# Patient Record
Sex: FEMALE | Race: WHITE | NOT HISPANIC OR LATINO | Employment: FULL TIME | URBAN - METROPOLITAN AREA
[De-identification: names, ages, dates, MRNs, and addresses within clinical notes are randomized per-mention and may not be internally consistent; named-entity substitution may affect disease eponyms.]

---

## 2017-07-26 ENCOUNTER — TRANSFERRED RECORDS (OUTPATIENT)
Dept: HEALTH INFORMATION MANAGEMENT | Facility: CLINIC | Age: 32
End: 2017-07-26

## 2017-08-16 ENCOUNTER — OFFICE VISIT (OUTPATIENT)
Dept: FAMILY MEDICINE | Facility: CLINIC | Age: 32
End: 2017-08-16
Payer: COMMERCIAL

## 2017-08-16 VITALS
HEIGHT: 66 IN | DIASTOLIC BLOOD PRESSURE: 80 MMHG | OXYGEN SATURATION: 99 % | WEIGHT: 158 LBS | BODY MASS INDEX: 25.39 KG/M2 | TEMPERATURE: 97.2 F | HEART RATE: 84 BPM | SYSTOLIC BLOOD PRESSURE: 133 MMHG

## 2017-08-16 DIAGNOSIS — Z00.00 ROUTINE HISTORY AND PHYSICAL EXAMINATION OF ADULT: Primary | ICD-10-CM

## 2017-08-16 DIAGNOSIS — R53.83 FATIGUE, UNSPECIFIED TYPE: ICD-10-CM

## 2017-08-16 DIAGNOSIS — Z12.4 SCREENING FOR MALIGNANT NEOPLASM OF CERVIX: ICD-10-CM

## 2017-08-16 DIAGNOSIS — Z11.3 SCREEN FOR STD (SEXUALLY TRANSMITTED DISEASE): ICD-10-CM

## 2017-08-16 LAB
ALBUMIN SERPL-MCNC: 4 G/DL (ref 3.4–5)
ALP SERPL-CCNC: 58 U/L (ref 40–150)
ALT SERPL W P-5'-P-CCNC: 22 U/L (ref 0–50)
ANION GAP SERPL CALCULATED.3IONS-SCNC: 8 MMOL/L (ref 3–14)
AST SERPL W P-5'-P-CCNC: 9 U/L (ref 0–45)
BILIRUB SERPL-MCNC: 0.6 MG/DL (ref 0.2–1.3)
BUN SERPL-MCNC: 13 MG/DL (ref 7–30)
CALCIUM SERPL-MCNC: 8.9 MG/DL (ref 8.5–10.1)
CHLORIDE SERPL-SCNC: 103 MMOL/L (ref 94–109)
CO2 SERPL-SCNC: 27 MMOL/L (ref 20–32)
CREAT SERPL-MCNC: 0.83 MG/DL (ref 0.52–1.04)
ERYTHROCYTE [DISTWIDTH] IN BLOOD BY AUTOMATED COUNT: 12.6 % (ref 10–15)
GFR SERPL CREATININE-BSD FRML MDRD: 80 ML/MIN/1.7M2
GLUCOSE SERPL-MCNC: 91 MG/DL (ref 70–99)
HCT VFR BLD AUTO: 43.5 % (ref 35–47)
HGB BLD-MCNC: 14.9 G/DL (ref 11.7–15.7)
LDLC SERPL DIRECT ASSAY-MCNC: 67 MG/DL
MCH RBC QN AUTO: 32.5 PG (ref 26.5–33)
MCHC RBC AUTO-ENTMCNC: 34.3 G/DL (ref 31.5–36.5)
MCV RBC AUTO: 95 FL (ref 78–100)
PLATELET # BLD AUTO: 268 10E9/L (ref 150–450)
POTASSIUM SERPL-SCNC: 3.9 MMOL/L (ref 3.4–5.3)
PROT SERPL-MCNC: 7.4 G/DL (ref 6.8–8.8)
RBC # BLD AUTO: 4.59 10E12/L (ref 3.8–5.2)
SODIUM SERPL-SCNC: 138 MMOL/L (ref 133–144)
T PALLIDUM IGG+IGM SER QL: NEGATIVE
TSH SERPL DL<=0.005 MIU/L-ACNC: 1.11 MU/L (ref 0.4–4)
VIT B12 SERPL-MCNC: 373 PG/ML (ref 193–986)
WBC # BLD AUTO: 5.5 10E9/L (ref 4–11)

## 2017-08-16 PROCEDURE — 80053 COMPREHEN METABOLIC PANEL: CPT | Performed by: PREVENTIVE MEDICINE

## 2017-08-16 PROCEDURE — 36415 COLL VENOUS BLD VENIPUNCTURE: CPT | Performed by: PREVENTIVE MEDICINE

## 2017-08-16 PROCEDURE — G0145 SCR C/V CYTO,THINLAYER,RESCR: HCPCS | Performed by: PREVENTIVE MEDICINE

## 2017-08-16 PROCEDURE — 99385 PREV VISIT NEW AGE 18-39: CPT | Performed by: PREVENTIVE MEDICINE

## 2017-08-16 PROCEDURE — 87491 CHLMYD TRACH DNA AMP PROBE: CPT | Performed by: PREVENTIVE MEDICINE

## 2017-08-16 PROCEDURE — 83721 ASSAY OF BLOOD LIPOPROTEIN: CPT | Performed by: PREVENTIVE MEDICINE

## 2017-08-16 PROCEDURE — 87340 HEPATITIS B SURFACE AG IA: CPT | Performed by: PREVENTIVE MEDICINE

## 2017-08-16 PROCEDURE — 82607 VITAMIN B-12: CPT | Performed by: PREVENTIVE MEDICINE

## 2017-08-16 PROCEDURE — 87591 N.GONORRHOEAE DNA AMP PROB: CPT | Performed by: PREVENTIVE MEDICINE

## 2017-08-16 PROCEDURE — 87389 HIV-1 AG W/HIV-1&-2 AB AG IA: CPT | Performed by: PREVENTIVE MEDICINE

## 2017-08-16 PROCEDURE — 82306 VITAMIN D 25 HYDROXY: CPT | Performed by: PREVENTIVE MEDICINE

## 2017-08-16 PROCEDURE — 85027 COMPLETE CBC AUTOMATED: CPT | Performed by: PREVENTIVE MEDICINE

## 2017-08-16 PROCEDURE — 86803 HEPATITIS C AB TEST: CPT | Performed by: PREVENTIVE MEDICINE

## 2017-08-16 PROCEDURE — 84443 ASSAY THYROID STIM HORMONE: CPT | Performed by: PREVENTIVE MEDICINE

## 2017-08-16 PROCEDURE — 99212 OFFICE O/P EST SF 10 MIN: CPT | Mod: 25 | Performed by: PREVENTIVE MEDICINE

## 2017-08-16 PROCEDURE — 87624 HPV HI-RISK TYP POOLED RSLT: CPT | Performed by: PREVENTIVE MEDICINE

## 2017-08-16 PROCEDURE — 86780 TREPONEMA PALLIDUM: CPT | Performed by: PREVENTIVE MEDICINE

## 2017-08-16 NOTE — MR AVS SNAPSHOT
After Visit Summary   8/16/2017    Arpita Zafar    MRN: [id number]           Patient Information     Date Of Birth          1985        Visit Information        Provider Department      8/16/2017 9:00 AM Carmen Beach MD [facility]        Today's Diagnoses     Routine history and physical examination of adult    -  1    Screening for malignant neoplasm of cervix        Fatigue, unspecified type          Care Instructions      Preventive Health Recommendations  Female Ages 26 - 39  Yearly exam:   See your health care provider every year in order to    Review health changes.     Discuss preventive care.      Review your medicines if you your doctor has prescribed any.    Until age 30: Get a Pap test every three years (more often if you have had an abnormal result).    After age 30: Talk to your doctor about whether you should have a Pap test every 3 years or have a Pap test with HPV screening every 5 years.   You do not need a Pap test if your uterus was removed (hysterectomy) and you have not had cancer.  You should be tested each year for STDs (sexually transmitted diseases), if you're at risk.   Talk to your provider about how often to have your cholesterol checked.  If you are at risk for diabetes, you should have a diabetes test (fasting glucose).  Shots: Get a flu shot each year. Get a tetanus shot every 10 years.   Nutrition:     Eat at least 5 servings of fruits and vegetables each day.    Eat whole-grain bread, whole-wheat pasta and brown rice instead of white grains and rice.    Talk to your provider about Calcium and Vitamin D.     Lifestyle    Exercise at least 150 minutes a week (30 minutes a day, 5 days of the week). This will help you control your weight and prevent disease.    Limit alcohol to one drink per day.    No smoking.     Wear sunscreen to prevent skin cancer.    See your dentist every six months for an exam and cleaning.            Follow-ups  "after your visit        Who to contact     If you have questions or need follow up information about today's clinic visit or your schedule please contact [facility] directly at [phone].  Normal or non-critical lab and imaging results will be communicated to you by MyChart, letter or phone within 4 business days after the clinic has received the results. If you do not hear from us within 7 days, please contact the clinic through MyChart or phone. If you have a critical or abnormal lab result, we will notify you by phone as soon as possible.  Submit refill requests through [employer] or call your pharmacy and they will forward the refill request to us. Please allow 3 business days for your refill to be completed.          Additional Information About Your Visit        [email or web address] Information     [employer] lets you send messages to your doctor, view your test results, renew your prescriptions, schedule appointments and more. To sign up, go to [email or web address] . Click on \"Log in\" on the left side of the screen, which will take you to the Welcome page. Then click on \"Sign up Now\" on the right side of the page.     You will be asked to enter the access code listed below, as well as some personal information. Please follow the directions to create your username and password.     Your access code is: [email or web address]  Expires: 2017  9:31 AM     Your access code will  in 90 days. If you need help or a new code, please call your [place] clinic or [phone].        Care EveryWhere ID     This is your Care EveryWhere ID. This could be used by other organizations to access your [place] medical records  [id number]        Your Vitals Were     Pulse Temperature Height Last Period Pulse Oximetry Breastfeeding?    84 97.2  F (36.2  C) (Oral) 5' 6.06\" (1.678 m) 2017 (Within Days) 99% No    BMI (Body Mass Index)                   25.45 kg/m2            Blood Pressure from Last 3 Encounters: "   08/16/17 133/80    Weight from Last 3 Encounters:   08/16/17 158 lb (71.7 kg)              We Performed the Following     CBC with platelets     Comprehensive metabolic panel     HIV Antigen Antibody Combo     HPV High Risk Types DNA Cervical     LDL cholesterol direct     Pap imaged thin layer screen with HPV - recommended age 30 - 65 years (select HPV order below)     TSH with free T4 reflex     Vitamin B12     Vitamin D Deficiency        Primary Care Provider    None Specified       No primary provider on file.        Equal Access to Services     DREA PINO : Hadii kin Sen, wabrunoda cem, qajakubta kaalmada km, mark bah . So [facility] [phone].    ATENCIÓN: Si yadira nicole, tiene a sullivan disposición servicios gratuitos de asistencia lingüística. Llame al [phone].    We comply with applicable federal civil rights laws and [place] laws. We do not discriminate on the basis of race, color, national origin, age, disability sex, sexual orientation or gender identity.            Thank you!     Thank you for choosing [facility]  for your care. Our goal is always to provide you with excellent care. Hearing back from our patients is one way we can continue to improve our services. Please take a few minutes to complete the written survey that you may receive in the mail after your visit with us. Thank you!             Your Updated Medication List - Protect others around you: Learn how to safely use, store and throw away your medicines at www.disposemymeds.org.      Notice  As of 8/16/2017  9:31 AM    You have not been prescribed any medications.

## 2017-08-16 NOTE — Clinical Note
Please abstract the following data from this visit with this patient into the appropriate field in Epic:  Eye exam with ophthalmology on this date: July 26, 2017

## 2017-08-16 NOTE — NURSING NOTE
"Chief Complaint   Patient presents with     Establish Care     Physical       Initial /80 (BP Location: Left arm, Patient Position: Chair, Cuff Size: Adult Small)  Pulse 84  Temp 97.2  F (36.2  C) (Oral)  Ht 5' 6.06\" (1.678 m)  Wt 158 lb (71.7 kg)  LMP 08/02/2017 (Within Days)  SpO2 99%  Breastfeeding? No  BMI 25.45 kg/m2 Estimated body mass index is 25.45 kg/(m^2) as calculated from the following:    Height as of this encounter: 5' 6.06\" (1.678 m).    Weight as of this encounter: 158 lb (71.7 kg).  Medication Reconciliation: complete   Justina Perez CMA      "

## 2017-08-16 NOTE — PROGRESS NOTES
SUBJECTIVE:   CC: Arpita Zafar is an 32 year old woman who presents for preventive health visit.   Patient is here to establish care and for a physical.    Healthy Habits:    Do you get at least three servings of calcium containing foods daily (dairy, green leafy vegetables, etc.)? yes    Amount of exercise or daily activities, outside of work: 3 day(s) per week    Problems taking medications regularly not applicable    Medication side effects: No    Have you had an eye exam in the past two years? Yes    Do you see a dentist twice per year? No- will schedule appointment    Do you have sleep apnea, excessive snoring or daytime drowsiness?yes- daytime drowsiness     Eye exam with ophthalmology on this date: July 26, 2017    Concern: daytime drowsiness- might be due to working at night, would like to discuss what can be done.  Gets tired during the day. Symptoms for 3-4 years. No heavy periods. No melena, no rectal bleeding. No snoring. Sometimes works day and then night shifts. Symptoms even before shift work. No history of rheumatoid arthritis.     Today's PHQ-2 Score: PHQ-2 ( 1999 Pfizer) 8/16/2017   Q1: Little interest or pleasure in doing things 0   Q2: Feeling down, depressed or hopeless 0   PHQ-2 Score 0     Abuse: Current or Past(Physical, Sexual or Emotional)- No  Do you feel safe in your environment - Yes  Social History   Substance Use Topics     Smoking status: Former Smoker     Smokeless tobacco: Never Used     Alcohol use Yes     The patient does not drink >3 drinks per day nor >7 drinks per week.    Reviewed orders with patient.  Reviewed health maintenance and updated orders accordingly - Yes  BP Readings from Last 3 Encounters:   08/16/17 133/80    Wt Readings from Last 3 Encounters:   08/16/17 158 lb (71.7 kg)                  There is no problem list on file for this patient.    History reviewed. No pertinent surgical history.    Social History   Substance Use Topics     Smoking status:  "Former Smoker     Smokeless tobacco: Never Used     Alcohol use Yes     Family History   Problem Relation Age of Onset     DIABETES Father          No current outpatient prescriptions on file.     No Known Allergies        Mammogram not appropriate for this patient based on age.    Pertinent mammograms are reviewed under the imaging tab.  History of abnormal Pap smear: NO - age 30-65 PAP every 5 years with negative HPV co-testing recommended    Reviewed and updated as needed this visit by clinical staffTobacco  Allergies  Meds  Med Hx  Surg Hx  Fam Hx  Soc Hx        Reviewed and updated as needed this visit by Provider        History reviewed. No pertinent past medical history.   History reviewed. No pertinent surgical history.    ROS:  C: NEGATIVE for fever, chills, change in weight  I: NEGATIVE for worrisome rashes, moles or lesions  E: NEGATIVE for vision changes or irritation  ENT: NEGATIVE for ear, mouth and throat problems  R: NEGATIVE for significant cough or SOB  B: NEGATIVE for masses, tenderness or discharge  CV: NEGATIVE for chest pain, palpitations or peripheral edema  GI: NEGATIVE for nausea, abdominal pain, heartburn, or change in bowel habits  : NEGATIVE for unusual urinary or vaginal symptoms. Periods are regular.  M: NEGATIVE for significant arthralgias or myalgia  E: NEGATIVE for temperature intolerance, skin/hair changes  H: NEGATIVE for bleeding problems  P: NEGATIVE for changes in mood or affect    OBJECTIVE:   /80 (BP Location: Left arm, Patient Position: Chair, Cuff Size: Adult Small)  Pulse 84  Temp 97.2  F (36.2  C) (Oral)  Ht 5' 6.06\" (1.678 m)  Wt 158 lb (71.7 kg)  LMP 08/02/2017 (Within Days)  SpO2 99%  Breastfeeding? No  BMI 25.45 kg/m2  EXAM:  GENERAL: healthy, alert and no distress  EYES: Eyes grossly normal to inspection, PERRL and conjunctivae and sclerae normal  HENT: ear canals and TM's normal, nose and mouth without ulcers or lesions  NECK: no adenopathy, no " asymmetry, masses, or scars and thyroid normal to palpation  RESP: lungs clear to auscultation - no rales, rhonchi or wheezes  BREAST: normal without masses, tenderness or nipple discharge and no palpable axillary masses or adenopathy  CV: regular rate and rhythm, normal S1 S2, no S3 or S4, no murmur, click or rub, no peripheral edema and peripheral pulses strong  ABDOMEN: soft, nontender, no hepatosplenomegaly, no masses   (female): normal female external genitalia, normal urethral meatus, vaginal mucosa pink, moist, well rugated, and normal cervix/adnexa/uterus without masses or discharge  MS: no gross musculoskeletal defects noted, no edema  SKIN: no suspicious lesions or rashes  NEURO: Normal strength and tone, mentation intact and speech normal  PSYCH: mentation appears normal, affect normal/bright  LYMPH: no cervical, supraclavicular, axillary,  adenopathy    ASSESSMENT/PLAN:   Arpita was seen today for establish care and physical.    Diagnoses and all orders for this visit:    Routine history and physical examination of adult  -     LDL cholesterol direct  -     HIV Antigen Antibody Combo  -Will check non fasting labs today     Screening for malignant neoplasm of cervix  -     Pap imaged thin layer screen with HPV - recommended age 30 - 65 years (select HPV order below)  -     HPV High Risk Types DNA Cervical  -Screening guidelines reviewed     Fatigue, unspecified type  -     Vitamin D Deficiency  -     Vitamin B12  -     TSH with free T4 reflex  -     CBC with platelets  -     Comprehensive metabolic panel  -If labs are normal and daytime drowsiness persists then will need to be referred to SLEEP    Screen for STD (sexually transmitted disease)  -     Anti Treponema  -     Chlamydia trachomatis PCR  -     Neisseria gonorrhoeae PCR  -     Hepatitis C antibody  -     Hepatitis B surface antigen        COUNSELING:   Reviewed preventive health counseling, as reflected in patient instructions       Regular  "exercise       Healthy diet/nutrition       Safe sex practices/STD prevention       HIV screeninx in teen years, 1x in adult years, and at intervals if high risk    BP Screening:   Last 3 BP Readings:    BP Readings from Last 3 Encounters:   17 133/80       The following was recommended to the patient:  Re-screen BP within a year and recommended lifestyle modifications     reports that she has quit smoking. She has never used smokeless tobacco.    Estimated body mass index is 25.45 kg/(m^2) as calculated from the following:    Height as of this encounter: 5' 6.06\" (1.678 m).    Weight as of this encounter: 158 lb (71.7 kg).   Weight management plan: Discussed healthy diet and exercise guidelines and patient will follow up in 12 months in clinic to re-evaluate.    Counseling Resources:  ATP IV Guidelines  Pooled Cohorts Equation Calculator  Breast Cancer Risk Calculator  FRAX Risk Assessment  ICSI Preventive Guidelines  Dietary Guidelines for Americans, 2010  USDA's MyPlate  ASA Prophylaxis  Lung CA Screening    Carmen Beach MD MPH    [facility]  "

## 2017-08-17 LAB
C TRACH DNA SPEC QL NAA+PROBE: NEGATIVE
N GONORRHOEA DNA SPEC QL NAA+PROBE: NEGATIVE
SPECIMEN SOURCE: NORMAL
SPECIMEN SOURCE: NORMAL

## 2017-08-17 NOTE — PROGRESS NOTES
Arpita,     Tests for Gonorrhea and Chlamydia are negative. Other labs are pending.     Please do not hesitate to call us at [phone] if you have any questions or concerns.    Thank you,    Carmen Beach MD MPH

## 2017-08-17 NOTE — PROGRESS NOTES
Arpita,     Basic blood count did not show anemia or infection in the blood. Electrolytes, glucose, kidney function, liver function, thyroid function, and Vitamin B12 levels are normal.  LDL cholesterol is at goal for you.  Test for Syphilis is negative.  Other labs are pending.    Please do not hesitate to call us at [phone] if you have any questions or concerns.    Thank you,    Carmen Beach MD MPH

## 2017-08-18 LAB
COPATH REPORT: NORMAL
DEPRECATED CALCIDIOL+CALCIFEROL SERPL-MC: 27 UG/L (ref 20–75)
HBV SURFACE AG SERPL QL IA: NONREACTIVE
HCV AB SERPL QL IA: NONREACTIVE
HIV 1+2 AB+HIV1 P24 AG SERPL QL IA: NONREACTIVE
PAP: NORMAL

## 2017-08-21 PROBLEM — R87.810 CERVICAL HIGH RISK HPV (HUMAN PAPILLOMAVIRUS) TEST POSITIVE: Status: ACTIVE | Noted: 2017-08-16

## 2017-08-21 LAB
FINAL DIAGNOSIS: ABNORMAL
HPV HR 12 DNA CVX QL NAA+PROBE: POSITIVE
HPV16 DNA SPEC QL NAA+PROBE: NEGATIVE
HPV18 DNA SPEC QL NAA+PROBE: NEGATIVE
SPECIMEN DESCRIPTION: ABNORMAL

## 2017-08-22 NOTE — PROGRESS NOTES
Arpita,     Tests for HIV, Hepatitis B and C are negative.  Vitamin D levels are in an acceptable range.     Please do not hesitate to call us at [phone] if you have any questions or concerns.    Thank you,    Carmen Beach MD MPH

## 2018-01-16 ENCOUNTER — OFFICE VISIT (OUTPATIENT)
Dept: FAMILY MEDICINE | Facility: CLINIC | Age: 33
End: 2018-01-16
Payer: COMMERCIAL

## 2018-01-16 VITALS
HEART RATE: 84 BPM | OXYGEN SATURATION: 97 % | SYSTOLIC BLOOD PRESSURE: 138 MMHG | DIASTOLIC BLOOD PRESSURE: 82 MMHG | TEMPERATURE: 98.1 F | WEIGHT: 157.3 LBS | BODY MASS INDEX: 25.34 KG/M2

## 2018-01-16 DIAGNOSIS — L30.9 ECZEMA, UNSPECIFIED TYPE: Primary | ICD-10-CM

## 2018-01-16 PROCEDURE — 99213 OFFICE O/P EST LOW 20 MIN: CPT | Performed by: PHYSICIAN ASSISTANT

## 2018-01-16 RX ORDER — TRIAMCINOLONE ACETONIDE 1 MG/G
CREAM TOPICAL
Qty: 45 G | Refills: 1 | Status: SHIPPED | OUTPATIENT
Start: 2018-01-16 | End: 2018-03-23

## 2018-01-16 NOTE — PROGRESS NOTES
SUBJECTIVE:   Arpita Zafar is a 32 year old female who presents to clinic today for the following health issues:  Rash  Onset: 1-2 months ago    Description:   Location: Left leg, spread to lower abdomen, knees and elbows   Character: red  Itching (Pruritis): YES    Progression of Symptoms:  worsening    Accompanying Signs & Symptoms:  Fever: no   Body aches or joint pain: no   Sore throat symptoms: no   Recent cold symptoms: no     History:   Previous similar rash: no     Precipitating factors:   Exposure to similar rash: no   New exposures: None   Recent travel: no   Therapies Tried and outcome: hydrocortisone cream 2.5%- helped only slightly, but only uses once a week and only has tried twice          Problem list and histories reviewed & adjusted, as indicated.  Additional history: as documented    Patient Active Problem List   Diagnosis     Cervical high risk HPV (human papillomavirus) test positive     History reviewed. No pertinent surgical history.    Social History   Substance Use Topics     Smoking status: Former Smoker     Smokeless tobacco: Never Used     Alcohol use Yes     Family History   Problem Relation Age of Onset     DIABETES Father          Current Outpatient Prescriptions   Medication Sig Dispense Refill     triamcinolone (KENALOG) 0.1 % cream Apply sparingly to affected area three times daily as needed 45 g 1     No Known Allergies           ROS:  Constitutional, HEENT, cardiovascular, pulmonary, gi and gu systems are negative, except as otherwise noted.      OBJECTIVE:   /82 (BP Location: Right arm, Patient Position: Chair, Cuff Size: Adult Regular)  Pulse 84  Temp 98.1  F (36.7  C) (Oral)  Wt 157 lb 4.8 oz (71.4 kg)  LMP 01/15/2018 (Exact Date)  SpO2 97%  BMI 25.34 kg/m2  Body mass index is 25.34 kg/(m^2).  GENERAL: healthy, alert and no distress  SKIN: 2-3 erythematous, dry patches of skin on the legs, and one on abdomen excoriation marks are present    Diagnostic Test  Results:  none     ASSESSMENT/PLAN:       ICD-10-CM    1. Eczema, unspecified type L30.9 triamcinolone (KENALOG) 0.1 % cream     Triamcinolone apply three times a day for 2 weeks or less as needed  Follow up in 2 weeks if not better      Jacquie Griffith PA-C  [facility]

## 2018-01-16 NOTE — MR AVS SNAPSHOT
After Visit Summary   1/16/2018    Arpita Zafar    MRN: [id number]           Patient Information     Date Of Birth          1985        Visit Information        Provider Department      1/16/2018 11:00 AM Jacquie Griffith PA-C [facility]        Today's Diagnoses     Need for prophylactic vaccination and inoculation against influenza    -  1    Eczema, unspecified type          Care Instructions    Triamcinolone apply three times a day for 2 weeks or less as needed   Atopic Dermatitis (Adult)  Atopic dermatitis is a dry, itchy, red rash. It s also called eczema. The rash is chronic, or ongoing. It can come and go over time. The disease is often passed down in families. It causes a problem with the skin barrier that makes the skin more sensitive to the environment and other factors. The increased skin sensitivity causes an itch, which causes scratching. Scratching can worsen the itching or also break the skin. This can put the skin at risk of infection.  The condition is most common in people with asthma, hay fever, hives, or dry or sensitive skin. The rash may be caused by extreme heat or heavy sweating. Skin irritants can cause the rash to flare up. These can include wool or silk clothing, grease, oils, some medicines, and harsh soaps and detergents. Emotional stress can also be a trigger.  Treatment is done to relieve the itching and inflammation of the skin.  Home care  Follow these tips to care for your condition:    Keep the areas of rash clean by bathing at least every other day. Use lukewarm water to bathe. Don t use hot water, which can dry out the skin.    Don t use soaps with strong detergents. Use mild soaps made for sensitive skin.    Apply a cream or ointment to damp skin right after bathing.    Avoid things that irritate your skin. Wear absorbent, soft fabrics next to the skin rather than rough or scratchy materials.    Use mild laundry soap free  of scents and perfumes. Make sure to rinse all the soap out of your clothes.    Treat any skin infection as directed.    Use oral diphenhydramine to help reduce itching. This is an antihistamine you can buy at drug and grocery stores. It can make you sleepy, so use lower doses during the daytime. Or you can use loratadine. This is an antihistamine that will not make you sleepy. Do not use diphenhydramine if you have glaucoma or have trouble urinating due to an enlarged prostate.  Follow-up care  See your healthcare provider, or as advised. If your symptoms don t get better or if they get worse in the next 7 days, make an appointment with your healthcare provider.  When to seek medical advice  Call your healthcare provider right away  if any of these occur:    Increasing area of redness or pain in the skin    Yellow crusts or wet drainage from the rash    Fever of 100.4 F (38 C) or higher, or as directed by your healthcare provider  Date Last Reviewed: 9/1/2016 2000-2017 [employer]. [address] [place] [address]. All rights reserved. This information is not intended as a substitute for professional medical care. Always follow your healthcare professional's instructions.                Follow-ups after your visit        Who to contact     If you have questions or need follow up information about today's clinic visit or your schedule please contact [facility] directly at [phone].  Normal or non-critical lab and imaging results will be communicated to you by MyChart, letter or phone within 4 business days after the clinic has received the results. If you do not hear from us within 7 days, please contact the clinic through MyChart or phone. If you have a critical or abnormal lab result, we will notify you by phone as soon as possible.  Submit refill requests through [employer] or call your pharmacy and they will forward the refill request to us. Please allow 3 business  days for your refill to be completed.          Additional Information About Your Visit        MyChart Information     [employer] gives you secure access to your electronic health record. If you see a primary care provider, you can also send messages to your care team and make appointments. If you have questions, please call your primary care clinic.  If you do not have a primary care provider, please call [phone] and they will assist you.        Care EveryWhere ID     This is your Care EveryWhere ID. This could be used by other organizations to access your [place] medical records  [id number]        Your Vitals Were     Pulse Temperature Last Period Pulse Oximetry BMI (Body Mass Index)       84 98.1  F (36.7  C) (Oral) 01/15/2018 (Exact Date) 97% 25.34 kg/m2        Blood Pressure from Last 3 Encounters:   01/16/18 138/82   08/16/17 133/80    Weight from Last 3 Encounters:   01/16/18 157 lb 4.8 oz (71.4 kg)   08/16/17 158 lb (71.7 kg)              Today, you had the following     No orders found for display         Today's Medication Changes          These changes are accurate as of: 1/16/18 11:25 AM.  If you have any questions, ask your nurse or doctor.               Start taking these medicines.        Dose/Directions    triamcinolone 0.1 % cream   Commonly known as:  KENALOG   Used for:  Eczema, unspecified type   Started by:  Jacquie Griffith PA-C        Apply sparingly to affected area three times daily as needed   Quantity:  45 g   Refills:  1            Where to get your medicines      These medications were sent to [facility] [place], [place] - [address]  [address], [facility] [address]     Phone:  [phone]     triamcinolone 0.1 % cream                Primary Care Provider Fax #    Provider Not In System [phone]                Equal Access to Services     DREA PINO AH: David Sen, mirtha priest, maite barbour,  mark muñozmarii jama'aan ah. So [facility] [phone].    ATENCIÓN: Si habla corey, tiene a sullivan disposición servicios gratuitos de asistencia lingüística. Sheyla al [phone].    We comply with applicable federal civil rights laws and [place] laws. We do not discriminate on the basis of race, color, national origin, age, disability, sex, sexual orientation, or gender identity.            Thank you!     Thank you for choosing [facility]  for your care. Our goal is always to provide you with excellent care. Hearing back from our patients is one way we can continue to improve our services. Please take a few minutes to complete the written survey that you may receive in the mail after your visit with us. Thank you!             Your Updated Medication List - Protect others around you: Learn how to safely use, store and throw away your medicines at www.disposemymeds.org.          This list is accurate as of: 1/16/18 11:25 AM.  Always use your most recent med list.                   Brand Name Dispense Instructions for use Diagnosis    triamcinolone 0.1 % cream    KENALOG    45 g    Apply sparingly to affected area three times daily as needed    Eczema, unspecified type

## 2018-01-22 ENCOUNTER — OFFICE VISIT (OUTPATIENT)
Dept: FAMILY MEDICINE | Facility: CLINIC | Age: 33
End: 2018-01-22
Payer: COMMERCIAL

## 2018-01-22 VITALS
TEMPERATURE: 97.4 F | WEIGHT: 156.6 LBS | SYSTOLIC BLOOD PRESSURE: 135 MMHG | DIASTOLIC BLOOD PRESSURE: 80 MMHG | OXYGEN SATURATION: 97 % | BODY MASS INDEX: 26.09 KG/M2 | HEIGHT: 65 IN | HEART RATE: 81 BPM

## 2018-01-22 DIAGNOSIS — L02.818 CUTANEOUS ABSCESS OF OTHER SITE: Primary | ICD-10-CM

## 2018-01-22 PROCEDURE — 99213 OFFICE O/P EST LOW 20 MIN: CPT | Performed by: NURSE PRACTITIONER

## 2018-01-22 RX ORDER — CEPHALEXIN 500 MG/1
500 CAPSULE ORAL 3 TIMES DAILY
Qty: 20 CAPSULE | Refills: 0 | Status: SHIPPED | OUTPATIENT
Start: 2018-01-22 | End: 2018-03-19

## 2018-01-22 NOTE — PROGRESS NOTES
"  SUBJECTIVE:   Arpita Zafar is a 32 year old female who presents to clinic today for the following health issues:      Concern - Ingrowth Hair  Onset: 01/18/18     Description:   Ingrowth hair     Intensity: severe    Progression of Symptoms:  worsening    Accompanying Signs & Symptoms:  Painful, and infected    Previous history of similar problem:   none    Precipitating factors:   Worsened by: none    Alleviating factors:  Improved by: none    Therapies Tried and outcome: none    Was shaving pubic area and had ingrown hair which she then plucked with tweezer. Now she has large, sore, red and warm area on that site.  No fever or chills.    Problem list and histories reviewed & adjusted, as indicated.  Additional history: as documented    Patient Active Problem List   Diagnosis     Cervical high risk HPV (human papillomavirus) test positive     No past surgical history on file.    Social History   Substance Use Topics     Smoking status: Former Smoker     Smokeless tobacco: Never Used     Alcohol use Yes     Family History   Problem Relation Age of Onset     DIABETES Father              Reviewed and updated as needed this visit by clinical staff       Reviewed and updated as needed this visit by Provider         ROS:  Constitutional, HEENT, cardiovascular, pulmonary, gi and gu systems are negative, except as otherwise noted.      OBJECTIVE:   /80 (BP Location: Left arm, Patient Position: Chair, Cuff Size: Adult Regular)  Pulse 81  Temp 97.4  F (36.3  C) (Oral)  Ht 1.66 m (5' 5.35\")  Wt 71 kg (156 lb 9.6 oz)  LMP 01/15/2018 (Exact Date)  SpO2 97%  Breastfeeding? No  BMI 25.78 kg/m2  Body mass index is 25.78 kg/(m^2).  GENERAL: healthy, alert and no distress  MS: no gross musculoskeletal defects noted, no edema  SKIN: red, firm, warm, tender to palpation 2 cm in diameter located on lower pelvis, no fluctance and minimal surrounding erythema.  PSYCH: mentation appears normal, affect " normal/bright    Diagnostic Test Results:  none     ASSESSMENT/PLAN:     1. Cutaneous abscess of other site  No ready for I and D.  Appears early cellulitic.  Treatment as below.  - cephALEXin (KEFLEX) 500 MG capsule; Take 1 capsule (500 mg) by mouth 3 times daily  Dispense: 20 capsule; Refill: 0    Patient Instructions     Warm compresses at least twice a day for the next 4-5 days.  Take the Keflex three times a day with food for 10 days.  Follow up if not improving or if worsening.      At [facility], we strive to deliver an exceptional experience to you, every time we see you.  If you receive a survey in the mail, please send us back your thoughts. We really do value your feedback.    Based on your medical history, these are the current health maintenance/preventive care services that you are due for (some may have been done at this visit.)  Health Maintenance Due   Topic Date Due     INFLUENZA VACCINE (SYSTEM ASSIGNED)  09/01/2017         Suggested websites for health information:  Www.[employer].SMART : Up to date and easily searchable information on multiple topics.  Www.medlineplus.gov : medication info, interactive tutorials, watch real surgeries online  Www.familydoctor.org : good info from the Academy of Family Physicians  Www.cdc.gov : public health info, travel advisories, epidemics (H1N1)  Www.aap.org : children's health info, normal development, vaccinations  Www.health[place]state[place] : [place] dept of health, public health issues in [place], N1N1    Your care team:                            Family Medicine Internal Medicine   MD Jack Muñoz MD Shantel Branch-Fleming, MD Katya Georgiev PA-C Nam Ho, MD Pediatrics   STACY Dewey, KOLBY Kessler APRN MD Deonna Nolasco MD Deborah Mielke, MD Kim Thein, APRN CNP      Clinic hours: Monday - Thursday 7 am-7 pm; Fridays 7 am-5 pm.   Urgent care: Monday - Friday 11 am-9 pm;  Saturday and Sunday 9 am-5 pm.  Pharmacy : Monday -Thursday 8 am-8 pm; Friday 8 am-6 pm; Saturday and Sunday 9 am-5 pm.     Clinic: [phone]   Pharmacy: [phone]        SALAS Singletary [facility]

## 2018-01-22 NOTE — MR AVS SNAPSHOT
After Visit Summary   1/22/2018    Arpita Zafar    MRN: [id number]           Patient Information     Date Of Birth          1985        Visit Information        Provider Department      1/22/2018 3:00 PM Sanjuana Kendall APRN CNP [facility]        Today's Diagnoses     Cutaneous abscess of other site    -  1      Care Instructions    Warm compresses at least twice a day for the next 4-5 days.  Take the Keflex three times a day with food for 10 days.  Follow up if not improving or if worsening.      At [facility], we strive to deliver an exceptional experience to you, every time we see you.  If you receive a survey in the mail, please send us back your thoughts. We really do value your feedback.    Based on your medical history, these are the current health maintenance/preventive care services that you are due for (some may have been done at this visit.)  Health Maintenance Due   Topic Date Due     INFLUENZA VACCINE (SYSTEM ASSIGNED)  09/01/2017         Suggested websites for health information:  Www.[employer].org : Up to date and easily searchable information on multiple topics.  Www.medlineplus.gov : medication info, interactive tutorials, watch real surgeries online  Www.familydoctor.org : good info from the Academy of Family Physicians  Www.cdc.gov : public health info, travel advisories, epidemics (H1N1)  Www.aap.org : children's health info, normal development, vaccinations  Www.health.state[place] : [place] dept of health, public health issues in [place], N1N1    Your care team:                            Family Medicine Internal Medicine   MD Jack Muñoz MD Shantel Branch-Fleming, MD Katya Georgiev PA-C Nam Ho, MD Pediatrics   STACY Dewey CNP Amelia Massimini APRN CNP Shaista Malik, MD Bethany Templen, MD Deborah Mielke, MD Kim Thein, APRN CNP      Clinic hours: Monday - Thursday 7 am-7 pm;  "Fridays 7 am-5 pm.   Urgent care: Monday - Friday 11 am-9 pm; Saturday and Sunday 9 am-5 pm.  Pharmacy : Monday -Thursday 8 am-8 pm; Friday 8 am-6 pm; Saturday and Sunday 9 am-5 pm.     Clinic: [phone]   Pharmacy: [phone]            Follow-ups after your visit        Who to contact     If you have questions or need follow up information about today's clinic visit or your schedule please contact [facility] directly at [phone].  Normal or non-critical lab and imaging results will be communicated to you by MyChart, letter or phone within 4 business days after the clinic has received the results. If you do not hear from us within 7 days, please contact the clinic through [employer] or phone. If you have a critical or abnormal lab result, we will notify you by phone as soon as possible.  Submit refill requests through [employer] or call your pharmacy and they will forward the refill request to us. Please allow 3 business days for your refill to be completed.          Additional Information About Your Visit        [employer] Information     [employer] gives you secure access to your electronic health record. If you see a primary care provider, you can also send messages to your care team and make appointments. If you have questions, please call your primary care clinic.  If you do not have a primary care provider, please call [phone] and they will assist you.        Care EveryWhere ID     This is your Care EveryWhere ID. This could be used by other organizations to access your [facility] medical records  [id number]        Your Vitals Were     Pulse Temperature Height Last Period Pulse Oximetry Breastfeeding?    81 97.4  F (36.3  C) (Oral) 1.66 m (5' 5.35\") 01/15/2018 (Exact Date) 97% No    BMI (Body Mass Index)                   25.78 kg/m2            Blood Pressure from Last 3 Encounters:   01/22/18 135/80   01/16/18 138/82   08/16/17 133/80    Weight from Last 3 Encounters:   01/22/18 71 " kg (156 lb 9.6 oz)   01/16/18 71.4 kg (157 lb 4.8 oz)   08/16/17 71.7 kg (158 lb)              Today, you had the following     No orders found for display         Today's Medication Changes          These changes are accurate as of: 1/22/18  3:29 PM.  If you have any questions, ask your nurse or doctor.               Start taking these medicines.        Dose/Directions    cephALEXin 500 MG capsule   Commonly known as:  KEFLEX   Used for:  Cutaneous abscess of other site   Started by:  Sanjuana Kendall APRN CNP        Dose:  500 mg   Take 1 capsule (500 mg) by mouth 3 times daily   Quantity:  20 capsule   Refills:  0            Where to get your medicines      These medications were [place] [facility] [place] - [address]  [address], [place] [address]     Phone:  [phone]     cephALEXin 500 MG capsule                Primary Care Provider Fax #    Provider Not In System [phone]                Equal Access to Services     [facility]: Hadii aad ku hadasho Soomaali, waaxda luqadaha, qaybta kaalmada adeegyada, waxay idiin hayaan wandaeg khvijaya bah . So [facility] [phone].    ATENCIÓN: Si habla español, tiene a sullivan disposición servicios gratuitos de asistencia lingüística. Llame al [phone].    We comply with applicable federal civil rights laws and [place] laws. We do not discriminate on the basis of race, color, national origin, age, disability, sex, sexual orientation, or gender identity.            Thank you!     Thank you for choosing [facility]  for your care. Our goal is always to provide you with excellent care. Hearing back from our patients is one way we can continue to improve our services. Please take a few minutes to complete the written survey that you may receive in the mail after your visit with us. Thank you!             Your Updated Medication List - Protect others around you: Learn how to safely use,  store and throw away your medicines at www.disposemymeds.org.          This list is accurate as of: 1/22/18  3:29 PM.  Always use your most recent med list.                   Brand Name Dispense Instructions for use Diagnosis    cephALEXin 500 MG capsule    KEFLEX    20 capsule    Take 1 capsule (500 mg) by mouth 3 times daily    Cutaneous abscess of other site       triamcinolone 0.1 % cream    KENALOG    45 g    Apply sparingly to affected area three times daily as needed    Eczema, unspecified type

## 2018-01-22 NOTE — PATIENT INSTRUCTIONS
Warm compresses at least twice a day for the next 4-5 days.  Take the Keflex three times a day with food for 10 days.  Follow up if not improving or if worsening.      At [facility], we strive to deliver an exceptional experience to you, every time we see you.  If you receive a survey in the mail, please send us back your thoughts. We really do value your feedback.    Based on your medical history, these are the current health maintenance/preventive care services that you are due for (some may have been done at this visit.)  Health Maintenance Due   Topic Date Due     INFLUENZA VACCINE (SYSTEM ASSIGNED)  09/01/2017         Suggested websites for health information:  Www.[employer]ty[employer] : Up to date and easily searchable information on multiple topics.  Www.medlineplus.gov : medication info, interactive tutorials, watch real surgeries online  Www.familydoctor.org : good info from the Academy of Family Physicians  Www.cdc.gov : public health info, travel advisories, epidemics (H1N1)  Www.aap.org : children's health info, normal development, vaccinations  Www.health.[facility].[place] : [place] dept of health, public health issues in [place], N1N1    Your care team:                            Family Medicine Internal Medicine   MD Jack Muñoz MD Shantel Branch-Fleming, MD Katya Georgiev PA-C Nam Ho, MD Pediatrics   STACY Dewey, MD Deonna Araujo CNP, MD Deborah Mielke, MD Kim Thein, APRN [facility]      Clinic hours: Monday - Thursday 7 am-7 pm; Fridays 7 am-5 pm.   Urgent care: Monday - Friday 11 am-9 pm; Saturday and Sunday 9 am-5 pm.  Pharmacy : Monday -Thursday 8 am-8 pm; Friday 8 am-6 pm; Saturday and Sunday 9 am-5 pm.     Clinic: [phone]   Pharmacy: [phone]

## 2018-03-19 ENCOUNTER — OFFICE VISIT (OUTPATIENT)
Dept: FAMILY MEDICINE | Facility: CLINIC | Age: 33
End: 2018-03-19
Payer: COMMERCIAL

## 2018-03-19 VITALS
OXYGEN SATURATION: 100 % | WEIGHT: 155 LBS | BODY MASS INDEX: 25.51 KG/M2 | TEMPERATURE: 97.3 F | SYSTOLIC BLOOD PRESSURE: 138 MMHG | HEART RATE: 87 BPM | DIASTOLIC BLOOD PRESSURE: 87 MMHG

## 2018-03-19 DIAGNOSIS — L73.9 FOLLICULITIS: Primary | ICD-10-CM

## 2018-03-19 PROCEDURE — 99213 OFFICE O/P EST LOW 20 MIN: CPT | Performed by: NURSE PRACTITIONER

## 2018-03-19 ASSESSMENT — PAIN SCALES - GENERAL: PAINLEVEL: MILD PAIN (2)

## 2018-03-19 NOTE — PROGRESS NOTES
SUBJECTIVE:   Arpita Zafar is a 33 year old female who presents to clinic today for the following health issues:    Rash  Onset:x 2-3 days: pt states she think she has razor burn    Description:   Location: left axillary   Character: round, raised, red  Itching (Pruritis): no, pt states it is painful     Progression of Symptoms:  worsening    Accompanying Signs & Symptoms:    Pt denies any drainage   Fever: no   Body aches or joint pain: no   Sore throat symptoms: no   Recent cold symptoms: no     History:   Previous similar rash: YES in pelvic area last month requiring keflex due to cellulitis    Precipitating factors:   Exposure to similar rash: no   New exposures: None     Alleviating factors:  none    Therapies Tried and outcome: none  Of note, boyfriend's uncle was just diagnosed with cancer stage 4 so she is worried about that. Denies fatigue, night sweats, change in weight.  No family history of breast cancer.  No personal history of cancer.    Problem list and histories reviewed & adjusted, as indicated.  Additional history: as documented    Patient Active Problem List   Diagnosis     Cervical high risk HPV (human papillomavirus) test positive     No past surgical history on file.    Social History   Substance Use Topics     Smoking status: Former Smoker     Smokeless tobacco: Never Used     Alcohol use Yes     Family History   Problem Relation Age of Onset     DIABETES Father          Current Outpatient Prescriptions   Medication Sig Dispense Refill     triamcinolone (KENALOG) 0.1 % cream Apply sparingly to affected area three times daily as needed 45 g 1     No Known Allergies  Recent Labs   Lab Test  08/16/17   0953   LDL  67   ALT  22   CR  0.83   GFRESTIMATED  80   GFRESTBLACK  >90   POTASSIUM  3.9   TSH  1.11      BP Readings from Last 3 Encounters:   03/19/18 138/87   01/22/18 135/80   01/16/18 138/82    Wt Readings from Last 3 Encounters:   03/19/18 155 lb (70.3 kg)   01/22/18 156 lb 9.6 oz  (71 kg)   01/16/18 157 lb 4.8 oz (71.4 kg)                    Reviewed and updated as needed this visit by clinical staff  Tobacco  Allergies       Reviewed and updated as needed this visit by Provider         ROS:  Constitutional, HEENT, cardiovascular, pulmonary, gi and gu systems are negative, except as otherwise noted.    OBJECTIVE:     /87 (BP Location: Left arm, Patient Position: Sitting, Cuff Size: Adult Regular)  Pulse 87  Temp 97.3  F (36.3  C) (Oral)  Wt 155 lb (70.3 kg)  SpO2 100%  BMI 25.51 kg/m2  Body mass index is 25.51 kg/(m^2).  GENERAL: healthy, alert and no distress  NECK: no adenopathy, no asymmetry, masses, or scars and thyroid normal to palpation  RESP: lungs clear to auscultation - no rales, rhonchi or wheezes  BREAST: normal without masses, tenderness or nipple discharge and no palpable axillary masses or adenopathy  CV: regular rate and rhythm, normal S1 S2, no S3 or S4, no murmur, click or rub, no peripheral edema and peripheral pulses strong  ABDOMEN: soft, nontender, no hepatosplenomegaly, no masses and bowel sounds normal  MS: no gross musculoskeletal defects noted, no edema  SKIN: left axilla:  3 red swollen hair follicles without central head or surrounding erythema.  Mild tenderness      ASSESSMENT/PLAN:     1. Folliculitis  Left axilla, not cellulitic.  Advised warm compresses.  Reassured that it is not cancer.  Likely related to shaving routine.  Follow up if worsening or not improving.      FUTURE APPOINTMENTS:       - Follow-up visit in 1-2 weeks if needed.    SALAS Singletary [facility]

## 2018-03-19 NOTE — PATIENT INSTRUCTIONS
At [facility], we strive to deliver an exceptional experience to you, every time we see you.  If you receive a survey in the mail, please send us back your thoughts. We really do value your feedback.    Suggested websites for health information:  Www.[employer].org : Up to date and easily searchable information on multiple topics.  Www.medlineplus.gov : medication info, interactive tutorials, watch real surgeries online  Www.familydoctor.org : good info from the Academy of Family Physicians  Www.cdc.gov : public health info, travel advisories, epidemics (H1N1)  Www.aap.org : children's health info, normal development, vaccinations  Www.health.[facility].[place].us : [place] dept of health, public health issues in [place], N1N1    Your care team:                            Family Medicine Internal Medicine   MD Jack Muñoz MD Shantel Branch-Fleming, MD Katya Georgiev PA-C Megan Hill, APRN CNP    Perez Ho MD Pediatrics   Josue Cain, PAHARRIS Kendall, CNP Arlin Kessler APRN CNP   MD Deonna Simmons MD Deborah Mielke, MD Kim Thein, APRN CNP      Clinic hours: Monday - Thursday 7 am-7 pm; Fridays 7 am-5 pm.   Urgent care: Monday - Friday 11 am-9 pm; Saturday and Sunday 9 am-5 pm.  Pharmacy : Monday -Thursday 8 am-8 pm; Friday 8 am-6 pm; Saturday and Sunday 9 am-5 pm.     Clinic: [phone]   Pharmacy: [phone]

## 2018-03-19 NOTE — MR AVS SNAPSHOT
After Visit Summary   3/19/2018    Arpita Zafar    MRN: [id number]           Patient Information     Date Of Birth          1985        Visit Information        Provider Department      3/19/2018 10:20 AM Sanjuana Kendall APRN CNP [facility]        Today's Diagnoses     Folliculitis    -  1      Care Instructions    At [facility], we strive to deliver an exceptional experience to you, every time we see you.  If you receive a survey in the mail, please send us back your thoughts. We really do value your feedback.    Suggested websites for health information:  Www.[employer].iB[employer]2Save : Up to date and easily searchable information on multiple topics.  Www.medlineplus.gov : medication info, interactive tutorials, watch real surgeries online  Www.familydoctor.org : good info from the Academy of Family Physicians  Www.cdc.gov : public health info, travel advisories, epidemics (H1N1)  Www.aap.org : children's health info, normal development, vaccinations  Www.health.[facility].[place].us : [place] dept of health, public health issues in [place], N1N1    Your care team:                            Family Medicine Internal Medicine   MD Jack Muñoz MD Shantel Branch-Fleming, MD Katya Georgiev PA-C Megan Hill, APRN CNP Nam Ho, MD Pediatrics   STACY Dewey, MD Deonna Araujo CNP, MD Deborah Mielke, MD Kim Thein, APRN [facility]      Clinic hours: Monday - Thursday 7 am-7 pm; Fridays 7 am-5 pm.   Urgent care: Monday - Friday 11 am-9 pm; Saturday and Sunday 9 am-5 pm.  Pharmacy : Monday -Thursday 8 am-8 pm; Friday 8 am-6 pm; Saturday and Sunday 9 am-5 pm.     Clinic: [phone]   Pharmacy: [phone]                Follow-ups after your visit        Follow-up notes from your care team     Return in about 1 week (around 3/26/2018), or if symptoms worsen or fail to improve.      Who to  contact     If you have questions or need follow up information about today's clinic visit or your schedule please contact [facility] directly at [phone].  Normal or non-critical lab and imaging results will be communicated to you by MyChart, letter or phone within 4 business days after the clinic has received the results. If you do not hear from us within 7 days, please contact the clinic through [employer] or phone. If you have a critical or abnormal lab result, we will notify you by phone as soon as possible.  Submit refill requests through [employer] or call your pharmacy and they will forward the refill request to us. Please allow 3 business days for your refill to be completed.          Additional Information About Your Visit        [employer] Information     [employer] gives you secure access to your electronic health record. If you see a primary care provider, you can also send messages to your care team and make appointments. If you have questions, please call your primary care clinic.  If you do not have a primary care provider, please call [phone] and they will assist you.        Care EveryWhere ID     This is your Care EveryWhere ID. This could be used by other organizations to access your [facility] medical records  [id number]        Your Vitals Were     Pulse Temperature Pulse Oximetry BMI (Body Mass Index)          87 97.3  F (36.3  C) (Oral) 100% 25.51 kg/m2         Blood Pressure from Last 3 Encounters:   03/19/18 138/87   01/22/18 135/80   01/16/18 138/82    Weight from Last 3 Encounters:   03/19/18 155 lb (70.3 kg)   01/22/18 156 lb 9.6 oz (71 kg)   01/16/18 157 lb 4.8 oz (71.4 kg)              Today, you had the following     No orders found for display       Primary Care Provider Office Phone # Fax #    [facility] [phone]       [address]  [facility] [address]        Equal Access to Services     DREA PINO AH: David angulo  Mckinley, mirtha luaugustinaadaha, maite kaconstantin barbour, mark mora bienvenidovijaya lacaitlynperez kmy. So [facility] [phone].    ATENCIÓN: Si yadira nicole, tiene a sullivan disposición servicios gratuitos de asistencia lingüística. Sheyla al [phone].    We comply with applicable federal civil rights laws and [place] laws. We do not discriminate on the basis of race, color, national origin, age, disability, sex, sexual orientation, or gender identity.            Thank you!     Thank you for choosing [facility]  for your care. Our goal is always to provide you with excellent care. Hearing back from our patients is one way we can continue to improve our services. Please take a few minutes to complete the written survey that you may receive in the mail after your visit with us. Thank you!             Your Updated Medication List - Protect others around you: Learn how to safely use, store and throw away your medicines at www.disposemymeds.org.          This list is accurate as of 3/19/18 11:49 AM.  Always use your most recent med list.                   Brand Name Dispense Instructions for use Diagnosis    triamcinolone 0.1 % cream    KENALOG    45 g    Apply sparingly to affected area three times daily as needed    Eczema, unspecified type

## 2018-03-22 ENCOUNTER — MYC MEDICAL ADVICE (OUTPATIENT)
Dept: FAMILY MEDICINE | Facility: CLINIC | Age: 33
End: 2018-03-22

## 2018-03-23 ENCOUNTER — OFFICE VISIT (OUTPATIENT)
Dept: FAMILY MEDICINE | Facility: CLINIC | Age: 33
End: 2018-03-23
Payer: COMMERCIAL

## 2018-03-23 VITALS
TEMPERATURE: 97.7 F | WEIGHT: 155 LBS | BODY MASS INDEX: 25.83 KG/M2 | HEIGHT: 65 IN | OXYGEN SATURATION: 97 % | DIASTOLIC BLOOD PRESSURE: 82 MMHG | SYSTOLIC BLOOD PRESSURE: 124 MMHG | HEART RATE: 77 BPM

## 2018-03-23 DIAGNOSIS — L73.9 FOLLICULITIS: Primary | ICD-10-CM

## 2018-03-23 PROCEDURE — 99213 OFFICE O/P EST LOW 20 MIN: CPT | Performed by: PREVENTIVE MEDICINE

## 2018-03-23 RX ORDER — MUPIROCIN 20 MG/G
OINTMENT TOPICAL 3 TIMES DAILY
Qty: 22 G | Refills: 1 | Status: SHIPPED | OUTPATIENT
Start: 2018-03-23 | End: 2018-03-28

## 2018-03-23 RX ORDER — SULFAMETHOXAZOLE/TRIMETHOPRIM 800-160 MG
1 TABLET ORAL 2 TIMES DAILY
Qty: 14 TABLET | Refills: 0 | Status: SHIPPED | OUTPATIENT
Start: 2018-03-23 | End: 2018-04-17

## 2018-03-23 ASSESSMENT — PAIN SCALES - GENERAL: PAINLEVEL: NO PAIN (0)

## 2018-03-23 NOTE — PROGRESS NOTES
SUBJECTIVE:   Arpita Zafar is a 33 year old female who presents to clinic today for the following health issues:      Ingrown hairs      Duration: x 5 days    Description (location/character/radiation): both arm pits    Intensity:  severe    Accompanying signs and symptoms: none    History (similar episodes/previous evaluation): None    Precipitating or alleviating factors: None    Therapies tried and outcome: warm packs   No fever or chills  No changes in soaps or deodorants  No drainage  Started like a razor burn  Has been trying not to shave  No history of diabetes     Problem list and histories reviewed & adjusted, as indicated.  Additional history: as documented    Patient Active Problem List   Diagnosis     Cervical high risk HPV (human papillomavirus) test positive     History reviewed. No pertinent surgical history.    Social History   Substance Use Topics     Smoking status: Former Smoker     Smokeless tobacco: Never Used     Alcohol use Yes     Family History   Problem Relation Age of Onset     DIABETES Father          Current Outpatient Prescriptions   Medication Sig Dispense Refill     sulfamethoxazole-trimethoprim (BACTRIM DS/SEPTRA DS) 800-160 MG per tablet Take 1 tablet by mouth 2 times daily 14 tablet 0     mupirocin (BACTROBAN) 2 % ointment Apply topically 3 times daily for 5 days 22 g 1     No Known Allergies  BP Readings from Last 3 Encounters:   03/23/18 124/82   03/19/18 138/87   01/22/18 135/80    Wt Readings from Last 3 Encounters:   03/23/18 155 lb (70.3 kg)   03/19/18 155 lb (70.3 kg)   01/22/18 156 lb 9.6 oz (71 kg)                  Labs reviewed in EPIC    Reviewed and updated as needed this visit by clinical staff  Allergies  Meds       Reviewed and updated as needed this visit by Provider         ROS:  Constitutional, HEENT, cardiovascular, pulmonary, gi and gu systems are negative, except as otherwise noted.    OBJECTIVE:                                                    BP  "124/82  Pulse 77  Temp 97.7  F (36.5  C) (Oral)  Ht 5' 5\" (1.651 m)  Wt 155 lb (70.3 kg)  LMP 02/16/2018 (Approximate)  SpO2 97%  Breastfeeding? No  BMI 25.79 kg/m2  Body mass index is 25.79 kg/(m^2).  GENERAL APPEARANCE: healthy, alert and no distress  EYES: Eyes grossly normal to inspection and conjunctivae and sclerae normal  NECK: trachea midline and normal to palpation  RESP: lungs clear to auscultation - no rales, rhonchi or wheezes  CV: regular rates and rhythm, normal S1 S2, no S3 or S4 and no murmur, click or rub  ABDOMEN: soft, non-tender  MS: extremities normal- no gross deformities noted  SKIN: Left more than right Axillae: pustules noted, on the left side there is an erythematous nodule, coming to a head. No induration or fluctuance.   NEURO: Normal strength and tone, mentation intact and speech normal  PSYCH: mentation appears normal and affect normal/bright    Diagnostic test results:  Diagnostic Test Results:  No results found for this or any previous visit (from the past 24 hour(s)).     ASSESSMENT/PLAN:                                                    1. Folliculitis  -Warm compresses  -tylenol as needed  - sulfamethoxazole-trimethoprim (BACTRIM DS/SEPTRA DS) 800-160 MG per tablet; Take 1 tablet by mouth 2 times daily  Dispense: 14 tablet; Refill: 0  - mupirocin (BACTROBAN) 2 % ointment; Apply topically 3 times daily for 5 days  Dispense: 22 g; Refill: 1      Follow up with Provider - If not better in 5-7 days then may need Dermatology referral      Carmen Beach MD MPH    [facility]  "

## 2018-03-23 NOTE — MR AVS SNAPSHOT
After Visit Summary   3/23/2018    Arpita Zafar    MRN: [id number]           Patient Information     Date Of Birth          1985        Visit Information        Provider Department      3/23/2018 3:20 PM Carmen Beach MD [facility]        Today's Diagnoses     Folliculitis    -  1      Care Instructions    At [facility], we strive to deliver an exceptional experience to you, every time we see you.  If you receive a survey in the mail, please send us back your thoughts. We really do value your feedback.    Based on your medical history, these are the current health maintenance/preventive care services that you are due for (some may have been done at this visit.)  There are no preventive care reminders to display for this patient.      Suggested websites for health information:  Www.AppHero.Diagnoplex : Up to date and easily searchable information on multiple topics.  Www.Pocket Tales.gov : medication info, interactive tutorials, watch real surgeries online  Www.familydoctor.org : good info from the Academy of Family Physicians  Www.cdc.gov : public health info, travel advisories, epidemics (H1N1)  Www.aap.org : children's health info, normal development, vaccinations  Www.health.[facility].[place].us : [place] dept of health, public health issues in [place], N1N1    Your care team:                            Family Medicine Internal Medicine   MD Jack Muñoz MD Shantel Branch-Fleming, MD Katya Georgiev PA-C Megan Hill, APRN KOLBY Ho MD Pediatrics   Josue Cain, PAJonnieC  Sanjuana Kendall, KOLBY Kessler APRN CNP   MD Deonna Simmons MD Deborah Mielke, MD Kim Thein, APRN [facility]      Clinic hours: Monday - Thursday 7 am-7 pm; Fridays 7 am-5 pm.   Urgent care: Monday - Friday 11 am-9 pm; Saturday and Sunday 9 am-5 pm.  Pharmacy : Monday -Thursday 8 am-8 pm; Friday 8 am-6 pm; Saturday and Sunday 9 am-5 pm.     Clinic: [phone]   " Pharmacy: [phone]                Follow-ups after your visit        Follow-up notes from your care team     Return if symptoms worsen or fail to improve.      Who to contact     If you have questions or need follow up information about today's clinic visit or your schedule please contact [facility] directly at [phone].  Normal or non-critical lab and imaging results will be communicated to you by MyChart, letter or phone within 4 business days after the clinic has received the results. If you do not hear from us within 7 days, please contact the clinic through [employer] or phone. If you have a critical or abnormal lab result, we will notify you by phone as soon as possible.  Submit refill requests through [employer] or call your pharmacy and they will forward the refill request to us. Please allow 3 business days for your refill to be completed.          Additional Information About Your Visit        [employer] Information     [employer] gives you secure access to your electronic health record. If you see a primary care provider, you can also send messages to your care team and make appointments. If you have questions, please call your primary care clinic.  If you do not have a primary care provider, please call [phone] and they will assist you.        Care EveryWhere ID     This is your Care EveryWhere ID. This could be used by other organizations to access your [place] medical records  [id number]        Your Vitals Were     Pulse Temperature Height Last Period Pulse Oximetry Breastfeeding?    77 97.7  F (36.5  C) (Oral) 5' 5\" (1.651 m) 02/16/2018 (Approximate) 97% No    BMI (Body Mass Index)                   25.79 kg/m2            Blood Pressure from Last 3 Encounters:   03/23/18 124/82   03/19/18 138/87   01/22/18 135/80    Weight from Last 3 Encounters:   03/23/18 155 lb (70.3 kg)   03/19/18 155 lb (70.3 kg)   01/22/18 156 lb 9.6 oz (71 kg)              Today, you had the following "     No orders found for display         Today's Medication Changes          These changes are accurate as of 3/23/18  3:50 PM.  If you have any questions, ask your nurse or doctor.               Start taking these medicines.        Dose/Directions    mupirocin 2 % ointment   Commonly known as:  BACTROBAN   Used for:  Folliculitis   Started by:  Carmen Beach MD        Apply topically 3 times daily for 5 days   Quantity:  22 g   Refills:  1       sulfamethoxazole-trimethoprim 800-160 MG per tablet   Commonly known as:  BACTRIM DS/SEPTRA DS   Used for:  Folliculitis   Started by:  Carmen Beach MD        Dose:  1 tablet   Take 1 tablet by mouth 2 times daily   Quantity:  14 tablet   Refills:  0         Stop taking these medicines if you haven't already. Please contact your care team if you have questions.     triamcinolone 0.1 % cream   Commonly known as:  KENALOG   Stopped by:  Carmen Beach MD                Where to get your medicines      These medications were sent to [facility] - [place], [place] - [address]  [address], [facility] [address]     Phone:  [phone]     mupirocin 2 % ointment    sulfamethoxazole-trimethoprim 800-160 MG per tablet                Primary Care Provider Office Phone # Fax #    [facility] [phone]       [address]  [facility] [address]        Equal Access to Services     [facility]: Hadii aad ku hadasho Soomaali, waaxda luqadaha, qaybta kaalmada adeegyada, mark stewart hayshaye bah . So [facility] [phone].    ATENCIÓN: Si habla español, tiene a sullivan disposición servicios gratuitos de asistencia lingüística. Llame al [phone].    We comply with applicable federal civil rights laws and [place] laws. We do not discriminate on the basis of race, color, national origin, age, disability, sex, sexual orientation, or gender identity.            Thank you!     Thank you for choosing  [facility]  for your care. Our goal is always to provide you with excellent care. Hearing back from our patients is one way we can continue to improve our services. Please take a few minutes to complete the written survey that you may receive in the mail after your visit with us. Thank you!             Your Updated Medication List - Protect others around you: Learn how to safely use, store and throw away your medicines at www.disposemymeds.org.          This list is accurate as of 3/23/18  3:50 PM.  Always use your most recent med list.                   Brand Name Dispense Instructions for use Diagnosis    mupirocin 2 % ointment    BACTROBAN    22 g    Apply topically 3 times daily for 5 days    Folliculitis       sulfamethoxazole-trimethoprim 800-160 MG per tablet    BACTRIM DS/SEPTRA DS    14 tablet    Take 1 tablet by mouth 2 times daily    Folliculitis

## 2018-03-23 NOTE — PATIENT INSTRUCTIONS
At [facility], we strive to deliver an exceptional experience to you, every time we see you.  If you receive a survey in the mail, please send us back your thoughts. We really do value your feedback.    Based on your medical history, these are the current health maintenance/preventive care services that you are due for (some may have been done at this visit.)  There are no preventive care reminders to display for this patient.      Suggested websites for health information:  Www.Coral.org : Up to date and easily searchable information on multiple topics.  Www.medlineplus.gov : medication info, interactive tutorials, watch real surgeries online  Www.familydoctor.org : good info from the Academy of Family Physicians  Www.cdc.gov : public health info, travel advisories, epidemics (H1N1)  Www.aap.org : children's health info, normal development, vaccinations  Www.health.[facility].[place].us : [place] dept of health, public health issues in [place], N1N1    Your care team:                            Family Medicine Internal Medicine   MD Jack Muñoz MD Shantel Branch-Fleming, MD Katya Georgiev PA-C Megan Hill, APRN CNP    Perez Ho MD Pediatrics   Josue Cain, PAHARRIS Kendall, CNP Arlin Kessler APRN CNP   MD Deonna Simmons MD Deborah Mielke, MD Kim Thein, APRN CNP      Clinic hours: Monday - Thursday 7 am-7 pm; Fridays 7 am-5 pm.   Urgent care: Monday - Friday 11 am-9 pm; Saturday and Sunday 9 am-5 pm.  Pharmacy : Monday -Thursday 8 am-8 pm; Friday 8 am-6 pm; Saturday and Sunday 9 am-5 pm.     Clinic: [phone]   Pharmacy: [phone]

## 2018-04-17 ENCOUNTER — OFFICE VISIT (OUTPATIENT)
Dept: FAMILY MEDICINE | Facility: CLINIC | Age: 33
End: 2018-04-17
Payer: COMMERCIAL

## 2018-04-17 VITALS
HEART RATE: 81 BPM | TEMPERATURE: 98.3 F | OXYGEN SATURATION: 97 % | DIASTOLIC BLOOD PRESSURE: 87 MMHG | SYSTOLIC BLOOD PRESSURE: 127 MMHG

## 2018-04-17 DIAGNOSIS — N63.22 MASS OF UPPER INNER QUADRANT OF LEFT BREAST: Primary | ICD-10-CM

## 2018-04-17 DIAGNOSIS — N63.11 MASS OF UPPER OUTER QUADRANT OF RIGHT BREAST: ICD-10-CM

## 2018-04-17 PROCEDURE — 99213 OFFICE O/P EST LOW 20 MIN: CPT | Performed by: NURSE PRACTITIONER

## 2018-04-17 ASSESSMENT — PAIN SCALES - GENERAL: PAINLEVEL: NO PAIN (0)

## 2018-04-17 NOTE — PATIENT INSTRUCTIONS
Schedule imaging at [place] as you are able.    At [facility], we strive to deliver an exceptional experience to you, every time we see you.  If you receive a survey in the mail, please send us back your thoughts. We really do value your feedback.    Based on your medical history, these are the current health maintenance/preventive care services that you are due for (some may have been done at this visit.)  There are no preventive care reminders to display for this patient.      Suggested websites for health information:  Www.[employer].org : Up to date and easily searchable information on multiple topics.  Www.medlineplus.gov : medication info, interactive tutorials, watch real surgeries online  Www.familydoctor.org : good info from the Academy of Family Physicians  Www.cdc.gov : public health info, travel advisories, epidemics (H1N1)  Www.aap.org : children's health info, normal development, vaccinations  Www.health.[facility].[place].us : [place] dept of health, public health issues in [place], N1N1    Your care team:                            Family Medicine Internal Medicine   MD Jack Muñoz MD Shantel Branch-Fleming, MD Katya Georgiev PA-C Megan Hill, APRN CNP    Perez Ho MD Pediatrics   Josue Cain PAHARRIS Kendall, CNP Arlin Kessler APRN CNP   MD Deonna Simmons MD Deborah Mielke, MD Kim Thein, APRN CNP      Clinic hours: Monday - Thursday 7 am-7 pm; Fridays 7 am-5 pm.   Urgent care: Monday - Friday 11 am-9 pm; Saturday and Sunday 9 am-5 pm.  Pharmacy : Monday -Thursday 8 am-8 pm; Friday 8 am-6 pm; Saturday and Sunday 9 am-5 pm.     Clinic: [phone]   Pharmacy: [phone]

## 2018-04-17 NOTE — MR AVS SNAPSHOT
After Visit Summary   4/17/2018    Arpita Zafar    MRN: [id number]           Patient Information     Date Of Birth          1985        Visit Information        Provider Department      4/17/2018 3:00 PM Sanjuana Kendall APRN CNP [facility]        Today's Diagnoses     Mass of upper inner quadrant of left breast    -  1    Mass of upper outer quadrant of right breast          Care Instructions    Schedule imaging at [place] as you are able.    At [facility], we strive to deliver an exceptional experience to you, every time we see you.  If you receive a survey in the mail, please send us back your thoughts. We really do value your feedback.    Based on your medical history, these are the current health maintenance/preventive care services that you are due for (some may have been done at this visit.)  There are no preventive care reminders to display for this patient.      Suggested websites for health information:  Www.[facility].org : Up to date and easily searchable information on multiple topics.  Www.medlineplus.gov : medication info, interactive tutorials, watch real surgeries online  Www.familydoctor.org : good info from the Academy of Family Physicians  Www.cdc.gov : public health info, travel advisories, epidemics (H1N1)  Www.aap.org : children's health info, normal development, vaccinations  Www.health.state.[place].us : [place] dept of health, public health issues in [place], N1N1    Your care team:                            Family Medicine Internal Medicine   MD Jack Muñoz MD Shantel Branch-Fleming, MD Katya Georgiev PA-C Megan Hill, APRN CNP Nam Ho, MD Pediatrics   STACY Dewey, MD Deonna Araujo CNP, MD Deborah Mielke, MD Kim Thein, APRN CNP      Clinic hours: Monday - Thursday 7 am-7 pm; Fridays 7 am-5 pm.   Urgent care: Monday - Friday 11 am-9  pm; Saturday and Sunday 9 am-5 pm.  Pharmacy : Monday -Thursday 8 am-8 pm; Friday 8 am-6 pm; Saturday and Sunday 9 am-5 pm.     Clinic: [phone]   Pharmacy: [phone]                Follow-ups after your visit        Future tests that were ordered for you today     Open Future Orders        Priority Expected Expires Ordered    US Breast Bilateral Complete 4 Quadrants Routine  4/17/2019 4/17/2018    MA Diagnostic Digital Bilateral Routine  4/17/2019 4/17/2018            Who to contact     If you have questions or need follow up information about today's clinic visit or your schedule please contact [facility] directly at [phone].  Normal or non-critical lab and imaging results will be communicated to you by MyChart, letter or phone within 4 business days after the clinic has received the results. If you do not hear from us within 7 days, please contact the clinic through [employer] or phone. If you have a critical or abnormal lab result, we will notify you by phone as soon as possible.  Submit refill requests through [employer] or call your pharmacy and they will forward the refill request to us. Please allow 3 business days for your refill to be completed.          Additional Information About Your Visit        [employer] Information     [employer] gives you secure access to your electronic health record. If you see a primary care provider, you can also send messages to your care team and make appointments. If you have questions, please call your primary care clinic.  If you do not have a primary care provider, please call [phone] and they will assist you.        Care EveryWhere ID     This is your Care EveryWhere ID. This could be used by other organizations to access your [place] medical records  [id number]        Your Vitals Were     Pulse Temperature Last Period Pulse Oximetry          81 98.3  F (36.8  C) (Oral) 03/26/2018 (Approximate) 97%         Blood Pressure from Last 3  Encounters:   04/17/18 127/87   03/23/18 124/82   03/19/18 138/87    Weight from Last 3 Encounters:   03/23/18 70.3 kg (155 lb)   03/19/18 70.3 kg (155 lb)   01/22/18 71 kg (156 lb 9.6 oz)                 Today's Medication Changes          These changes are accurate as of 4/17/18  3:22 PM.  If you have any questions, ask your nurse or doctor.               Stop taking these medicines if you haven't already. Please contact your care team if you have questions.     sulfamethoxazole-trimethoprim 800-160 MG per tablet   Commonly known as:  BACTRIM DS/SEPTRA DS                    Primary Care Provider Office Phone # Fax #    [facility] [phone]       [address]  [facility] [address]        Equal Access to Services     DREA PINO : David Sen, waepdro priest, maite kaalmalinette barbuor, mark bah . So [facility] [phone].    ATENCIÓN: Si habla español, tiene a sullivan disposición servicios gratuitos de asistencia lingüística. Nadiyaame al [phone].    We comply with applicable federal civil rights laws and [place] laws. We do not discriminate on the basis of race, color, national origin, age, disability, sex, sexual orientation, or gender identity.            Thank you!     Thank you for choosing [facility]  for your care. Our goal is always to provide you with excellent care. Hearing back from our patients is one way we can continue to improve our services. Please take a few minutes to complete the written survey that you may receive in the mail after your visit with us. Thank you!             Your Updated Medication List - Protect others around you: Learn how to safely use, store and throw away your medicines at www.disposemymeds.org.      Notice  As of 4/17/2018  3:22 PM    You have not been prescribed any medications.

## 2018-04-17 NOTE — PROGRESS NOTES
SUBJECTIVE:   Arpita Zafar is a 33 year old female who presents to clinic today for the following health issues:    Concern: Patient notice a lump in her right breast area. Patient declined lump on the left side of the breast.   noticed this yesterday.  No family history of breast cancer, maternal GM  of colon cancer.      Problem list and histories reviewed & adjusted, as indicated.  Additional history: as documented    Patient Active Problem List   Diagnosis     Cervical high risk HPV (human papillomavirus) test positive     No past surgical history on file.    Social History   Substance Use Topics     Smoking status: Former Smoker     Smokeless tobacco: Never Used     Alcohol use Yes     Family History   Problem Relation Age of Onset     DIABETES Father          No current outpatient prescriptions on file.     No Known Allergies    Reviewed and updated as needed this visit by clinical staff  Allergies  Meds       Reviewed and updated as needed this visit by Provider  Allergies  Meds  Problems         ROS:  Constitutional, HEENT, cardiovascular, pulmonary, gi and gu systems are negative, except as otherwise noted.    OBJECTIVE:     /87 (BP Location: Left arm, Patient Position: Chair, Cuff Size: Adult Regular)  Pulse 81  Temp 98.3  F (36.8  C) (Oral)  LMP 2018 (Approximate)  SpO2 97%  There is no height or weight on file to calculate BMI.  GENERAL: healthy, alert and no distress  BREAST: Mass at 11 oclock on left breast 1cm in diameter, no associated lymphadenopathy  Mass at 10 oclock on right breast 1.5 cm x 1 cm ovoid shape, no associated lymphadenopathy  LYMPH: no cervical, supraclavicular, axillary adenopathy    Diagnostic Test Results:  none     ASSESSMENT/PLAN:     1. Mass of upper inner quadrant of left breast  Will image.  - US Breast Bilateral Complete 4 Quadrants; Future  - MA Diagnostic Digital Bilateral; Future    2. Mass of upper outer quadrant of right breast  Will  image.  - US Breast Bilateral Complete 4 Quadrants; Future  - MA Diagnostic Digital Bilateral; Future    Patient Instructions     Schedule imaging at [place] as you are able.    At [facility], we strive to deliver an exceptional experience to you, every time we see you.  If you receive a survey in the mail, please send us back your thoughts. We really do value your feedback.    Based on your medical history, these are the current health maintenance/preventive care services that you are due for (some may have been done at this visit.)  There are no preventive care reminders to display for this patient.      Suggested websites for health information:  Www.Clinc!.StudentFunder : Up to date and easily searchable information on multiple topics.  Www.medlineplus.gov : medication info, interactive tutorials, watch real surgeries online  Www.familydoctor.org : good info from the Academy of Family Physicians  Www.cdc.gov : public health info, travel advisories, epidemics (H1N1)  Www.aap.org : children's health info, normal development, vaccinations  Www.health.[facility].[place].us : [place] dept of health, public health issues in [place], N1N1    Your care team:                            Family Medicine Internal Medicine   MD Jack Muñoz MD Shantel Branch-Fleming, MD Katya Georgiev PA-C Megan Hill, APRN CNP Nam Ho, MD Pediatrics   STACY Dewey, KOLBY Kessler APRMD Deonna Floyd CNP, MD Deborah Mielke, MD Kim Thein, APRN [facility]      Clinic hours: Monday - Thursday 7 am-7 pm; Fridays 7 am-5 pm.   Urgent care: Monday - Friday 11 am-9 pm; Saturday and Sunday 9 am-5 pm.  Pharmacy : Monday -Thursday 8 am-8 pm; Friday 8 am-6 pm; Saturday and Sunday 9 am-5 pm.     Clinic: [phone]   Pharmacy: [phone]            SALAS Singletary CNP  [facility]

## 2018-04-23 ENCOUNTER — RADIANT APPOINTMENT (OUTPATIENT)
Dept: ULTRASOUND IMAGING | Facility: CLINIC | Age: 33
End: 2018-04-23
Attending: NURSE PRACTITIONER
Payer: COMMERCIAL

## 2018-04-23 ENCOUNTER — RADIANT APPOINTMENT (OUTPATIENT)
Dept: MAMMOGRAPHY | Facility: CLINIC | Age: 33
End: 2018-04-23
Attending: NURSE PRACTITIONER
Payer: COMMERCIAL

## 2018-04-23 DIAGNOSIS — N63.22 MASS OF UPPER INNER QUADRANT OF LEFT BREAST: ICD-10-CM

## 2018-04-23 DIAGNOSIS — N63.11 MASS OF UPPER OUTER QUADRANT OF RIGHT BREAST: ICD-10-CM

## 2018-04-23 PROCEDURE — 76642 ULTRASOUND BREAST LIMITED: CPT | Mod: 50 | Performed by: RADIOLOGY

## 2018-04-23 PROCEDURE — 77066 DX MAMMO INCL CAD BI: CPT | Performed by: RADIOLOGY

## 2018-04-23 NOTE — LETTER
Arpita Zafar  [address] [facility] [place] [address]      April 23, 2018    Date of Exam:       Dear Arpita:    Thank you for your recent visit.    Result: Needs Biopsy. Based on your recent breast imaging, you have a suspicious area that usually requires a biopsy, at which time a small tissue sample would be taken from your breast.      Your breast imaging will become part of your medical file here at [facility] for at least 10 years. You are responsible for informing any new health care provider or mammography facility of the date and location of this examination. A report of your breast imaging results was sent to the health care provider you indicated.    We appreciate the opportunity to participate in your health care.    Sincerely,      Interpreting Radiologist  [facility]

## 2018-04-25 NOTE — PROGRESS NOTES
Noted. Results were discussed with the patient by radiology.  Carmen Beach MD MPH  Covering for Lety Lopez

## 2018-05-01 ENCOUNTER — RADIANT APPOINTMENT (OUTPATIENT)
Dept: MAMMOGRAPHY | Facility: CLINIC | Age: 33
End: 2018-05-01
Attending: NURSE PRACTITIONER
Payer: COMMERCIAL

## 2018-05-01 ENCOUNTER — RADIANT APPOINTMENT (OUTPATIENT)
Dept: ULTRASOUND IMAGING | Facility: CLINIC | Age: 33
End: 2018-05-01
Attending: NURSE PRACTITIONER
Payer: COMMERCIAL

## 2018-05-01 DIAGNOSIS — N63.11 MASS OF UPPER OUTER QUADRANT OF RIGHT BREAST: ICD-10-CM

## 2018-05-01 DIAGNOSIS — N63.22 MASS OF UPPER INNER QUADRANT OF LEFT BREAST: ICD-10-CM

## 2018-05-01 PROCEDURE — 88305 TISSUE EXAM BY PATHOLOGIST: CPT | Performed by: NURSE PRACTITIONER

## 2018-05-01 PROCEDURE — 88342 IMHCHEM/IMCYTCHM 1ST ANTB: CPT | Mod: 59 | Performed by: NURSE PRACTITIONER

## 2018-05-01 PROCEDURE — 88341 IMHCHEM/IMCYTCHM EA ADD ANTB: CPT | Mod: 59 | Performed by: NURSE PRACTITIONER

## 2018-05-01 PROCEDURE — 19083 BX BREAST 1ST LESION US IMAG: CPT | Mod: LT

## 2018-05-01 PROCEDURE — 88360 TUMOR IMMUNOHISTOCHEM/MANUAL: CPT | Performed by: NURSE PRACTITIONER

## 2018-05-01 RX ADMIN — Medication 1 MEQ: at 02:29

## 2018-05-04 ENCOUNTER — TELEPHONE (OUTPATIENT)
Dept: GENERAL RADIOLOGY | Facility: CLINIC | Age: 33
End: 2018-05-04

## 2018-05-04 LAB — COPATH REPORT: NORMAL

## 2018-05-04 NOTE — TELEPHONE ENCOUNTER
Called pt with results of breast biopsy. No answer. Left message with contact information for pt to return call.

## 2018-05-04 NOTE — TELEPHONE ENCOUNTER
Spoke with patient regarding left breast biopsy results, which indicate a benign fibroadenoma. Notified pt that the radiologist recommendation is clinical follow-up. Pt verbalized understanding of these results and all questions answered to her satisfaction.

## 2018-09-25 ENCOUNTER — HEALTH MAINTENANCE LETTER (OUTPATIENT)
Age: 33
End: 2018-09-25

## 2018-10-17 ENCOUNTER — HEALTH MAINTENANCE LETTER (OUTPATIENT)
Age: 33
End: 2018-10-17

## 2018-10-31 ENCOUNTER — OFFICE VISIT (OUTPATIENT)
Dept: OBGYN | Facility: CLINIC | Age: 33
End: 2018-10-31
Payer: COMMERCIAL

## 2018-10-31 VITALS
WEIGHT: 161.13 LBS | SYSTOLIC BLOOD PRESSURE: 133 MMHG | BODY MASS INDEX: 25.9 KG/M2 | HEART RATE: 83 BPM | TEMPERATURE: 98.2 F | HEIGHT: 66 IN | DIASTOLIC BLOOD PRESSURE: 91 MMHG

## 2018-10-31 DIAGNOSIS — R87.810 CERVICAL HIGH RISK HPV (HUMAN PAPILLOMAVIRUS) TEST POSITIVE: Primary | ICD-10-CM

## 2018-10-31 PROCEDURE — 87624 HPV HI-RISK TYP POOLED RSLT: CPT | Performed by: OBSTETRICS & GYNECOLOGY

## 2018-10-31 PROCEDURE — 88175 CYTOPATH C/V AUTO FLUID REDO: CPT | Performed by: OBSTETRICS & GYNECOLOGY

## 2018-10-31 PROCEDURE — 99202 OFFICE O/P NEW SF 15 MIN: CPT | Performed by: OBSTETRICS & GYNECOLOGY

## 2018-10-31 PROCEDURE — 88141 CYTOPATH C/V INTERPRET: CPT | Performed by: OBSTETRICS & GYNECOLOGY

## 2018-10-31 NOTE — MR AVS SNAPSHOT
After Visit Summary   10/31/2018    Arpita Zafar    MRN: [id number]           Patient Information     Date Of Birth          1985        Visit Information        Provider Department      10/31/2018 12:00 PM Corey Blakely MD [facility]        Care Instructions                                                        If you have any questions regarding your visit, Please contact your care team.     [employer] Access Services: [phone]    [facility] CLINIC HOURS TELEPHONE NUMBER       SHANIKA Cobos-      Joey Nguyen-Medical Assistant       Monday-[place]  8:00a.m-4:45 p.m  Tuesday-[place]  9:00a.m-11:45 a.m  Wednesday-[place] 8:00a.m-4:45 p.m.  Thursday-[place]  8:00a.m-4:45 p.m.  Friday-[place]  8:00a.m-4:45 p.m. [facility]  [address] [place] [address]. [place].  [place], [place] [address] [phone] ask for [facility]  [phone] Fax  Imaging [phone]    [facility] Labor and Delivery  [address] [place] [address] Dr.  [place], [place] [address] [phone]    [facility]  [address] [place] [address]  [phone] ask [facility]  [phone] Fax  Imaging [phone]     Urgent Care locations:    [facility] Monday-Friday  5 pm - 9 pm  Saturday and Sunday   9 am - 5 pm    Monday-Friday   11 am - 9 pm  Saturday and Sunday   9 am - 5 pm   [phone]       If you need a medication refill, please contact your pharmacy. Please allow 3 business days for your refill to be completed.  As always, Thank you for trusting us with your healthcare needs!            Follow-ups after your visit        Who to contact     If you have questions or need follow up information about today's clinic visit or your schedule please contact [facility] directly at [phone].  Normal or non-critical lab and  "imaging results will be communicated to you by MyChart, letter or phone within 4 business days after the clinic has received the results. If you do not hear from us within 7 days, please contact the clinic through [employer] or phone. If you have a critical or abnormal lab result, we will notify you by phone as soon as possible.  Submit refill requests through [employer] or call your pharmacy and they will forward the refill request to us. Please allow 3 business days for your refill to be completed.          Additional Information About Your Visit        [employer] Information     [employer] gives you secure access to your electronic health record. If you see a primary care provider, you can also send messages to your care team and make appointments. If you have questions, please call your primary care clinic.  If you do not have a primary care provider, please call [phone] and they will assist you.        Care EveryWhere ID     This is your Care EveryWhere ID. This could be used by other organizations to access your [place] medical records  [id number]        Your Vitals Were     Pulse Temperature Height Last Period Breastfeeding? BMI (Body Mass Index)    83 98.2  F (36.8  C) (Oral) 5' 6\" (1.676 m) 10/20/2018 (Approximate) No 26.01 kg/m2       Blood Pressure from Last 3 Encounters:   10/31/18 (!) 133/91   04/17/18 127/87   03/23/18 124/82    Weight from Last 3 Encounters:   10/31/18 161 lb 2 oz (73.1 kg)   03/23/18 155 lb (70.3 kg)   03/19/18 155 lb (70.3 kg)              Today, you had the following     No orders found for display       Primary Care Provider Office Phone # Fax #    [facility] [phone]       [address]  [facility] [address]        Equal Access to Services     THANH PINO AH: Hadii kin ku hadasho Soomaali, waaxda luqadaha, qaybta kaalmada adeegyada, waxay pat mejía. So [facility] [phone].    ATENCIÓN: Si chuckyla español, galdino a sullivan " disposición servicios gratuitos de asistencia lingüística. Sheyla young [phone].    We comply with applicable federal civil rights laws and [place] laws. We do not discriminate on the basis of race, color, national origin, age, disability, sex, sexual orientation, or gender identity.            Thank you!     Thank you for choosing [facility]  for your care. Our goal is always to provide you with excellent care. Hearing back from our patients is one way we can continue to improve our services. Please take a few minutes to complete the written survey that you may receive in the mail after your visit with us. Thank you!             Your Updated Medication List - Protect others around you: Learn how to safely use, store and throw away your medicines at www.disposemymeds.org.      Notice  As of 10/31/2018 12:07 PM    You have not been prescribed any medications.

## 2018-10-31 NOTE — PATIENT INSTRUCTIONS
If you have any questions regarding your visit, Please contact your care team.     [employer] [facility] [facility] [employer] Services: [phone]    Women s Health CLINIC HOURS TELEPHONE NUMBER       SHANIKA Cobos-      Joey Nguyen-Medical Assistant       Monday-[place]  8:00a.m-4:45 p.m  Tuesday-[place]  9:00a.m-11:45 a.m  Wednesday-[place] 8:00a.m-4:45 p.m.  Thursday-[place]  8:00a.m-4:45 p.m.  Friday-[place]  8:00a.m-4:45 p.m. [facility]  [address] [place] [address]  [phone] ask [facility]  [phone] Fax  Imaging [phone]    [facility] Labor and Delivery  [address] [place] [address] [place] [address]  [phone]    [facility]  [address] [place] [address]  [phone] [facility]  [phone] Fax  Imaging [phone]     Urgent Care locations:    [place]        [place] Monday-Friday  5 pm - 9 pm  Saturday and Sunday   9 am - 5 pm    Monday-Friday   11 am - 9 pm  Saturday and Sunday   9 am - 5 pm   [phone]       If you need a medication refill, please contact your pharmacy. Please allow 3 business days for your refill to be completed.  As always, Thank you for trusting us with your healthcare needs!

## 2018-11-04 NOTE — PROGRESS NOTES
"OB-GYN Problem-Oriented Visit or Consultation      Arpita Zafar is a 33 year old year old P 0 who presents with a chief complaint of follow-up Pap.  Referred by self.  Patient's last menstrual period was 10/20/2018 (approximate).    HPI:     No symptoms now. Pap was normal last yr but other HRHPV pos. Menses q 28-30 days x 4-5 days. Contraceptive method is withdrawal. Plans pregnancy relatively soon.     Past medical, obstetrical, surgical, family and social history reviewed and as noted or updated in chart.     Allergies, meds and supplements are as noted or updated in chart.      ROS:   Systems reviewed include:  constitutional, genitourinary, psychological, hematologic/lymphatic and endocrine.    These systems were negative for significant symptoms except for the following additional: none; see HPI.    EXAM:  VS as noted. BP (!) 133/91 (BP Location: Left arm, Patient Position: Sitting, Cuff Size: Adult Regular)  Pulse 83  Temp 98.2  F (36.8  C) (Oral)  Ht 5' 6\" (1.676 m)  Wt 161 lb 2 oz (73.1 kg)  LMP 10/20/2018 (Approximate)  Breastfeeding? No  BMI 26.01 kg/m2  Constitutionally normal.     Pelvis was  normal or negative except for, or in particular noting, the following  pertinent findings: none.      Assessment:   Encounter Diagnoses   Name Primary?     Cervical high risk HPV (human papillomavirus) test positive Yes       I reviewed the condition, causes, differential diagnosis, prognosis, evaluation and management considerations and options.  Questions answered and information given. See orders.         Plan and Recommendations: Pap/HRHPV sent. Follow-up per protocol. Basic preconception advice briefly reviewed.   Total encounter time (physician together with patient) = 20min. Direct counseling, education and care coordination time (physician together with patient) = 10min.    A/P:  Arpita was seen today for gyn exam.    Diagnoses and all orders for this visit:    Cervical high risk HPV (human " papillomavirus) test positive  -     Pap imaged thin layer diagnostic with HPV (select HPV order below)  -     HPV High Risk Types DNA Cervical        Corey Blakely MD

## 2018-11-05 LAB
COPATH REPORT: ABNORMAL
PAP: ABNORMAL

## 2018-11-06 LAB
FINAL DIAGNOSIS: ABNORMAL
HPV HR 12 DNA CVX QL NAA+PROBE: POSITIVE
HPV16 DNA SPEC QL NAA+PROBE: NEGATIVE
HPV18 DNA SPEC QL NAA+PROBE: NEGATIVE
SPECIMEN DESCRIPTION: ABNORMAL
SPECIMEN SOURCE CVX/VAG CYTO: ABNORMAL

## 2018-11-15 ENCOUNTER — OFFICE VISIT (OUTPATIENT)
Dept: OBGYN | Facility: CLINIC | Age: 33
End: 2018-11-15
Payer: COMMERCIAL

## 2018-11-15 VITALS — DIASTOLIC BLOOD PRESSURE: 88 MMHG | TEMPERATURE: 97.9 F | HEART RATE: 94 BPM | SYSTOLIC BLOOD PRESSURE: 146 MMHG

## 2018-11-15 DIAGNOSIS — N87.1 MODERATE CERVICAL DYSPLASIA: ICD-10-CM

## 2018-11-15 DIAGNOSIS — R87.611 ATYPICAL SQUAMOUS CELLS CANNOT EXCLUDE HIGH GRADE SQUAMOUS INTRAEPITHELIAL LESION ON CYTOLOGIC SMEAR OF CERVIX (ASC-H): Primary | ICD-10-CM

## 2018-11-15 DIAGNOSIS — Z32.02 NEGATIVE PREGNANCY TEST: ICD-10-CM

## 2018-11-15 LAB — BETA HCG QUAL IFA URINE: NEGATIVE

## 2018-11-15 PROCEDURE — 88305 TISSUE EXAM BY PATHOLOGIST: CPT | Performed by: OBSTETRICS & GYNECOLOGY

## 2018-11-15 PROCEDURE — 57456 ENDOCERV CURETTAGE W/SCOPE: CPT | Performed by: OBSTETRICS & GYNECOLOGY

## 2018-11-15 PROCEDURE — 84703 CHORIONIC GONADOTROPIN ASSAY: CPT | Performed by: OBSTETRICS & GYNECOLOGY

## 2018-11-15 PROCEDURE — 88175 CYTOPATH C/V AUTO FLUID REDO: CPT | Performed by: OBSTETRICS & GYNECOLOGY

## 2018-11-15 PROCEDURE — 88141 CYTOPATH C/V INTERPRET: CPT | Performed by: OBSTETRICS & GYNECOLOGY

## 2018-11-15 PROCEDURE — 88342 IMHCHEM/IMCYTCHM 1ST ANTB: CPT | Performed by: OBSTETRICS & GYNECOLOGY

## 2018-11-15 NOTE — MR AVS SNAPSHOT
After Visit Summary   11/15/2018    Arpita Zafar    MRN: [id number]           Patient Information     Date Of Birth          1985        Visit Information        Provider Department      11/15/2018 1:00 PM Corey Blakely MD; CON[facility] COLP/LEEP [facility]        Today's Diagnoses     Atypical squamous cells cannot exclude high grade squamous intraepithelial lesion on cytologic smear of cervix (ASC-H)    -  1    Negative pregnancy test          Care Instructions                                                        If you have any questions regarding your visit, Please contact your care team.     "[employer] Access Services: [phone]    [facility] CLINIC HOURS TELEPHONE NUMBER       Corey Blakely M.D.      Sarah-      Joey Nguyen-Medical Assistant       Monday-[place]  8:00a.m-4:45 p.m  Tuesday-[place]  9:00a.m-11:45 a.m  Wednesday-[place] 8:00a.m-4:45 p.m.  Thursday-[place]  8:00a.m-4:45 p.m.  Friday-[place]  8:00a.m-4:45 p.m. [facility]  [address] [place] [address]  [phone] [facility]  [phone] Fax  Imaging [phone]    [facility] Labor and Delivery  [address] [place] [address] [place] [address]  [phone]    [facility]  [address] [address]  [phone] [facility]  [phone] Fax  Imaging [phone]     Urgent Care locations:    [place]        [place] Monday-Friday  5 pm - 9 pm  Saturday and Sunday   9 am - 5 pm    Monday-Friday   11 am - 9 pm  Saturday and Sunday   9 am - 5 pm   [phone]       If you need a medication refill, please contact your pharmacy. Please allow 3 business days for your refill to be completed.  As always, Thank you for trusting us with your healthcare needs!            Follow-ups after your visit        Who to  contact     If you have questions or need follow up information about today's clinic visit or your schedule please contact [facility] directly at [phone].  Normal or non-critical lab and imaging results will be communicated to you by MyChart, letter or phone within 4 business days after the clinic has received the results. If you do not hear from us within 7 days, please contact the clinic through [employer] or phone. If you have a critical or abnormal lab result, we will notify you by phone as soon as possible.  Submit refill requests through [employer] or call your pharmacy and they will forward the refill request to us. Please allow 3 business days for your refill to be completed.          Additional Information About Your Visit        [employer] Information     [employer] gives you secure access to your electronic health record. If you see a primary care provider, you can also send messages to your care team and make appointments. If you have questions, please call your primary care clinic.  If you do not have a primary care provider, please call [phone] and they will assist you.        Care EveryWhere ID     This is your Care EveryWhere ID. This could be used by other organizations to access your [place] medical records  [id number]        Your Vitals Were     Pulse Temperature Last Period Breastfeeding?          94 97.9  F (36.6  C) (Oral) 10/20/2018 (Approximate) No         Blood Pressure from Last 3 Encounters:   11/15/18 146/88   10/31/18 (!) 133/91   04/17/18 127/87    Weight from Last 3 Encounters:   10/31/18 161 lb 2 oz (73.1 kg)   03/23/18 155 lb (70.3 kg)   03/19/18 155 lb (70.3 kg)              We Performed the Following     Beta HCG qual IFA urine        Primary Care Provider Office Phone # Fax #    [facility] [phone]       [address]  [facility] [address]        Equal Access to Services     DREA PINO AH: David Sen,  mirtha priest, sukhishlelie finleyconstantin morgankiera, mark biankain hayaan wandamarii bienvenidovijaya laLynnshaye ah. So [facility] [phone].    ATENCIÓN: Si habla shabnamañol, tiene a sullivan disposición servicios gratuitos de asistencia lingüística. Llame al [phone].    We comply with applicable federal civil rights laws and [place] laws. We do not discriminate on the basis of race, color, national origin, age, disability, sex, sexual orientation, or gender identity.            Thank you!     Thank you for choosing [facility]  for your care. Our goal is always to provide you with excellent care. Hearing back from our patients is one way we can continue to improve our services. Please take a few minutes to complete the written survey that you may receive in the mail after your visit with us. Thank you!             Your Updated Medication List - Protect others around you: Learn how to safely use, store and throw away your medicines at www.disposemymeds.org.      Notice  As of 11/15/2018  1:13 PM    You have not been prescribed any medications.

## 2018-11-15 NOTE — PATIENT INSTRUCTIONS
If you have any questions regarding your visit, Please contact your care team.     [employer] Services: [phone]    Women s Health CLINIC HOURS TELEPHONE NUMBER       SHANIKA Cobos-      Joey Nguyen-Medical Assistant       Monday-[place]  8:00a.m-4:45 p.m  Tuesday-[place]  9:00a.m-11:45 a.m  Wednesday-[place] 8:00a.m-4:45 p.m.  Thursday-[place]  8:00a.m-4:45 p.m.  Friday-[place]  8:00a.m-4:45 p.m. [facility]  [address] [place] [address]  [phone] ask [facility]  [phone] Fax  Imaging [phone]    [facility] Labor and Delivery  [address] [place] [address] [place] [address]  [phone]    [facility]  [address] [place] [address]  [phone] [facility]  [phone] Fax  Imaging [phone]     Urgent Care locations:    [place]        [place] Monday-Friday  5 pm - 9 pm  Saturday and Sunday   9 am - 5 pm    Monday-Friday   11 am - 9 pm  Saturday and Sunday   9 am - 5 pm   [phone]       If you need a medication refill, please contact your pharmacy. Please allow 3 business days for your refill to be completed.  As always, Thank you for trusting us with your healthcare needs!

## 2018-11-17 NOTE — PROGRESS NOTES
OB-GYN Problem-Oriented Visit or Consultation      Arpita Zafar is a 33 year old year old P 0 who presents with a chief complaint of abnormal Pap.  Referred by self.  Patient's last menstrual period was 10/20/2018 (approximate).    HPI:     See prior notes. No changes.     8/16/17 NIL pap, + HR HPV (not 16 or 18). Plan: cotest in 1 year.  10/31/18 ASC-H pap, + HR HPV (not 16 or 18). Plan colp.    Past medical, obstetrical, surgical, family and social history reviewed and as noted or updated in chart.     Allergies, meds and supplements are as noted or updated in chart.      ROS:   Systems reviewed include:  constitutional, gastrointestinal, genitourinary, integumentary, psychological, hematologic/lymphatic and endocrine.    These systems were negative for significant symptoms except for the following additional: none; see HPI.    EXAM:  VS as noted. /88 (BP Location: Right arm, Patient Position: Chair, Cuff Size: Adult Regular)  Pulse 94  Temp 97.9  F (36.6  C) (Oral)  LMP 10/20/2018 (Approximate)  Breastfeeding? No  Constitutionally normal.     PROCEDURE: Discussed procedure, indications, risks, benefits and alternatives of colposcopy. Patient understood and desired to proceed. Preliminary endocervical cytology obtained. The cervix was inspected using acetic acid and Lugol's solution. The exam was unsatisfactory with a WE lesion confined to the narrow nulliparous endocervical canal. Endocervical speculum not used. ECC done with tenaculum also used to stabilize cervix.     EMB not done.  Anesthesia: none.  Procedure Summary: Patient tolerated procedure well. Complications: none.  Colposcopic Impression: HPV effect and ESTELA 1.   Plan: Anticipate observation and close follow-up unless advanced dysplasia which will require a narrow loop electrosurgical excision procedure conization. Instructions given to patient.     Assessment:   Encounter Diagnoses   Name Primary?     Atypical squamous cells cannot  exclude high grade squamous intraepithelial lesion on cytologic smear of cervix (ASC-H) Yes     Negative pregnancy test        I reviewed the condition, causes, differential diagnosis, prognosis, evaluation and management considerations and options.  Questions answered and information given. See orders.         Plan and Recommendations: Await Path.     A/P:  Arpita was seen today for colposcopy.    Diagnoses and all orders for this visit:    Atypical squamous cells cannot exclude high grade squamous intraepithelial lesion on cytologic smear of cervix (ASC-H)  -     COLP CERVIX/UPPER VAGINA W ENDOCERV CURETT  -     Surgical pathology exam  -     Pap imaged thin layer diagnostic only    Negative pregnancy test  -     Beta HCG qual IFA urine        Corey Blakely MD

## 2018-11-19 LAB
COPATH REPORT: ABNORMAL
PAP: ABNORMAL

## 2018-11-20 LAB — COPATH REPORT: NORMAL

## 2019-01-04 ENCOUNTER — OFFICE VISIT (OUTPATIENT)
Dept: FAMILY MEDICINE | Facility: CLINIC | Age: 34
End: 2019-01-04
Payer: COMMERCIAL

## 2019-01-04 VITALS
RESPIRATION RATE: 18 BRPM | DIASTOLIC BLOOD PRESSURE: 85 MMHG | WEIGHT: 160.4 LBS | BODY MASS INDEX: 25.78 KG/M2 | HEIGHT: 66 IN | HEART RATE: 80 BPM | OXYGEN SATURATION: 97 % | TEMPERATURE: 98.2 F | SYSTOLIC BLOOD PRESSURE: 128 MMHG

## 2019-01-04 DIAGNOSIS — R87.611 ATYPICAL SQUAMOUS CELLS CANNOT EXCLUDE HIGH GRADE SQUAMOUS INTRAEPITHELIAL LESION ON CYTOLOGIC SMEAR OF CERVIX (ASC-H): ICD-10-CM

## 2019-01-04 DIAGNOSIS — Z01.818 PREOP GENERAL PHYSICAL EXAM: Primary | ICD-10-CM

## 2019-01-04 DIAGNOSIS — R87.810 CERVICAL HIGH RISK HPV (HUMAN PAPILLOMAVIRUS) TEST POSITIVE: ICD-10-CM

## 2019-01-04 PROCEDURE — 99214 OFFICE O/P EST MOD 30 MIN: CPT | Performed by: NURSE PRACTITIONER

## 2019-01-04 ASSESSMENT — MIFFLIN-ST. JEOR: SCORE: 1446.38

## 2019-01-04 ASSESSMENT — PAIN SCALES - GENERAL: PAINLEVEL: NO PAIN (0)

## 2019-01-04 NOTE — PROGRESS NOTES
Faxed Pre-op notes, no labs and no Ekg to [facility], [phone],  Right fax confirmed at 3:22 pm today, 1/4/19.  Arabella Byrne MA/  For Teams Saúl

## 2019-01-04 NOTE — PROGRESS NOTES
[facility]  [address] [address]  [phone]  Dept: [phone]    PRE-OP EVALUATION:  Today's date: 2019    Arpita Zafar (: 1985) presents for pre-operative evaluation assessment as requested by Dr. Blakely.  She requires evaluation and anesthesia risk assessment prior to undergoing surgery/procedure for treatment of abnormal pap smear.    Proposed Surgery/ Procedure: LEEP   Date of Surgery/ Procedure: 01/15/2019  Time of Surgery/ Procedure: 11 am  Hospital/Surgical Facility: [facility]   Primary Physician: [facility]  Type of Anesthesia Anticipated: Local    Patient has a Health Care Directive or Living Will:  NO    1. NO - Do you have a history of heart attack, stroke, stent, bypass or surgery on an artery in the head, neck, heart or legs?  2. NO - Do you ever have any pain or discomfort in your chest?  3. NO - Do you have a history of  Heart Failure?  4. NO - Are you troubled by shortness of breath when: walking on the level, up a slight hill or at night?  5. NO - Do you currently have a cold, bronchitis or other respiratory infection?  6. NO - Do you have a cough, shortness of breath or wheezing?  7. NO - Do you sometimes get pains in the calves of your legs when you walk?  8. NO - Do you or anyone in your family have previous history of blood clots?  9. NO - Do you or does anyone in your family have a serious bleeding problem such as prolonged bleeding following surgeries or cuts?  10. NO - Have you ever had problems with anemia or been told to take iron pills?  11. NO - Have you had any abnormal blood loss such as black, tarry or bloody stools, or abnormal vaginal bleeding?  12. NO - Have you ever had a blood transfusion?  13. NO - Have you or any of your relatives ever had problems with anesthesia?  14. NO - Do you have sleep apnea, excessive snoring or daytime drowsiness?  15. NO - Do you have any prosthetic  heart valves?  16. NO - Do you have prosthetic joints?  17. NO - Is there any chance that you may be pregnant?      HPI:     HPI related to upcoming procedure: ASCUS-h and HPV positive pap.      MEDICAL HISTORY:     Patient Active Problem List    Diagnosis Date Noted     Atypical squamous cells cannot exclude high grade squamous intraepithelial lesion on cytologic smear of cervix (ASC-H) 11/06/2018     Priority: Medium     8/16/17 NIL pap, + HR HPV (not 16 or 18). Plan: cotest in 1 year.  10/31/18 ASC-H pap, + HR HPV (not 16 or 18). Plan colp.  11/15/18 [place]: ESTELA 2, Pap: ASC-H. Plan: LEEP       Cervical high risk HPV (human papillomavirus) test positive 08/16/2017     Priority: Medium      Past Medical History:   Diagnosis Date     Abnormal Pap smear of cervix 10/31/2018    See problem list     Cervical high risk HPV (human papillomavirus) test positive 8/16/2017, 10/31/18    See problem list     Past Surgical History:   Procedure Laterality Date     HC COLP CERVIX/UPPER VAGINA W ENDOCERV CURETT  2018      BREAST BIOPSY CORE NEEDLE LEFT  2018     No current outpatient medications on file.     OTC products: no recent use of OTC ASA, NSAIDS or Steroids, no use of herbal medications or other supplements    No Known Allergies   Latex Allergy: NO    Social History     Tobacco Use     Smoking status: Former Smoker     Types: Cigarettes     Last attempt to quit: 10/31/2015     Years since quitting: 3.1     Smokeless tobacco: Never Used   Substance Use Topics     Alcohol use: Yes     History   Drug Use No       REVIEW OF SYSTEMS:   CONSTITUTIONAL: NEGATIVE for fever, chills, change in weight  INTEGUMENTARY/SKIN: NEGATIVE for worrisome rashes, moles or lesions  EYES: NEGATIVE for vision changes or irritation  ENT/MOUTH: NEGATIVE for ear, mouth and throat problems  RESP: NEGATIVE for significant cough or SOB  BREAST: NEGATIVE for masses, tenderness or discharge  CV: NEGATIVE for chest pain, palpitations or peripheral  "edema  GI: NEGATIVE for nausea, abdominal pain, heartburn, or change in bowel habits  : NEGATIVE for frequency, dysuria, or hematuria  MUSCULOSKELETAL: NEGATIVE for significant arthralgias or myalgia  NEURO: NEGATIVE for weakness, dizziness or paresthesias  ENDOCRINE: NEGATIVE for temperature intolerance, skin/hair changes  HEME: NEGATIVE for bleeding problems  PSYCHIATRIC: NEGATIVE for changes in mood or affect    EXAM:   /85 (BP Location: Left arm, Patient Position: Sitting, Cuff Size: Adult Regular)   Pulse 80   Temp 98.2  F (36.8  C) (Oral)   Resp 18   Ht 1.664 m (5' 5.5\")   Wt 72.8 kg (160 lb 6.4 oz)   LMP 12/31/2018   SpO2 97%   BMI 26.29 kg/m      GENERAL APPEARANCE: healthy, alert and no distress     EYES: EOMI, PERRL     HENT: ear canals and TM's normal and nose and mouth without ulcers or lesions     NECK: no adenopathy, no asymmetry, masses, or scars and thyroid normal to palpation     RESP: lungs clear to auscultation - no rales, rhonchi or wheezes     CV: regular rates and rhythm, normal S1 S2, no S3 or S4 and no murmur, click or rub     ABDOMEN:  soft, nontender, no HSM or masses and bowel sounds normal     MS: extremities normal- no gross deformities noted, no evidence of inflammation in joints, FROM in all extremities.     SKIN: no suspicious lesions or rashes     NEURO: Normal strength and tone, sensory exam grossly normal, mentation intact and speech normal     PSYCH: mentation appears normal. and affect normal/bright     LYMPHATICS: No cervical adenopathy    DIAGNOSTICS:   EKG: Not indicated due to non-vascular surgery and low risk of event (age <65 and without cardiac risk factors)    Recent Labs   Lab Test 08/16/17  0953   HGB 14.9         POTASSIUM 3.9   CR 0.83        IMPRESSION:   Reason for surgery/procedure: ASCUS-H and HPV high risk on pap smear    The proposed surgical procedure is considered INTERMEDIATE risk.    REVISED CARDIAC RISK INDEX  The patient has " the following serious cardiovascular risks for perioperative complications such as (MI, PE, VFib and 3  AV Block):  No serious cardiac risks  INTERPRETATION: 0 risks: Class I (very low risk - 0.4% complication rate)    The patient has the following additional risks for perioperative complications:  No identified additional risks      ICD-10-CM    1. Preop general physical exam Z01.818    2. Cervical high risk HPV (human papillomavirus) test positive R87.810    3. Atypical squamous cells cannot exclude high grade squamous intraepithelial lesion on cytologic smear of cervix (ASC-H) R87.611        RECOMMENDATIONS:     --Patient is on no chronic medications    APPROVAL GIVEN to proceed with proposed procedure, without further diagnostic evaluation       Signed Electronically by: SALAS Singletary CNP    Copy of this evaluation report is provided to requesting physician.    [facility] Preop Guidelines    Revised Cardiac Risk Index

## 2019-01-04 NOTE — PATIENT INSTRUCTIONS
Patient Instructions     Please avoid fish oil, aspirin,  ibuprofen, motrin, advil, aleve, or naproxen 7 days prior to surgery.    Before Your Surgery      Call your surgeon if there is any change in your health. This includes signs of a cold or flu (such as a sore throat, runny nose, cough, rash or fever).    Do not smoke, drink alcohol or take over the counter medicine (unless your surgeon or primary care doctor tells you to) for the 24 hours before and after surgery.    If you take prescribed drugs: Follow your doctor s orders about which medicines to take and which to stop until after surgery.    Eating and drinking prior to surgery: follow the instructions from your surgeon    Take a shower or bath the night before surgery. Use the soap your surgeon gave you to gently clean your skin. If you do not have soap from your surgeon, use your regular soap. Do not shave or scrub the surgery site.  Wear clean pajamas and have clean sheets on your bed.

## 2019-01-14 ENCOUNTER — ANESTHESIA EVENT (OUTPATIENT)
Dept: SURGERY | Facility: AMBULATORY SURGERY CENTER | Age: 34
End: 2019-01-14

## 2019-01-15 ENCOUNTER — ANESTHESIA (OUTPATIENT)
Dept: SURGERY | Facility: AMBULATORY SURGERY CENTER | Age: 34
End: 2019-01-15
Payer: COMMERCIAL

## 2019-01-15 ENCOUNTER — SURGERY (OUTPATIENT)
Age: 34
End: 2019-01-15
Payer: COMMERCIAL

## 2019-01-15 ENCOUNTER — HOSPITAL ENCOUNTER (OUTPATIENT)
Facility: AMBULATORY SURGERY CENTER | Age: 34
Discharge: HOME | End: 2019-01-15
Attending: OBSTETRICS & GYNECOLOGY | Admitting: OBSTETRICS & GYNECOLOGY
Payer: COMMERCIAL

## 2019-01-15 VITALS
RESPIRATION RATE: 16 BRPM | OXYGEN SATURATION: 99 % | DIASTOLIC BLOOD PRESSURE: 74 MMHG | TEMPERATURE: 98.6 F | SYSTOLIC BLOOD PRESSURE: 120 MMHG

## 2019-01-15 LAB — BETA HCG QUAL IFA URINE: NEGATIVE

## 2019-01-15 PROCEDURE — 57522 CONIZATION OF CERVIX: CPT | Performed by: OBSTETRICS & GYNECOLOGY

## 2019-01-15 PROCEDURE — 57522 CONIZATION OF CERVIX: CPT

## 2019-01-15 PROCEDURE — G8918 PT W/O PREOP ORDER IV AB PRO: HCPCS

## 2019-01-15 PROCEDURE — 88307 TISSUE EXAM BY PATHOLOGIST: CPT | Performed by: OBSTETRICS & GYNECOLOGY

## 2019-01-15 PROCEDURE — 84703 CHORIONIC GONADOTROPIN ASSAY: CPT | Performed by: ANESTHESIOLOGY

## 2019-01-15 PROCEDURE — G8907 PT DOC NO EVENTS ON DISCHARG: HCPCS

## 2019-01-15 RX ORDER — PROPOFOL 10 MG/ML
INJECTION, EMULSION INTRAVENOUS CONTINUOUS PRN
Status: DISCONTINUED | OUTPATIENT
Start: 2019-01-15 | End: 2019-01-15

## 2019-01-15 RX ORDER — ACETAMINOPHEN 325 MG/1
975 TABLET ORAL ONCE
Status: COMPLETED | OUTPATIENT
Start: 2019-01-15 | End: 2019-01-15

## 2019-01-15 RX ORDER — ONDANSETRON 2 MG/ML
INJECTION INTRAMUSCULAR; INTRAVENOUS PRN
Status: DISCONTINUED | OUTPATIENT
Start: 2019-01-15 | End: 2019-01-15

## 2019-01-15 RX ORDER — IODINE AND POTASSIUM IODIDE 50; 100 MG/ML; MG/ML
LIQUID ORAL PRN
Status: DISCONTINUED | OUTPATIENT
Start: 2019-01-15 | End: 2019-01-15 | Stop reason: HOSPADM

## 2019-01-15 RX ORDER — NALOXONE HYDROCHLORIDE 0.4 MG/ML
.1-.4 INJECTION, SOLUTION INTRAMUSCULAR; INTRAVENOUS; SUBCUTANEOUS
Status: DISCONTINUED | OUTPATIENT
Start: 2019-01-15 | End: 2019-01-16 | Stop reason: HOSPADM

## 2019-01-15 RX ORDER — LIDOCAINE HYDROCHLORIDE AND EPINEPHRINE 10; 10 MG/ML; UG/ML
INJECTION, SOLUTION INFILTRATION; PERINEURAL PRN
Status: DISCONTINUED | OUTPATIENT
Start: 2019-01-15 | End: 2019-01-15 | Stop reason: HOSPADM

## 2019-01-15 RX ORDER — ONDANSETRON 4 MG/1
4 TABLET, ORALLY DISINTEGRATING ORAL EVERY 30 MIN PRN
Status: DISCONTINUED | OUTPATIENT
Start: 2019-01-15 | End: 2019-01-16 | Stop reason: HOSPADM

## 2019-01-15 RX ORDER — ACETIC ACID 3 %
LIQUID (ML) MISCELLANEOUS PRN
Status: DISCONTINUED | OUTPATIENT
Start: 2019-01-15 | End: 2019-01-15 | Stop reason: HOSPADM

## 2019-01-15 RX ORDER — DEXAMETHASONE SODIUM PHOSPHATE 4 MG/ML
4 INJECTION, SOLUTION INTRA-ARTICULAR; INTRALESIONAL; INTRAMUSCULAR; INTRAVENOUS; SOFT TISSUE EVERY 10 MIN PRN
Status: DISCONTINUED | OUTPATIENT
Start: 2019-01-15 | End: 2019-01-16 | Stop reason: HOSPADM

## 2019-01-15 RX ORDER — KETOROLAC TROMETHAMINE 30 MG/ML
INJECTION, SOLUTION INTRAMUSCULAR; INTRAVENOUS PRN
Status: DISCONTINUED | OUTPATIENT
Start: 2019-01-15 | End: 2019-01-15

## 2019-01-15 RX ORDER — FERRIC SUBSULFATE 0.21 G/G
LIQUID TOPICAL PRN
Status: DISCONTINUED | OUTPATIENT
Start: 2019-01-15 | End: 2019-01-15 | Stop reason: HOSPADM

## 2019-01-15 RX ORDER — ONDANSETRON 2 MG/ML
4 INJECTION INTRAMUSCULAR; INTRAVENOUS EVERY 30 MIN PRN
Status: DISCONTINUED | OUTPATIENT
Start: 2019-01-15 | End: 2019-01-16 | Stop reason: HOSPADM

## 2019-01-15 RX ORDER — LIDOCAINE HYDROCHLORIDE 20 MG/ML
INJECTION, SOLUTION INFILTRATION; PERINEURAL PRN
Status: DISCONTINUED | OUTPATIENT
Start: 2019-01-15 | End: 2019-01-15

## 2019-01-15 RX ORDER — FENTANYL CITRATE 50 UG/ML
25-50 INJECTION, SOLUTION INTRAMUSCULAR; INTRAVENOUS
Status: DISCONTINUED | OUTPATIENT
Start: 2019-01-15 | End: 2019-01-16 | Stop reason: HOSPADM

## 2019-01-15 RX ORDER — OXYCODONE HYDROCHLORIDE 5 MG/1
10 TABLET ORAL EVERY 4 HOURS PRN
Status: DISCONTINUED | OUTPATIENT
Start: 2019-01-15 | End: 2019-01-16 | Stop reason: HOSPADM

## 2019-01-15 RX ORDER — FENTANYL CITRATE 50 UG/ML
INJECTION, SOLUTION INTRAMUSCULAR; INTRAVENOUS PRN
Status: DISCONTINUED | OUTPATIENT
Start: 2019-01-15 | End: 2019-01-15

## 2019-01-15 RX ORDER — LIDOCAINE 40 MG/G
CREAM TOPICAL
Status: DISCONTINUED | OUTPATIENT
Start: 2019-01-15 | End: 2019-01-16 | Stop reason: HOSPADM

## 2019-01-15 RX ORDER — DEXAMETHASONE SODIUM PHOSPHATE 4 MG/ML
INJECTION, SOLUTION INTRA-ARTICULAR; INTRALESIONAL; INTRAMUSCULAR; INTRAVENOUS; SOFT TISSUE PRN
Status: DISCONTINUED | OUTPATIENT
Start: 2019-01-15 | End: 2019-01-15

## 2019-01-15 RX ORDER — SODIUM CHLORIDE, SODIUM LACTATE, POTASSIUM CHLORIDE, CALCIUM CHLORIDE 600; 310; 30; 20 MG/100ML; MG/100ML; MG/100ML; MG/100ML
INJECTION, SOLUTION INTRAVENOUS CONTINUOUS
Status: DISCONTINUED | OUTPATIENT
Start: 2019-01-15 | End: 2019-01-16 | Stop reason: HOSPADM

## 2019-01-15 RX ORDER — PHYSOSTIGMINE SALICYLATE 1 MG/ML
1.2 INJECTION INTRAVENOUS
Status: DISCONTINUED | OUTPATIENT
Start: 2019-01-15 | End: 2019-01-16 | Stop reason: HOSPADM

## 2019-01-15 RX ORDER — ALBUTEROL SULFATE 0.83 MG/ML
2.5 SOLUTION RESPIRATORY (INHALATION) EVERY 4 HOURS PRN
Status: DISCONTINUED | OUTPATIENT
Start: 2019-01-15 | End: 2019-01-16 | Stop reason: HOSPADM

## 2019-01-15 RX ORDER — MEPERIDINE HYDROCHLORIDE 25 MG/ML
12.5 INJECTION INTRAMUSCULAR; INTRAVENOUS; SUBCUTANEOUS
Status: DISCONTINUED | OUTPATIENT
Start: 2019-01-15 | End: 2019-01-16 | Stop reason: HOSPADM

## 2019-01-15 RX ORDER — PROPOFOL 10 MG/ML
INJECTION, EMULSION INTRAVENOUS PRN
Status: DISCONTINUED | OUTPATIENT
Start: 2019-01-15 | End: 2019-01-15

## 2019-01-15 RX ORDER — GABAPENTIN 300 MG/1
300 CAPSULE ORAL ONCE
Status: COMPLETED | OUTPATIENT
Start: 2019-01-15 | End: 2019-01-15

## 2019-01-15 RX ORDER — HYDRALAZINE HYDROCHLORIDE 20 MG/ML
2.5-5 INJECTION INTRAMUSCULAR; INTRAVENOUS EVERY 10 MIN PRN
Status: DISCONTINUED | OUTPATIENT
Start: 2019-01-15 | End: 2019-01-16 | Stop reason: HOSPADM

## 2019-01-15 RX ORDER — HYDROMORPHONE HYDROCHLORIDE 1 MG/ML
.3-.5 INJECTION, SOLUTION INTRAMUSCULAR; INTRAVENOUS; SUBCUTANEOUS EVERY 10 MIN PRN
Status: DISCONTINUED | OUTPATIENT
Start: 2019-01-15 | End: 2019-01-16 | Stop reason: HOSPADM

## 2019-01-15 RX ORDER — METOPROLOL TARTRATE 1 MG/ML
1-2 INJECTION, SOLUTION INTRAVENOUS EVERY 5 MIN PRN
Status: DISCONTINUED | OUTPATIENT
Start: 2019-01-15 | End: 2019-01-16 | Stop reason: HOSPADM

## 2019-01-15 RX ADMIN — PROPOFOL 50 MG: 10 INJECTION, EMULSION INTRAVENOUS at 12:12

## 2019-01-15 RX ADMIN — KETOROLAC TROMETHAMINE 30 MG: 30 INJECTION, SOLUTION INTRAMUSCULAR; INTRAVENOUS at 12:16

## 2019-01-15 RX ADMIN — FENTANYL CITRATE 25 MCG: 50 INJECTION, SOLUTION INTRAMUSCULAR; INTRAVENOUS at 12:21

## 2019-01-15 RX ADMIN — Medication 2 ML: at 12:25

## 2019-01-15 RX ADMIN — PROPOFOL 100 MCG/KG/MIN: 10 INJECTION, EMULSION INTRAVENOUS at 12:12

## 2019-01-15 RX ADMIN — GABAPENTIN 300 MG: 300 CAPSULE ORAL at 11:29

## 2019-01-15 RX ADMIN — SODIUM CHLORIDE, SODIUM LACTATE, POTASSIUM CHLORIDE, CALCIUM CHLORIDE: 600; 310; 30; 20 INJECTION, SOLUTION INTRAVENOUS at 12:10

## 2019-01-15 RX ADMIN — IODINE AND POTASSIUM IODIDE 1 ML: 50; 100 LIQUID ORAL at 12:24

## 2019-01-15 RX ADMIN — ACETAMINOPHEN 975 MG: 325 TABLET ORAL at 11:29

## 2019-01-15 RX ADMIN — DEXAMETHASONE SODIUM PHOSPHATE 4 MG: 4 INJECTION, SOLUTION INTRA-ARTICULAR; INTRALESIONAL; INTRAMUSCULAR; INTRAVENOUS; SOFT TISSUE at 12:12

## 2019-01-15 RX ADMIN — SODIUM CHLORIDE, SODIUM LACTATE, POTASSIUM CHLORIDE, CALCIUM CHLORIDE: 600; 310; 30; 20 INJECTION, SOLUTION INTRAVENOUS at 11:35

## 2019-01-15 RX ADMIN — LIDOCAINE HYDROCHLORIDE AND EPINEPHRINE 8 ML: 10; 10 INJECTION, SOLUTION INFILTRATION; PERINEURAL at 12:24

## 2019-01-15 RX ADMIN — LIDOCAINE HYDROCHLORIDE 40 MG: 20 INJECTION, SOLUTION INFILTRATION; PERINEURAL at 12:12

## 2019-01-15 RX ADMIN — ONDANSETRON 4 MG: 2 INJECTION INTRAMUSCULAR; INTRAVENOUS at 12:16

## 2019-01-15 RX ADMIN — FERRIC SUBSULFATE 1 ML: 0.21 LIQUID TOPICAL at 12:31

## 2019-01-15 NOTE — ANESTHESIA POSTPROCEDURE EVALUATION
Anesthesia POST Procedure Evaluation    Patient: Arpita Zafar   MRN:     [id number] Gender:   female   Age:    33 year old :      1985        Preoperative Diagnosis: CERVICAL DYSPLASIA   Procedure(s):  LOOP ELECTROSURGICAL EXCISION PROCEDURE WITH CONIZATION OF CERVIX   Postop Comments: No value filed.       Anesthesia Type:  MAC    Reportable Event: NO     PAIN: Uncomplicated   Sign Out status: Comfortable, Well controlled pain     PONV: No PONV   Sign Out status:  No Nausea or Vomiting     Neuro/Psych: Uneventful perioperative course   Sign Out Status: Preoperative baseline; Age appropriate mentation     Airway/Resp.: Uneventful perioperative course   Sign Out Status: Non labored breathing, age appropriate RR; Resp. Status within EXPECTED Parameters     CV: Uneventful perioperative course   Sign Out status: Appropriate BP and perfusion indices; Appropriate HR/Rhythm     Disposition:   Sign Out in:  PACU  Disposition:  Phase II; Home  Recovery Course: Uneventful  Follow-Up: Not required           Last Anesthesia Record Vitals:  CRNA VITALS  1/15/2019 1205 - 1/15/2019 1305      1/15/2019             Pulse:  89    SpO2:  96 %    Resp Rate (observed):  1  (Abnormal)           Last PACU/Preop Vitals:  Vitals:    01/15/19 1126 01/15/19 1238 01/15/19 1300   BP: 130/79 114/64 120/74   Resp: 16 14 16   Temp: 37  C (98.6  F)     SpO2: 97% 96% 99%         Electronically Signed By: Jacob Stover MD, January 15, 2019, 3:05 PM

## 2019-01-15 NOTE — ANESTHESIA PREPROCEDURE EVALUATION
Anesthesia Pre-Procedure Evaluation    Patient: Arpita Zafar   MRN:     [id number] Gender:   female   Age:    33 year old :      1985        Preoperative Diagnosis: CERVICAL DYSPLASIA   Procedure(s):  LOOP ELECTROSURGICAL EXCISION PROCEDURE WITH CONIZATION OF CERVIX     Past Medical History:   Diagnosis Date     Abnormal Pap smear of cervix 10/31/2018    See problem list     Cervical high risk HPV (human papillomavirus) test positive 2017, 10/31/18    See problem list      Past Surgical History:   Procedure Laterality Date     HC COLP CERVIX/UPPER VAGINA W ENDOCERV CURETT  2018     US BREAST BIOPSY CORE NEEDLE LEFT  2018          Anesthesia Evaluation     .             ROS/MED HX    ENT/Pulmonary:  - neg pulmonary ROS     Neurologic:  - neg neurologic ROS     Cardiovascular:  - neg cardiovascular ROS       METS/Exercise Tolerance:     Hematologic:  - neg hematologic  ROS       Musculoskeletal:  - neg musculoskeletal ROS       GI/Hepatic:  - neg GI/hepatic ROS       Renal/Genitourinary:  - ROS Renal section negative       Endo:  - neg endo ROS       Psychiatric:  - neg psychiatric ROS       Infectious Disease:  - neg infectious disease ROS       Malignancy:      - no malignancy   Other:    - neg other ROS                     PHYSICAL EXAM:   Mental Status/Neuro: A/A/O   Airway: Facies: Feasible  Mallampati: I  Mouth/Opening: Full  TM distance: > 6 cm  Neck ROM: Full   Respiratory: Auscultation: CTAB     Resp. Rate: Normal     Resp. Effort: Normal      CV: Rhythm: Regular  Rate: Age appropriate  Heart: Normal Sounds   Comments:      Dental: Normal                  Lab Results   Component Value Date    WBC 5.5 2017    HGB 14.9 2017    HCT 43.5 2017     2017     2017    POTASSIUM 3.9 2017    CHLORIDE 103 2017    CO2 27 2017    BUN 13 2017    CR 0.83 2017    GLC 91 2017    MARY 8.9 2017    ALBUMIN 4.0 2017     "PROTTOTAL 7.4 08/16/2017    ALT 22 08/16/2017    AST 9 08/16/2017    ALKPHOS 58 08/16/2017    BILITOTAL 0.6 08/16/2017    TSH 1.11 08/16/2017       Preop Vitals  BP Readings from Last 3 Encounters:   01/15/19 120/74   01/04/19 128/85   11/15/18 146/88    Pulse Readings from Last 3 Encounters:   01/04/19 80   11/15/18 94   10/31/18 83      Resp Readings from Last 3 Encounters:   01/15/19 16   01/04/19 18    SpO2 Readings from Last 3 Encounters:   01/15/19 99%   01/04/19 97%   04/17/18 97%      Temp Readings from Last 1 Encounters:   01/15/19 37  C (98.6  F) (Temporal)    Ht Readings from Last 1 Encounters:   01/04/19 1.664 m (5' 5.5\")      Wt Readings from Last 1 Encounters:   01/04/19 72.8 kg (160 lb 6.4 oz)    Estimated body mass index is 26.29 kg/m  as calculated from the following:    Height as of 1/4/19: 1.664 m (5' 5.5\").    Weight as of 1/4/19: 72.8 kg (160 lb 6.4 oz).     LDA:            Assessment:   ASA SCORE: 1    NPO Status: > 6 hours since completed Solid Foods   Documentation: H&P complete; Preop Testing complete; Consents complete   Proceeding: Proceed without further delay  Tobacco Use:  NO Active use of Tobacco/UNKNOWN Tobacco use status     Plan:   Anes. Type:  MAC   Pre-Induction: Midazolam IV   Induction:  IV (Standard)   Airway: Native Airway   Access/Monitoring: PIV   Maintenance: Propofol; IV   Emergence: Procedure Site   Logistics: Same Day Surgery     Postop Pain/Sedation Strategy:  Standard-Options: Opioids PRN     PONV Management:  Adult Risk Factors: Female, Non-Smoker, Postop Opioids  Prevention: Propofol Infusion; Ondansetron     CONSENT: Direct conversation   Plan and risks discussed with: Patient   Blood Products: Consent Deferred (Minimal Blood Loss)                         Jacob Stover MD  "

## 2019-01-15 NOTE — OP NOTE
Arpita Zafar  1985  Preoperative diagnosis: Cervical dysplasia     Postoperative diagnosis: Same, final Path pending    Date of Procedure: January 15, 2019    Operation: Loop electrosurgical excision procedure with conization of the cervix    Surgeon: Corey Blakely MD    Anesthesia: IV sedation MAC, Local 1% lidocaine with epinephrine    Specimens: cervical transformation zone (cervical conization)    History and operative indications: Please see history and physical and progress notes for details of those findings. I discussed the operation, indications, goals, risks, complications, alternatives and anticipated postoperative course including success/failure rates, limitations and consequences. Patient understood and desired to proceed.     Operative procedure and findings: After appropriate preparation, the ectocervix appeared normal consistent with the colposcopic findings and 8 mL of local 1% lidocaine with epinephrine was injected at the cervix.  Using the wire loop and a blended current, the excision was performed with the specimen as labeled removing the transformation zone and anticipated lesion with satisfactory margins and depth. This was a small cylinder conization to remove 1.5-2 cm of the distal endocervix. Light fulguration was performed with the ball tip electrode and Monsel s paste applied.  No complications.  EBL= 10 mL. Patient tolerated the procedure well. Instructions given.    Corey Blakely MD

## 2019-01-15 NOTE — DISCHARGE INSTRUCTIONS
[facility]  Same-Day Surgery   Adult Discharge Orders & Instructions   For 24 hours after surgery  1. Get plenty of rest.  A responsible adult must stay with you for at least 24 hours after you leave the hospital.   2. Do not drive or use heavy equipment.  If you have weakness or tingling, don't drive or use heavy equipment until this feeling goes away.  3. Do not drink alcohol.  4. Avoid strenuous or risky activities.  Ask for help when climbing stairs.   5. You may feel lightheaded.  IF so, sit for a few minutes before standing.  Have someone help you get up.   6. If you have nausea (feel sick to your stomach): Drink only clear liquids such as apple juice, ginger ale, broth or 7-Up.  Rest may also help.  Be sure to drink enough fluids.  Move to a regular diet as you feel able.  7. You may have a slight fever. Call the doctor if your fever is over 100 F (37.7 C) (taken under the tongue) or lasts longer than 24 hours.  8. You may have a dry mouth, a sore throat, muscle aches or trouble sleeping.  These should go away after 24 hours.  9. Do not make important or legal decisions.   Call your doctor for any of the followin.  Signs of infection (fever, growing tenderness at the surgery site, a large amount of drainage or bleeding, severe pain, foul-smelling drainage, redness, swelling).    2. It has been over 8 to 10 hours since surgery and you are still not able to urinate (pass water).    3.  Headache for over 24 hours.    To contact a doctor, call ____[phone]_______________________       Tylenol 975 mg given at 11:30. Do not repeat until after 3:30 pm.  Toradol 30 mg IV given at 12:15. Do not repeat with Ibuprofen until after 6:15 pm.

## 2019-01-15 NOTE — ANESTHESIA CARE TRANSFER NOTE
Patient: Arpita Zafar    Procedure(s):  LOOP ELECTROSURGICAL EXCISION PROCEDURE WITH CONIZATION OF CERVIX    Diagnosis: CERVICAL DYSPLASIA  Diagnosis Additional Information: No value filed.    Anesthesia Type:   MAC     Note:  Airway :Room Air  Patient transferred to:Phase II  Comments: Awake, comfortable, sats 99%< Report to RN.Handoff Report: Identifed the Patient, Identified the Reponsible Provider, Reviewed the pertinent medical history, Discussed the surgical course, Reviewed Intra-OP anesthesia mangement and issues during anesthesia, Set expectations for post-procedure period and Allowed opportunity for questions and acknowledgement of understanding      Vitals: (Last set prior to Anesthesia Care Transfer)    CRNA VITALS  1/15/2019 1205 - 1/15/2019 1238      1/15/2019             Pulse:  89    SpO2:  96 %    Resp Rate (observed):  1  (Abnormal)                 Electronically Signed By: SALAS Alexis CRNA  January 15, 2019  12:38 PM

## 2019-01-17 LAB — COPATH REPORT: NORMAL

## 2019-01-20 PROBLEM — R87.611 ATYPICAL SQUAMOUS CELLS CANNOT EXCLUDE HIGH GRADE SQUAMOUS INTRAEPITHELIAL LESION ON CYTOLOGIC SMEAR OF CERVIX (ASC-H): Status: ACTIVE | Noted: 2018-11-06

## 2019-02-27 ENCOUNTER — OFFICE VISIT (OUTPATIENT)
Dept: OBGYN | Facility: CLINIC | Age: 34
End: 2019-02-27
Payer: COMMERCIAL

## 2019-02-27 VITALS — SYSTOLIC BLOOD PRESSURE: 147 MMHG | TEMPERATURE: 97.4 F | HEART RATE: 87 BPM | DIASTOLIC BLOOD PRESSURE: 84 MMHG

## 2019-02-27 DIAGNOSIS — Z09 POSTOPERATIVE EXAMINATION: Primary | ICD-10-CM

## 2019-02-27 PROCEDURE — 99024 POSTOP FOLLOW-UP VISIT: CPT | Performed by: OBSTETRICS & GYNECOLOGY

## 2019-02-27 NOTE — PATIENT INSTRUCTIONS
If you have any questions regarding your visit, Please contact your care team.     [email or web address]: [phone]    [facility] CLINIC HOURS TELEPHONE NUMBER       SHANIKA Cobos-    Ora Nguyen-Medical Assistant       Monday-[place]  8:00a.m-4:45 p.m  Tuesday-[place]  9:00a.m-11:45 a.m  Wednesday-[place] 8:00a.m-4:45 p.m.  Thursday-[place]  8:00a.m-4:45 p.m.  Friday-[place]  8:00a.m-4:45 p.m. [facility]  [address] [place] [address]  [phone] ask [facility]  [phone] Fax  Imaging [phone]    [facility]  [address] [place] [address] [place] [address]  [phone]    [facility]  [address] [place]  [place] [address]  [phone] ask [facility]  [phone] Fax  Imaging [phone]     Urgent Care locations:    [facility] Monday-Friday  5 pm - 9 pm  Saturday and Sunday   9 am - 5 pm    Monday-Friday   11 am - 9 pm  Saturday and Sunday   9 am - 5 pm   [phone]       If you need a medication refill, please contact your pharmacy. Please allow 3 business days for your refill to be completed.  As always, Thank you for trusting us with your healthcare needs!

## 2019-02-28 NOTE — PROGRESS NOTES
Arpita Zafar is a 34 year old year old who is here today for a postoperative exam.      Doing well after recent surgery- loop electrosurgical excision procedure conization for ESTELA 2 with clear margins.  No signif signs, symptoms or concerns. Here with mother.     Had a normal menses. Plans probable pregnancy in the fall.      Past medical, obstetrical, surgical, family and social history reviewed and as noted or updated in chart.      Exam:/84 (BP Location: Left arm, Patient Position: Chair, Cuff Size: Adult Regular)   Pulse 87   Temp 97.4  F (36.3  C) (Oral)   LMP 01/20/2019   Breastfeeding? No   Pelvis was negative.  Cervix healed, patent and non-tender.     A/P: Satisfactory postop exam. Check follow-up Pap/HRHPV at 1 and 2 yrs. Reviewed operation and findings. Surgically dismissed. Instructions reviewed. See orders. Continue other general medical care.    Corey Blakely MD

## 2019-08-09 ENCOUNTER — MYC MEDICAL ADVICE (OUTPATIENT)
Dept: FAMILY MEDICINE | Facility: CLINIC | Age: 34
End: 2019-08-09

## 2019-08-09 NOTE — TELEPHONE ENCOUNTER
Unable to determine recommendation from chart.   Routing to provider to review and advise.   Char Gray RN

## 2020-01-22 ENCOUNTER — OFFICE VISIT (OUTPATIENT)
Dept: URGENT CARE | Facility: URGENT CARE | Age: 35
End: 2020-01-22
Payer: COMMERCIAL

## 2020-01-22 VITALS
BODY MASS INDEX: 26.88 KG/M2 | WEIGHT: 164 LBS | HEART RATE: 110 BPM | OXYGEN SATURATION: 97 % | DIASTOLIC BLOOD PRESSURE: 88 MMHG | RESPIRATION RATE: 18 BRPM | SYSTOLIC BLOOD PRESSURE: 119 MMHG | TEMPERATURE: 99 F

## 2020-01-22 DIAGNOSIS — B34.9 VIRAL SYNDROME: Primary | ICD-10-CM

## 2020-01-22 LAB
DEPRECATED S PYO AG THROAT QL EIA: NORMAL
FLUAV+FLUBV AG SPEC QL: NEGATIVE
FLUAV+FLUBV AG SPEC QL: NEGATIVE
SPECIMEN SOURCE: NORMAL
SPECIMEN SOURCE: NORMAL

## 2020-01-22 PROCEDURE — 87880 STREP A ASSAY W/OPTIC: CPT | Performed by: FAMILY MEDICINE

## 2020-01-22 PROCEDURE — 87081 CULTURE SCREEN ONLY: CPT | Performed by: FAMILY MEDICINE

## 2020-01-22 PROCEDURE — 87804 INFLUENZA ASSAY W/OPTIC: CPT | Performed by: FAMILY MEDICINE

## 2020-01-22 PROCEDURE — 99213 OFFICE O/P EST LOW 20 MIN: CPT | Performed by: FAMILY MEDICINE

## 2020-01-22 ASSESSMENT — ENCOUNTER SYMPTOMS
EYE PAIN: 0
DYSURIA: 0
VOMITING: 0
ABDOMINAL PAIN: 0
PALPITATIONS: 0
SHORTNESS OF BREATH: 0
SORE THROAT: 0
CHILLS: 0
SEIZURES: 0
ARTHRALGIAS: 0
COLOR CHANGE: 0
FEVER: 0
COUGH: 0
HEMATURIA: 0
BACK PAIN: 0

## 2020-01-22 ASSESSMENT — PAIN SCALES - GENERAL: PAINLEVEL: MODERATE PAIN (4)

## 2020-01-23 LAB
BACTERIA SPEC CULT: NORMAL
SPECIMEN SOURCE: NORMAL

## 2020-01-23 NOTE — PROGRESS NOTES
SUBJECTIVE:   Arpita Zafar is a 34 year old female presenting with a chief complaint of   Chief Complaint   Patient presents with     Flu     x1 day fever, body ache, bloating       She is an established patient of [facility].    One day of fever, body aches and bloating        Review of Systems   Constitutional: Negative for chills and fever.   HENT: Negative for ear pain and sore throat.    Eyes: Negative for pain and visual disturbance.   Respiratory: Negative for cough and shortness of breath.    Cardiovascular: Negative for chest pain and palpitations.   Gastrointestinal: Negative for abdominal pain and vomiting.   Genitourinary: Negative for dysuria and hematuria.   Musculoskeletal: Negative for arthralgias and back pain.   Skin: Negative for color change and rash.   Neurological: Negative for seizures and syncope. Tremors:      All other systems reviewed and are negative.    Patient Active Problem List   Diagnosis     Cervical high risk HPV (human papillomavirus) test positive     Atypical squamous cells cannot exclude high grade squamous intraepithelial lesion on cytologic smear of cervix (ASC-H)      Past Medical History:   Diagnosis Date     Abnormal Pap smear of cervix 10/31/2018    See problem list     Cervical high risk HPV (human papillomavirus) test positive 2017, 10/31/18    See problem list     Family History   Problem Relation Age of Onset     Diabetes Father      Malignant Hyperthermia No family hx of      No current outpatient medications on file.     Social History     Tobacco Use     Smoking status: Former Smoker     Types: Cigarettes     Last attempt to quit: 10/31/2015     Years since quittin.2     Smokeless tobacco: Never Used   Substance Use Topics     Alcohol use: Yes       OBJECTIVE  /88   Pulse 110   Temp 99  F (37.2  C) (Oral)   Resp 18   Wt 74.4 kg (164 lb)   LMP 2020 (Approximate)   SpO2 97%   BMI 26.88 kg/m      Physical Exam  HENT:      Head:  Normocephalic and atraumatic.      Right Ear: External ear normal.      Left Ear: External ear normal.      Nose: Nose normal.      Mouth/Throat:      Pharynx: No oropharyngeal exudate.   Eyes:      General: No scleral icterus.        Right eye: No discharge.         Left eye: No discharge.      Conjunctiva/sclera: Conjunctivae normal.      Pupils: Pupils are equal, round, and reactive to light.   Neck:      Musculoskeletal: Neck supple.      Thyroid: No thyromegaly.      Trachea: No tracheal deviation.   Cardiovascular:      Rate and Rhythm: Normal rate and regular rhythm.      Heart sounds: Normal heart sounds. No murmur. No friction rub. No gallop.    Pulmonary:      Effort: Pulmonary effort is normal. No respiratory distress.      Breath sounds: Normal breath sounds. No stridor. No wheezing or rales.   Chest:      Chest wall: No tenderness.   Abdominal:      Palpations: Abdomen is soft.   Musculoskeletal:         General: No tenderness or deformity.   Lymphadenopathy:      Cervical: No cervical adenopathy.   Skin:     General: Skin is warm and dry.      Findings: No erythema or rash.   Neurological:      Mental Status: She is alert and oriented to person, place, and time.      Cranial Nerves: No cranial nerve deficit.   Psychiatric:         Judgment: Judgment normal.       Labs:  Results for orders placed or performed in visit on 01/22/20 (from the past 24 hour(s))   Rapid strep screen   Result Value Ref Range    Specimen Description Throat     Rapid Strep A Screen       NEGATIVE: No Group A streptococcal antigen detected by immunoassay, await culture report.   Influenza A/B antigen   Result Value Ref Range    Influenza A/B Agn Specimen Nasal     Influenza A Negative NEG^Negative    Influenza B Negative NEG^Negative         ASSESSMENT:    ICD-10-CM    1. Fever R50.9 Rapid strep screen     Influenza A/B antigen   2. Viral syndrome B34.9           PLAN  Rest, fluids and nutrition emphasized.   Follow-up if  symptoms persist or worsen.    Patient educational/instructional material provided including reasons for follow-up   The patient indicates understanding of these issues and agrees with the plan.   Rayshawn Jefferson MD

## 2020-01-28 ENCOUNTER — OFFICE VISIT (OUTPATIENT)
Dept: FAMILY MEDICINE | Facility: CLINIC | Age: 35
End: 2020-01-28
Payer: COMMERCIAL

## 2020-01-28 VITALS
HEART RATE: 71 BPM | SYSTOLIC BLOOD PRESSURE: 140 MMHG | DIASTOLIC BLOOD PRESSURE: 97 MMHG | HEIGHT: 66 IN | TEMPERATURE: 98.2 F | WEIGHT: 161 LBS | BODY MASS INDEX: 25.88 KG/M2 | OXYGEN SATURATION: 98 %

## 2020-01-28 DIAGNOSIS — N30.00 ACUTE CYSTITIS WITHOUT HEMATURIA: Primary | ICD-10-CM

## 2020-01-28 DIAGNOSIS — R82.90 NONSPECIFIC FINDING ON EXAMINATION OF URINE: ICD-10-CM

## 2020-01-28 DIAGNOSIS — R03.0 ELEVATED BLOOD PRESSURE READING WITHOUT DIAGNOSIS OF HYPERTENSION: ICD-10-CM

## 2020-01-28 LAB
ALBUMIN UR-MCNC: 30 MG/DL
APPEARANCE UR: ABNORMAL
BACTERIA #/AREA URNS HPF: ABNORMAL /HPF
BILIRUB UR QL STRIP: NEGATIVE
COLOR UR AUTO: ABNORMAL
GLUCOSE UR STRIP-MCNC: NEGATIVE MG/DL
HCG UR QL: NEGATIVE
HGB UR QL STRIP: ABNORMAL
KETONES UR STRIP-MCNC: NEGATIVE MG/DL
LEUKOCYTE ESTERASE UR QL STRIP: ABNORMAL
NITRATE UR QL: NEGATIVE
NON-SQ EPI CELLS #/AREA URNS LPF: ABNORMAL /LPF
PH UR STRIP: 6 PH (ref 5–7)
RBC #/AREA URNS AUTO: >100 /HPF
SOURCE: ABNORMAL
SP GR UR STRIP: 1.02 (ref 1–1.03)
UROBILINOGEN UR STRIP-ACNC: 0.2 EU/DL (ref 0.2–1)
WBC #/AREA URNS AUTO: ABNORMAL /HPF

## 2020-01-28 PROCEDURE — 87186 SC STD MICRODIL/AGAR DIL: CPT | Performed by: NURSE PRACTITIONER

## 2020-01-28 PROCEDURE — 81001 URINALYSIS AUTO W/SCOPE: CPT | Performed by: NURSE PRACTITIONER

## 2020-01-28 PROCEDURE — 87086 URINE CULTURE/COLONY COUNT: CPT | Performed by: NURSE PRACTITIONER

## 2020-01-28 PROCEDURE — 99213 OFFICE O/P EST LOW 20 MIN: CPT | Performed by: NURSE PRACTITIONER

## 2020-01-28 PROCEDURE — 87088 URINE BACTERIA CULTURE: CPT | Performed by: NURSE PRACTITIONER

## 2020-01-28 PROCEDURE — 81025 URINE PREGNANCY TEST: CPT | Performed by: NURSE PRACTITIONER

## 2020-01-28 RX ORDER — NITROFURANTOIN 25; 75 MG/1; MG/1
100 CAPSULE ORAL 2 TIMES DAILY
Qty: 10 CAPSULE | Refills: 0 | Status: SHIPPED | OUTPATIENT
Start: 2020-01-28 | End: 2020-03-05

## 2020-01-28 ASSESSMENT — PAIN SCALES - GENERAL: PAINLEVEL: MILD PAIN (2)

## 2020-01-28 ASSESSMENT — MIFFLIN-ST. JEOR: SCORE: 1439.1

## 2020-01-28 NOTE — PROGRESS NOTES
Subjective     Arpita Zafar is a 35 year old female who presents to clinic today for the following health issues:    HPI   URINARY TRACT SYMPTOMS  Onset: Today    Description:   Painful urination (Dysuria): YES           Frequency: YES  Blood in urine (Hematuria): YES  Delay in urine (Hesitency): YES    Intensity: severe    Progression of Symptoms:  worsening    Accompanying Signs & Symptoms:  Fever/chills: no   Flank pain no   Nausea and vomiting: no   Any vaginal symptoms: none  Abdominal/Pelvic Pain: YES    History:   History of frequent UTI's: YES  History of kidney stones: no   Sexually Active: YES  Possibility of pregnancy: Yes    Precipitating factors:   none    Therapies Tried and outcome: none    No back or flank pain  No nausea or vomiting  Feels like previous UTIs    BP elevated. Denies chest pain, shortness of breath, LE edema, palpitations. Feels like it's elevated due to urinary discomfort.      Patient Active Problem List   Diagnosis     Cervical high risk HPV (human papillomavirus) test positive     Atypical squamous cells cannot exclude high grade squamous intraepithelial lesion on cytologic smear of cervix (ASC-H)     Past Surgical History:   Procedure Laterality Date     CONIZATION LEEP N/A 1/15/2019    Procedure: LOOP ELECTROSURGICAL EXCISION PROCEDURE WITH CONIZATION OF CERVIX;  Surgeon: Corey Blakely MD;  Location: [facility]      COLP CERVIX/UPPER VAGINA W ENDOCERV CURETT  2018     US BREAST BIOPSY CORE NEEDLE LEFT  2018       Social History     Tobacco Use     Smoking status: Former Smoker     Types: Cigarettes     Last attempt to quit: 10/31/2015     Years since quittin.2     Smokeless tobacco: Never Used   Substance Use Topics     Alcohol use: Yes     Family History   Problem Relation Age of Onset     Diabetes Father      Malignant Hyperthermia No family hx of          Current Outpatient Medications   Medication Sig Dispense Refill     nitroFURantoin macrocrystal-monohydrate  "(MACROBID) 100 MG capsule Take 1 capsule (100 mg) by mouth 2 times daily for 5 days 10 capsule 0     No Known Allergies      Reviewed and updated as needed this visit by Provider  Tobacco  Allergies  Meds  Problems  Med Hx  Surg Hx  Fam Hx         Review of Systems   ROS COMP: Constitutional, HEENT, cardiovascular, pulmonary, gi and gu systems are negative, except as otherwise noted.      Objective    BP (!) 140/97   Pulse 71   Temp 98.2  F (36.8  C) (Oral)   Ht 1.664 m (5' 5.5\")   Wt 73 kg (161 lb)   LMP 01/08/2020 (Approximate)   SpO2 98%   BMI 26.38 kg/m    Body mass index is 26.38 kg/m .  Physical Exam   GENERAL: healthy, alert and no distress  RESP: lungs clear to auscultation - no rales, rhonchi or wheezes  CV: regular rate and rhythm, normal S1 S2, no S3 or S4, no murmur, click or rub, no peripheral edema and peripheral pulses strong  ABDOMEN: soft, nontender, no hepatosplenomegaly, no masses and bowel sounds normal  MS: no gross musculoskeletal defects noted, no edema  BACK: no CVA tenderness, no paralumbar tenderness  PSYCH: mentation appears normal, affect normal/bright    Diagnostic Test Results:  Labs reviewed in Epic  Results for orders placed or performed in visit on 01/28/20 (from the past 24 hour(s))   *UA reflex to Microscopic and Culture ([place] and [facility] (except [place] and [place])   Result Value Ref Range    Color Urine Red     Appearance Urine Slightly Cloudy     Glucose Urine Negative NEG^Negative mg/dL    Bilirubin Urine Negative NEG^Negative    Ketones Urine Negative NEG^Negative mg/dL    Specific Gravity Urine 1.020 1.003 - 1.035    Blood Urine Large (A) NEG^Negative    pH Urine 6.0 5.0 - 7.0 pH    Protein Albumin Urine 30 (A) NEG^Negative mg/dL    Urobilinogen Urine 0.2 0.2 - 1.0 EU/dL    Nitrite Urine Negative NEG^Negative    Leukocyte Esterase Urine Large (A) NEG^Negative    Source Midstream Urine    Urine Microscopic   Result Value Ref Range    WBC Urine " "25-50 (A) OTO5^0 - 5 /HPF    RBC Urine >100 (A) OTO2^O - 2 /HPF    Squamous Epithelial /LPF Urine Few FEW^Few /LPF    Bacteria Urine Moderate (A) NEG^Negative /HPF   HCG Qual, Urine ([id number])   Result Value Ref Range    HCG Qual Urine Negative NEG^Negative           Assessment & Plan       ICD-10-CM    1. Acute cystitis without hematuria N30.00 *UA reflex to Microscopic and Culture ([place] and [facility] (except [place] and [place])     Urine Microscopic     HCG Qual, Urine ([id number])     nitroFURantoin macrocrystal-monohydrate (MACROBID) 100 MG capsule   2. Nonspecific finding on examination of urine R82.90 Urine Culture Aerobic Bacterial   3. Elevated blood pressure reading without diagnosis of hypertension R03.0    Urinalysis indicative of UTI, urine culture pending. We will call you if we need to switch antibiotics.  Start antibiotic today.  Drink plenty of water (64 oz daily).  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    Weight as of this encounter: 73 kg (161 lb).           See Patient Instructions    Return in about 3 days (around 1/31/2020), or if symptoms worsen or fail to improve.     The benefits, risks and potential side effects were discussed in detail. Black box warnings discussed as relevant. All patient questions were answered. The patient was instructed to follow up immediately if any adverse reactions develop.    Return precautions discussed, including when to seek urgent/emergent care.    Patient verbalizes understanding and agrees with plan of care. Patient stable for discharge.      SALAS Mckenna [facility]      "

## 2020-01-28 NOTE — PATIENT INSTRUCTIONS
"Urinalysis indicative of UTI, urine culture pending. We will call you if we need to switch antibiotics.  Start antibiotic today.  Drink plenty of water (64 oz daily).  Follow up with primary care provider at needed          Patient Education     Bladder Infection, Female (Adult)    Urine is normally doesn't have any bacteria in it. But bacteria can get into the urinary tract from the skin around the rectum. Or they can travel in the blood from elsewhere in the body. Once they are in your urinary tract, they can cause infection in the urethra (urethritis), the bladder (cystitis), or the kidneys (pyelonephritis).  The most common place for an infection is in the bladder. This is called a bladder infection. This is one of the most common infections in women. Most bladder infections are easily treated. They are not serious unless the infection spreads to the kidney.  The phrases \"bladder infection,\" \"UTI,\" and \"cystitis\" are often used to describe the same thing. But they are not always the same. Cystitis is an inflammation of the bladder. The most common cause of cystitis is an infection.  Symptoms  The infection causes inflammation in the urethra and bladder. This causes many of the symptoms. The most common symptoms of a bladder infection are:    Pain or burning when urinating    Having to urinate more often than usual    Urgent need to urinate    Only a small amount of urine comes out    Blood in urine    Abdominal discomfort. This is usually in the lower abdomen above the pubic bone.    Cloudy urine    Strong- or bad-smelling urine    Unable to urinate (urinary retention)    Unable to hold urine in (urinary incontinence)    Fever    Loss of appetite    Confusion (in older adults)  Causes  Bladder infections are not contagious. You can't get one from someone else, from a toilet seat, or from sharing a bath.  The most common cause of bladder infections is bacteria from the bowels. The bacteria get onto the skin " around the opening of the urethra. From there, they can get into the urine and travel up to the bladder, causing inflammation and infection. This usually happens because of:    Wiping improperly after urinating. Always wipe from front to back.    Bowel incontinence    Pregnancy    Procedures such as having a catheter inserted    Older age    Not emptying your bladder. This can allow bacteria a chance to grow in your urine.    Dehydration    Constipation    Sex    Use of a diaphragm for birth control   Treatment  Bladder infections are diagnosed by a urine test. They are treated with antibiotics and usually clear up quickly without complications. Treatment helps prevent a more serious kidney infection.  Medicines  Medicines can help in the treatment of a bladder infection:    Take antibiotics until they are used up, even if you feel better. It is important to finish them to make sure the infection has cleared.    You can use acetaminophen or ibuprofen for pain, fever, or discomfort, unless another medicine was prescribed. If you have chronic liver or kidney disease, talk with your healthcare provider before using these medicines. Also talk with your provider if you've ever had a stomach ulcer or gastrointestinal bleeding, or are taking blood-thinner medicines.    If you are given phenazopydridine to reduce burning with urination, it will cause your urine to become a bright orange color. This can stain clothing.  Care and prevention  These self-care steps can help prevent future infections:    Drink plenty of fluids to prevent dehydration and flush out your bladder. Do this unless you must restrict fluids for other health reasons, or your doctor told you not to.    Proper cleaning after going to the bathroom is important. Wipe from front to back after using the toilet to prevent the spread of bacteria.    Urinate more often. Don't try to hold urine in for a long time.    Wear loose-fitting clothes and cotton  underwear. Avoid tight-fitting pants.    Improve your diet and prevent constipation. Eat more fresh fruit and vegetables, and fiber, and less junk and fatty foods.    Avoid sex until your symptoms are gone.    Avoid caffeine, alcohol, and spicy foods. These can irritate your bladder.    Urinate right after intercourse to flush out your bladder.    If you use birth control pills and have frequent bladder infections, discuss it with your doctor.  Follow-up care  Call your healthcare provider if all symptoms are not gone after 3 days of treatment. This is especially important if you have repeat infections.  If a culture was done, you will be told if your treatment needs to be changed. If directed, you can call to find out the results.  If X-rays were done, you will be told if the results will affect your treatment.  Call 911  Call 911 if any of the following occur:    Trouble breathing    Hard to wake up or confusion    Fainting or loss of consciousness    Rapid heart rate  When to seek medical advice  Call your healthcare provider right away if any of these occur:    Fever of 100.4 F (38.0 C) or higher, or as directed by your healthcare provider    Symptoms are not better by the third day of treatment    Back or belly (abdominal) pain that gets worse    Repeated vomiting, or unable to keep medicine down    Weakness or dizziness    Vaginal discharge    Pain, redness, or swelling in the outer vaginal area (labia)  Date Last Reviewed: 10/1/2016    4941-8899 [employer] [address] [place] [address], [place] [address]. All rights reserved. This information is not intended as a substitute for professional medical care. Always follow your healthcare professional's instructions.

## 2020-01-30 LAB
BACTERIA SPEC CULT: ABNORMAL
SPECIMEN SOURCE: ABNORMAL

## 2020-01-31 ENCOUNTER — TELEPHONE (OUTPATIENT)
Dept: OBGYN | Facility: CLINIC | Age: 35
End: 2020-01-31

## 2020-01-31 NOTE — TELEPHONE ENCOUNTER
Pt is past due for Pap follow up  Reminder letter has been sent  LMTC her clinic with any questions or to schedule    Mago Danielson,   Pap Tracking

## 2020-03-05 ENCOUNTER — OFFICE VISIT (OUTPATIENT)
Dept: OBGYN | Facility: CLINIC | Age: 35
End: 2020-03-05
Payer: COMMERCIAL

## 2020-03-05 VITALS — DIASTOLIC BLOOD PRESSURE: 89 MMHG | OXYGEN SATURATION: 97 % | HEART RATE: 83 BPM | SYSTOLIC BLOOD PRESSURE: 130 MMHG

## 2020-03-05 DIAGNOSIS — Z98.890 S/P LEEP: ICD-10-CM

## 2020-03-05 DIAGNOSIS — Z01.42 PAP SMEAR OF CERVIX TO CONFIRM NORMAL SMEAR FOLLOWING ABNORMAL SMEAR: Primary | ICD-10-CM

## 2020-03-05 PROCEDURE — 88175 CYTOPATH C/V AUTO FLUID REDO: CPT | Performed by: OBSTETRICS & GYNECOLOGY

## 2020-03-05 PROCEDURE — 99213 OFFICE O/P EST LOW 20 MIN: CPT | Performed by: OBSTETRICS & GYNECOLOGY

## 2020-03-05 PROCEDURE — 87624 HPV HI-RISK TYP POOLED RSLT: CPT | Performed by: OBSTETRICS & GYNECOLOGY

## 2020-03-05 NOTE — LETTER
March 12, 2020    Arpita Zafar  [address] [facility] [place] [address]    Dear ,  This letter is regarding your recent Pap smear (cervical cancer screening) and Human Papillomavirus (HPV) test.  We are happy to inform you that your Pap smear result is normal. Cervical cancer is closely linked with certain types of HPV. Your results showed no evidence of high-risk HPV.  We recommend you have your next PAP smear and HPV test in 1 year.  You will still need to return to the clinic every year for an annual exam and other preventive tests.  If you have additional questions regarding this result, please call our registered nurse, Lashaun at [phone].  Sincerely,    Corey Blakely MD/jose francisco

## 2020-03-05 NOTE — PATIENT INSTRUCTIONS
If you have any questions regarding your visit, Please contact your care team.     [facility] [employer] [facility]: [phone]  [facility] Health CLINIC HOURS TELEPHONE NUMBER       Corey Blakely M.D.      Uzair Nguyen-Medical Assistant    Nara-  Laurie-     Monday-[place]  8:00a.m-4:45 p.m  Tuesday-[place]  9:00a.m-4:00 p.m  Wednesday-[place] 8:00a.m-4:45 p.m.  Thursday-[place]  8:00a.m-4:45 p.m.  Friday-[place]  8:00a.m-4:45 p.m. [facility]  [address]  [place], [place] [address] [phone] ask [facility]  [phone] Fax  Imaging [phone]    [facility]  [address]  [place], [place] [address] [phone]    [facility]  [address] [facility] [place] [address] [phone] [facility]  [phone] Fax  Imaging [phone]     Urgent Care locations:    [facility] Monday-Friday  5 pm - 9 pm  Saturday and Sunday   9 am - 5 pm  Monday-Friday   11 am - 9 pm  Saturday and Sunday   9 am - 5 pm   [phone]   If you need a medication refill, please contact your pharmacy. Please allow 3 business days for your refill to be completed.  As always, Thank you for trusting us with your healthcare needs!

## 2020-03-05 NOTE — PROGRESS NOTES
OB-GYN Problem-Oriented Visit or Consultation      Arpita Zafar is a 35 year old year old P0 who presents today for routine Pap smear after LEEP procedure on 1/15/19.  Referred by self.  Patient's last menstrual period was 02/19/2020 (exact date).    HPI:   Doing well.    Past medical, obstetrical, surgical, family and social history reviewed and as noted or updated in chart.  Menses q 28-30 days x 4-5 days.  Patient is currently not using birth control, and is not actively trying to get pregnant.  Patient is sexually active.    8/16/17 NIL pap, + HR HPV (not 16 or 18). Plan: cotest in 1 year.  10/31/18 ASC-H pap, + HR HPV (not 16 or 18). Plan colp.  11/15/18 [place]: ESTELA 2, Pap: ASC-H. Plan: LEEP  1/15/19 LEEP bx: ESTELA 2 with negative margins. Plan: review at f/u visit.   2/27/19 F/U visit: Plan: cotest at 12 and 24 mo.       Allergies, meds and supplements are as noted or updated in chart.      ROS:   Systems reviewed include:constitutional, genitourinary.    These systems were negative for significant symptoms except for the following additional: none; see HPI.    EXAM:  VS as noted. /89 (BP Location: Left arm, Patient Position: Chair, Cuff Size: Adult Regular)   Pulse 83   LMP 02/19/2020 (Exact Date)   SpO2 97%   Breastfeeding No   Constitutionally normal.       Pelvis was normal or negative except for, or in particular noting, the following  pertinent findings: uterus anterior, cervix well healed without lesions.      Assessment:   Encounter Diagnoses   Name Primary?     Pap smear of cervix to confirm normal smear following abnormal smear Yes     S/P LEEP      Plan and Recommendations:   Total encounter time (physician together with patient) = 20min. Direct counseling, education and care coordination time (physician together with patient) = 15min.   I reviewed the condition, causes, differential diagnosis, prognosis, evaluation and management considerations and options.  Questions answered and  information given. See orders. Pap/HRHPV sent. Follow-up per protocol. Pap/HRHPV in 1 yr.  Continue other gen med care.     A/P:  Arpita was seen today for gyn exam.    Diagnoses and all orders for this visit:    Pap smear of cervix to confirm normal smear following abnormal smear  -     Pap imaged thin layer diagnostic with HPV (select HPV order below)  -     HPV High Risk Types DNA Cervical    S/P LEEP  -     Pap imaged thin layer diagnostic with HPV (select HPV order below)  -     HPV High Risk Types DNA Cervical        Chay Field PA-S2  I was present with the student who participated in the service and in the documentation of the note. I have verified the history and personally performed the physical exam, medical decision making, and provided counseling in the time period documented. I agree with the assessment and plan of care as documented in the note.  Corey Blakely MD

## 2020-03-09 LAB
COPATH REPORT: NORMAL
PAP: NORMAL

## 2020-03-11 ENCOUNTER — HEALTH MAINTENANCE LETTER (OUTPATIENT)
Age: 35
End: 2020-03-11

## 2020-03-11 LAB
FINAL DIAGNOSIS: NORMAL
HPV HR 12 DNA CVX QL NAA+PROBE: NEGATIVE
HPV16 DNA SPEC QL NAA+PROBE: NEGATIVE
HPV18 DNA SPEC QL NAA+PROBE: NEGATIVE
SPECIMEN DESCRIPTION: NORMAL
SPECIMEN SOURCE CVX/VAG CYTO: NORMAL

## 2020-05-14 ENCOUNTER — APPOINTMENT (OUTPATIENT)
Dept: URGENT CARE | Facility: URGENT CARE | Age: 35
End: 2020-05-14
Payer: COMMERCIAL

## 2020-05-14 ENCOUNTER — RESULTS ONLY (OUTPATIENT)
Dept: LAB | Age: 35
End: 2020-05-14

## 2020-05-14 LAB
SARS-COV-2 RNA SPEC QL NAA+PROBE: NOT DETECTED
SPECIMEN SOURCE: NORMAL

## 2020-07-28 ENCOUNTER — ANCILLARY PROCEDURE (OUTPATIENT)
Dept: GENERAL RADIOLOGY | Facility: CLINIC | Age: 35
End: 2020-07-28
Attending: NURSE PRACTITIONER
Payer: OTHER MISCELLANEOUS

## 2020-07-28 ENCOUNTER — OFFICE VISIT (OUTPATIENT)
Dept: URGENT CARE | Facility: URGENT CARE | Age: 35
End: 2020-07-28
Payer: OTHER MISCELLANEOUS

## 2020-07-28 VITALS
BODY MASS INDEX: 26.09 KG/M2 | OXYGEN SATURATION: 97 % | HEART RATE: 67 BPM | WEIGHT: 159.2 LBS | DIASTOLIC BLOOD PRESSURE: 102 MMHG | SYSTOLIC BLOOD PRESSURE: 147 MMHG | TEMPERATURE: 98.9 F

## 2020-07-28 DIAGNOSIS — S49.92XA SHOULDER INJURY, LEFT, INITIAL ENCOUNTER: Primary | ICD-10-CM

## 2020-07-28 PROCEDURE — 73030 X-RAY EXAM OF SHOULDER: CPT | Mod: LT

## 2020-07-28 PROCEDURE — 99213 OFFICE O/P EST LOW 20 MIN: CPT | Performed by: NURSE PRACTITIONER

## 2020-07-28 ASSESSMENT — ENCOUNTER SYMPTOMS
NAUSEA: 0
FEVER: 0
SHORTNESS OF BREATH: 0
VOMITING: 0
SORE THROAT: 0
HEADACHES: 0
RHINORRHEA: 0
COUGH: 0
DIARRHEA: 0
ARTHRALGIAS: 1
CHILLS: 0

## 2020-07-28 ASSESSMENT — PAIN SCALES - GENERAL: PAINLEVEL: MILD PAIN (3)

## 2020-07-28 NOTE — LETTER
[address] [address]  Phone: [phone]    July 28, 2020        Arpita Zafar  [address] [facility] [place] [address]          To whom it may concern:    RE: Arpita Zafar    Patient was seen and treated today at our clinic and missed work. Please allow her to rest home 7/28/2020, 7/29/2020, 7/30/2020, 7/31/2020 and 8/1/2020.   Patient may return to work  with no restrictions    Please contact me for questions or concerns.      Sincerely,        Nell Carr  DNP, FNP-BC  Family Nurse Practitoner

## 2020-07-28 NOTE — PATIENT INSTRUCTIONS
Patient Education     Arthralgia    Arthralgia is the term for pain in or around the joint. It is a symptom, not a disease. This pain may involve one or more joints. In some cases, the pain moves from joint to joint.  There are many causes for joint pain. These include:    Injury    Osteoarthritis (wearing out of the joint surface)    Gout (inflammation of the joint due to crystals in the joint fluid)    Infection inside the joint      Bursitis (inflammation of the fluid-filled sacs around the joint)    Autoimmune disorders such as rheumatoid arthritis or lupus    Tendonitis (inflammation of chords that attach muscle to bone)  Home care    Rest the involved joint(s) until your symptoms improve.     You may be prescribed pain medicine. If none is prescribed, you may use acetaminophen or ibuprofen to control pain and inflammation.    Follow-up care  Follow up with your healthcare provider or as advised.  When to seek medical advice  Contact your healthcare provider right away if any of the following occurs:    Pain, swelling, or redness of joint increases    Pain worsens or recurs after a period of improvement    Pain moves to other joints    You cannot bear weight on the affected joint     You cannot move the affected joint    Joint appears deformed    New rash appears    Fever of 100.4 F (38 C) or higher, or as directed by your healthcare provider  Date Last Reviewed: 3/1/2017    6748-0371 [employer]. [address] [place] [address], [place] [address]. All rights reserved. This information is not intended as a substitute for professional medical care. Always follow your healthcare professional's instructions.

## 2020-07-28 NOTE — PROGRESS NOTES
SUBJECTIVE:   Arpita Zafar is a 35 year old female presenting with a chief complaint of   Chief Complaint   Patient presents with     Musculoskeletal Problem     Injured left shoulder at work on Friday 7/24/20- has been achy and sore since. Shooting pain with certain movements, uncomfortable to sleep       She is an established patient of [facility].    Left shoulder injury/Pain    Onset of symptoms was 4 day(s) ago.  Location: left shoulder  Context:       The injury happened while at work      Mechanism: twisting while trying to turn on a knob on the wall      Patient experienced immediate pain, delayed pain, was able to bear weight directly after injury  Course of symptoms is worsening.    Severity moderate  Current and Associated symptoms: Pain and Decreased range of motion  Denies  Swelling, Bruising and Warmth  Aggravating Factors: movement, repetitive motion and flexion/extension  Therapies to improve symptoms include: tylenol and elevation  This is the first time this type of problem has occurred for this patient.       Review of Systems   Constitutional: Negative for chills and fever.   HENT: Negative for congestion, ear pain, rhinorrhea and sore throat.    Respiratory: Negative for cough and shortness of breath.    Gastrointestinal: Negative for diarrhea, nausea and vomiting.   Musculoskeletal: Positive for arthralgias (left shoulder injury).   Neurological: Negative for headaches.   All other systems reviewed and are negative.      Past Medical History:   Diagnosis Date     Abnormal Pap smear of cervix 10/31/2018    See problem list     Cervical high risk HPV (human papillomavirus) test positive 8/16/2017, 10/31/18    See problem list     Family History   Problem Relation Age of Onset     Diabetes Father      Malignant Hyperthermia No family hx of      Current Outpatient Medications   Medication Sig Dispense Refill     acetaminophen-codeine (TYLENOL #3) 300-30 MG tablet Take 1 tablet by mouth every 6  hours as needed for moderate to severe pain 16 tablet 0     Social History     Tobacco Use     Smoking status: Former Smoker     Types: Cigarettes     Last attempt to quit: 10/31/2015     Years since quittin.7     Smokeless tobacco: Never Used   Substance Use Topics     Alcohol use: Yes       OBJECTIVE  BP (!) 147/102   Pulse 67   Temp 98.9  F (37.2  C) (Tympanic)   Wt 72.2 kg (159 lb 3.2 oz)   SpO2 97%   BMI 26.09 kg/m      Physical Exam  Vitals signs and nursing note reviewed.   Constitutional:       General: She is not in acute distress.     Appearance: She is well-developed. She is not diaphoretic.   HENT:      Head: Normocephalic and atraumatic.      Right Ear: Tympanic membrane and external ear normal.      Left Ear: Tympanic membrane and external ear normal.   Eyes:      Pupils: Pupils are equal, round, and reactive to light.   Neck:      Musculoskeletal: Normal range of motion and neck supple.   Pulmonary:      Effort: Pulmonary effort is normal. No respiratory distress.      Breath sounds: Normal breath sounds.   Musculoskeletal:      Comments: Tenderness on the anterior and posterior left shoulder, reduced ROM, Tenderness worse with upward reaching. Neurovascular exam is intact.   Lymphadenopathy:      Cervical: No cervical adenopathy.   Skin:     General: Skin is warm and dry.   Neurological:      Mental Status: She is alert.      Cranial Nerves: No cranial nerve deficit.       Results for orders placed or performed in visit on 20   XR Shoulder Left G/E 3 Views     Status: None    Narrative    XR SHOULDER LT G/E 3 VW 2020 11:29 AM     HISTORY: twisted shoulder 4 days ago.; Shoulder injury, left, initial  encounter      Impression    IMPRESSION: Humeral head superior sclerosis and mild subchondral  cortical flattening which could represent advanced osseous necrosis.  Suspected inferomedial humeral head osteophytic spurring, not well  profiled due to internal rotation. Small  calcification medial to the  humerus proximal metaphysis of indeterminate etiology or significance.  The above findings would be better evaluated with MR if clinically  warranted.    KELLY HERNANDEZ MD         ASSESSMENT:      ICD-10-CM    1. Shoulder injury, left, initial encounter  S49.92XA XR Shoulder Left G/E 3 Views     Orthopedic & Spine  Referral     acetaminophen-codeine (TYLENOL #3) 300-30 MG tablet     CANCELED: XR Shoulder Left 2 Views        PLAN:  I have discussed xray results with patient. Due to the findings,of humerus head superior sclerosis and mild subchondral  flattening,  I strongly advised orthopedic evaluation.  Meanwhile pain medication is ordered.  All questions are answered, patient will be called by the scheduling to see an orthopedic.      Patient Instructions     Patient Education     Arthralgia    Arthralgia is the term for pain in or around the joint. It is a symptom, not a disease. This pain may involve one or more joints. In some cases, the pain moves from joint to joint.  There are many causes for joint pain. These include:    Injury    Osteoarthritis (wearing out of the joint surface)    Gout (inflammation of the joint due to crystals in the joint fluid)    Infection inside the joint      Bursitis (inflammation of the fluid-filled sacs around the joint)    Autoimmune disorders such as rheumatoid arthritis or lupus    Tendonitis (inflammation of chords that attach muscle to bone)  Home care    Rest the involved joint(s) until your symptoms improve.     You may be prescribed pain medicine. If none is prescribed, you may use acetaminophen or ibuprofen to control pain and inflammation.    Follow-up care  Follow up with your healthcare provider or as advised.  When to seek medical advice  Contact your healthcare provider right away if any of the following occurs:    Pain, swelling, or redness of joint increases    Pain worsens or recurs after a period of improvement    Pain moves  to other joints    You cannot bear weight on the affected joint     You cannot move the affected joint    Joint appears deformed    New rash appears    Fever of 100.4 F (38 C) or higher, or as directed by your healthcare provider  Date Last Reviewed: 3/1/2017    2310-9620 [employer]. [address] [place] [address]. All rights reserved. This information is not intended as a substitute for professional medical care. Always follow your healthcare professional's instructions.

## 2020-07-29 ENCOUNTER — ANCILLARY PROCEDURE (OUTPATIENT)
Dept: GENERAL RADIOLOGY | Facility: CLINIC | Age: 35
End: 2020-07-29
Attending: FAMILY MEDICINE
Payer: COMMERCIAL

## 2020-07-29 ENCOUNTER — OFFICE VISIT (OUTPATIENT)
Dept: ORTHOPEDICS | Facility: CLINIC | Age: 35
End: 2020-07-29
Payer: COMMERCIAL

## 2020-07-29 VITALS
DIASTOLIC BLOOD PRESSURE: 76 MMHG | WEIGHT: 159 LBS | SYSTOLIC BLOOD PRESSURE: 128 MMHG | HEIGHT: 66 IN | BODY MASS INDEX: 25.55 KG/M2

## 2020-07-29 DIAGNOSIS — S49.90XD SHOULDER INJURY, SUBSEQUENT ENCOUNTER: ICD-10-CM

## 2020-07-29 DIAGNOSIS — R93.6 ABNORMAL X-RAY OF SHOULDER: Primary | ICD-10-CM

## 2020-07-29 PROCEDURE — 73030 X-RAY EXAM OF SHOULDER: CPT | Mod: LT

## 2020-07-29 PROCEDURE — 99204 OFFICE O/P NEW MOD 45 MIN: CPT | Performed by: FAMILY MEDICINE

## 2020-07-29 ASSESSMENT — MIFFLIN-ST. JEOR: SCORE: 1430.03

## 2020-07-29 NOTE — PROGRESS NOTES
"Arpita Zafar  :  1985  DOS: 2020  MRN: [id number]    Sports Medicine Clinic Visit    PCP: Cyrus, Micah Busch    Arpita Zafar is a 35 year old Right hand dominant female who is seen in consultation at the request of  Nell Carr C.N.P. presenting with acute left shoulder pain.    Injury: At work - reaching to [email or web address], noted sharp pain in left shoulder ~ 5 days ago (20).  Pain located over left deep anterior, posterior, lateral shoulder, radiating to left anterior chest wall & lateral upper arm.  Additional Features:  Positive: weakness and shooting pain.  Symptoms are better with Tylenol and Rest.  Symptoms are worse with: lying on left shoulder/supine, reaching, lifting, shoulder flexion/abduction.  Other evaluation and/or treatments so far consists of: Ice, Tylenol, Rest and UC visit.  Recent imaging completed: X-rays completed 20.  Prior History of related problems: none    Social History: works as RN at [employer]    Review of Systems  Musculoskeletal: as above  Remainder of review of systems is negative including constitutional, CV, pulmonary, GI, Skin and Neurologic except as noted in HPI or medical history.    Past Medical History:   Diagnosis Date     Abnormal Pap smear of cervix 10/31/2018    See problem list     Cervical high risk HPV (human papillomavirus) test positive 2017, 10/31/18    See problem list     Past Surgical History:   Procedure Laterality Date     CONIZATION LEEP N/A 1/15/2019    Procedure: LOOP ELECTROSURGICAL EXCISION PROCEDURE WITH CONIZATION OF CERVIX;  Surgeon: Corey Blakely MD;  Location:  OR      COLP CERVIX/UPPER VAGINA W ENDOCERV CURETT  2018      BREAST BIOPSY CORE NEEDLE LEFT  2018     Family History   Problem Relation Age of Onset     Diabetes Father      Malignant Hyperthermia No family hx of        Objective  /76   Ht 1.664 m (5' 5.5\")   Wt 72.1 kg (159 lb)   BMI 26.06 kg/m        General: " healthy, alert and in no distress      HEENT: no scleral icterus or conjunctival erythema     Skin: no suspicious lesions or rash. No jaundice.     CV: regular rhythm by palpation, 2+ distal pulses, no pedal edema      Resp: normal respiratory effort without conversational dyspnea     Psych: normal mood and affect      Gait: nonantalgic, appropriate coordination and balance     Neuro: normal light touch sensory exam of the extremities. Motor strength as noted below     Left Shoulder exam    ROM:        Limited active and passive ROM with flexion (90), abduction (80), internal and external rotation.    Tender:        posterior shoulder       Milder anterior and lateral shoulder    Non Tender:       remainder of shoulder       sternoclavicular joint       acromioclavicular joint    Strength:        abduction 5/5       internal rotation 5/5       external rotation 5/5       adduction 5/5       Mild pain with engagement of all muscles, localizing to the anterior GH joint    Impingement testing:        Minimal testing due to pain, no clear RTC concerns    Stability testing:       neg (-) anterior glide       neg (-) sulcus sign    Skin:       no visible deformities       well perfused       capillary refill brisk    Sensation:        normal sensation over shoulder and upper extremity       Radiology  Results for orders placed or performed in visit on 07/28/20   XR Shoulder Left G/E 3 Views    Narrative    XR SHOULDER LT G/E 3 VW 7/28/2020 11:29 AM     HISTORY: twisted shoulder 4 days ago.; Shoulder injury, left, initial  encounter      Impression    IMPRESSION: Humeral head superior sclerosis and mild subchondral  cortical flattening which could represent advanced osseous necrosis.  Suspected inferomedial humeral head osteophytic spurring, not well  profiled due to internal rotation. Small calcification medial to the  humerus proximal metaphysis of indeterminate etiology or significance.  The above findings would be  better evaluated with MR if clinically  warranted.    KELLY HERNANDEZ MD       Assessment:  1. Abnormal x-ray of shoulder    2. Shoulder injury, subsequent encounter        Plan:  Discussed the assessment with the patient.  Follow up: plan for 2 weeks, but will contact with MRI results presumably before that time  XR images independently visualized and reviewed with patient today in clinic  Concern for inferior spurring of humeral head, as well as abnormal flattening of the superior hueral head, suspicious for AVN  No clear risk factors based on hx or exam  MR should better delineate pathology, but may need add'l workup as well  Plan for subspecialty referral based on results  Anticipate her current strain could improve with time, suspect the work injury was an acute strain on a chronic issue  Work note provided with restrictions  Use of short 1 wk course of NSAIDs reviewed, dosing and precautions reviewed if utilized  Home handouts provided and supportive care reviewed  All questions were answered today  Contact us with additional questions or concerns  Signs and sx of concern reviewed      Adin Foster DO, FARNAZ  Primary Care Sports Medicine  [facility]           Disclaimer: This note consists of symbols derived from keyboarding, dictation and/or voice recognition software. As a result, there may be errors in the script that have gone undetected. Please consider this when interpreting information found in this chart.

## 2020-07-29 NOTE — LETTER
2020         RE: Arpita Zafar  [address]  [place] [address]        Dear Colleague,    Thank you for referring your patient, Arpita Zafar, to the [facility]. Please see a copy of my visit note below.    Arpita Zafar  :  1985  DOS: 2020  MRN: [id number]    Sports Medicine Clinic Visit    PCP: [facility], [facility]    Arpita Zafar is a 35 year old Right hand dominant female who is seen in consultation at the request of  Nell Carr C.N.P. presenting with acute left shoulder pain.    Injury: At work - reaching to [employer], noted sharp pain in left shoulder ~ 5 days ago (20).  Pain located over left deep anterior, posterior, lateral shoulder, radiating to left anterior chest wall & lateral upper arm.  Additional Features:  Positive: weakness and shooting pain.  Symptoms are better with Tylenol and Rest.  Symptoms are worse with: lying on left shoulder/supine, reaching, lifting, shoulder flexion/abduction.  Other evaluation and/or treatments so far consists of: Ice, Tylenol, Rest and UC visit.  Recent imaging completed: X-rays completed 20.  Prior History of related problems: none    Social History: works as RN at [employer]    Review of Systems  Musculoskeletal: as above  Remainder of review of systems is negative including constitutional, CV, pulmonary, GI, Skin and Neurologic except as noted in HPI or medical history.    Past Medical History:   Diagnosis Date     Abnormal Pap smear of cervix 10/31/2018    See problem list     Cervical high risk HPV (human papillomavirus) test positive 2017, 10/31/18    See problem list     Past Surgical History:   Procedure Laterality Date     CONIZATION LEEP N/A 1/15/2019    Procedure: LOOP ELECTROSURGICAL EXCISION PROCEDURE WITH CONIZATION OF CERVIX;  Surgeon: Corey Blakely MD;  Location:  OR      COLP CERVIX/UPPER VAGINA W ENDOCERV CURETT  2018      "US BREAST BIOPSY CORE NEEDLE LEFT  2018     Family History   Problem Relation Age of Onset     Diabetes Father      Malignant Hyperthermia No family hx of        Objective  /76   Ht 1.664 m (5' 5.5\")   Wt 72.1 kg (159 lb)   BMI 26.06 kg/m        General: healthy, alert and in no distress      HEENT: no scleral icterus or conjunctival erythema     Skin: no suspicious lesions or rash. No jaundice.     CV: regular rhythm by palpation, 2+ distal pulses, no pedal edema      Resp: normal respiratory effort without conversational dyspnea     Psych: normal mood and affect      Gait: nonantalgic, appropriate coordination and balance     Neuro: normal light touch sensory exam of the extremities. Motor strength as noted below     Left Shoulder exam    ROM:        Limited active and passive ROM with flexion (90), abduction (80), internal and external rotation.    Tender:        posterior shoulder       Milder anterior and lateral shoulder    Non Tender:       remainder of shoulder       sternoclavicular joint       acromioclavicular joint    Strength:        abduction 5/5       internal rotation 5/5       external rotation 5/5       adduction 5/5       Mild pain with engagement of all muscles, localizing to the anterior GH joint    Impingement testing:        Minimal testing due to pain, no clear RTC concerns    Stability testing:       neg (-) anterior glide       neg (-) sulcus sign    Skin:       no visible deformities       well perfused       capillary refill brisk    Sensation:        normal sensation over shoulder and upper extremity       Radiology  Results for orders placed or performed in visit on 07/28/20   XR Shoulder Left G/E 3 Views    Narrative    XR SHOULDER LT G/E 3 VW 7/28/2020 11:29 AM     HISTORY: twisted shoulder 4 days ago.; Shoulder injury, left, initial  encounter      Impression    IMPRESSION: Humeral head superior sclerosis and mild subchondral  cortical flattening which could represent " advanced osseous necrosis.  Suspected inferomedial humeral head osteophytic spurring, not well  profiled due to internal rotation. Small calcification medial to the  humerus proximal metaphysis of indeterminate etiology or significance.  The above findings would be better evaluated with MR if clinically  warranted.    KELLY HERNANDEZ MD       Assessment:  1. Abnormal x-ray of shoulder    2. Shoulder injury, subsequent encounter        Plan:  Discussed the assessment with the patient.  Follow up: plan for 2 weeks, but will contact with MRI results presumably before that time  XR images independently visualized and reviewed with patient today in clinic  Concern for inferior spurring of humeral head, as well as abnormal flattening of the superior hueral head, suspicious for AVN  No clear risk factors based on hx or exam  MR should better delineate pathology, but may need add'l workup as well  Plan for subspecialty referral based on results  Anticipate her current strain could improve with time, suspect the work injury was an acute strain on a chronic issue  Work note provided with restrictions  Use of short 1 wk course of NSAIDs reviewed, dosing and precautions reviewed if utilized  Home handouts provided and supportive care reviewed  All questions were answered today  Contact us with additional questions or concerns  Signs and sx of concern reviewed      Adin Foster DO, FARNAZ  Primary Care Sports Medicine  [facility]           Disclaimer: This note consists of symbols derived from keyboarding, dictation and/or voice recognition software. As a result, there may be errors in the script that have gone undetected. Please consider this when interpreting information found in this chart.    Again, thank you for allowing me to participate in the care of your patient.        Sincerely,        Adin Foster DO

## 2020-07-29 NOTE — LETTER
July 29, 2020      Arpita was seen in my office today for left shoulder pain that started at work on 7/24/2020 while reaching.  She would like to continue working as much as possible.  She should have the following restriction in place for the next 4 weeks:  Lift/carry restriction of 5 pounds with the left arm.  No work above shoulder height with the left arm.  Strictly modify or eliminate any painful or unsafe activity with the left arm.  No patient transfers.  Light duty desk work would be appropriate.  Updated recommendations will be provided at her next visit in 2 weeks, sooner if needed.      Adin Crane DO, CAUSHA  Primary Care Sports Medicine  [facility]

## 2020-07-29 NOTE — Clinical Note
Steffanie,     OK, so this one is weird.    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  No significant PMHx, no chronic medications, no prior shoulder problems, has never taken longer term oral steroids.  Significant pain with active and passive ROM, similar to patients with a GH joint OA flare.  RTC seems intact.  XR shows significant AVN in superior humeral head, and inferior spurring.  I ordered an MRI but am wondering where I will go from here (besides asking you) if the MRI confirms AVN.  Metabolic/endocrine workup?  What would be the options in a patient her age?  Her acute strain will likely improve with time, but Lucinda's box has been opened.  This message/questions are pretty rhetorical at this point, mostly looking for thoughts or insights.  This one is atypical for our clinic, especially without a known comorbidity that puts her at higher risk.    Thanks for your time!    Adin Foster

## 2020-07-31 ENCOUNTER — HOSPITAL ENCOUNTER (OUTPATIENT)
Dept: MRI IMAGING | Facility: CLINIC | Age: 35
Discharge: HOME | End: 2020-07-31
Attending: FAMILY MEDICINE | Admitting: FAMILY MEDICINE
Payer: COMMERCIAL

## 2020-07-31 DIAGNOSIS — R93.6 ABNORMAL X-RAY OF SHOULDER: ICD-10-CM

## 2020-07-31 DIAGNOSIS — S49.90XD SHOULDER INJURY, SUBSEQUENT ENCOUNTER: ICD-10-CM

## 2020-07-31 PROCEDURE — 73221 MRI JOINT UPR EXTREM W/O DYE: CPT | Mod: LT

## 2020-08-04 ENCOUNTER — TELEPHONE (OUTPATIENT)
Dept: ORTHOPEDICS | Facility: CLINIC | Age: 35
End: 2020-08-04

## 2020-08-04 DIAGNOSIS — M25.512 CHRONIC LEFT SHOULDER PAIN: ICD-10-CM

## 2020-08-04 DIAGNOSIS — G89.29 CHRONIC LEFT SHOULDER PAIN: ICD-10-CM

## 2020-08-04 DIAGNOSIS — M87.022 AVASCULAR NECROSIS OF LEFT HUMERAL HEAD (H): Primary | ICD-10-CM

## 2020-08-05 NOTE — TELEPHONE ENCOUNTER
Reviewed MRI in detail.  Referral placed for further mgmt with Dr Steffanie Stockton of [facility].  All questions answered.      Adin Foster DO, CAQ  Primary Care Sports Medicine  [facility]

## 2020-08-05 NOTE — TELEPHONE ENCOUNTER
Reason for Call:  Other call back    Detailed comments: Pt states she missed Dr. Foster's call for MRI results yesterday - just calling back.    Phone Number Patient can be reached at: Home number on file [phone] (home)    Best Time:     Can we leave a detailed message on this number? YES    Call taken on 8/5/2020 at 3:30 PM by Briana Mi

## 2020-08-07 ENCOUNTER — TELEPHONE (OUTPATIENT)
Dept: ORTHOPEDICS | Facility: CLINIC | Age: 35
End: 2020-08-07

## 2020-08-07 NOTE — TELEPHONE ENCOUNTER
8/7 Called and left voicemail. Provided phone number [phone] to schedule an appointment with Dr. Stockton on 8/13 at 4:15 or next available.     Yesenia Hop   Procedure    Ortho/Sports Med/Pod/Ent/Eye/Surgical Specialties  [facility]   [phone]    ----- Message from Kaleb Lemos RN sent at 8/6/2020  2:25 PM CDT -----  Regarding: FW: Dr. Stockton - could pt be worked in next week  OK to add Pt on to 4:15p next week Thursday. Otherwise, might have to wait a couple more weeks.    Kyle Lemos RN   ----- Message -----  From: Caren Pruitt  Sent: 8/6/2020  11:32 AM CDT  To: [facility] [place]  Subject: Dr. Stockton - could pt be worked in next we#    Hello,    Dr. Foster has apparently reached out to Dr. Stockton about seeing this patient and she would like to be seen in [place].  Is there a spot where she can be seen next week.  She is off work until Friday next week.    Avascular necrosis of left humeral head (H) [M87.022]  Chronic left shoulder pain [M25.512, G89.29]    Thanks,    Caren Pruitt  Ortho

## 2020-08-13 ENCOUNTER — TELEPHONE (OUTPATIENT)
Dept: ORTHOPEDICS | Facility: CLINIC | Age: 35
End: 2020-08-13
Payer: COMMERCIAL

## 2020-08-13 ENCOUNTER — OFFICE VISIT (OUTPATIENT)
Dept: ORTHOPEDICS | Facility: CLINIC | Age: 35
End: 2020-08-13
Attending: FAMILY MEDICINE
Payer: COMMERCIAL

## 2020-08-13 VITALS
WEIGHT: 161.2 LBS | OXYGEN SATURATION: 98 % | HEART RATE: 80 BPM | DIASTOLIC BLOOD PRESSURE: 95 MMHG | SYSTOLIC BLOOD PRESSURE: 136 MMHG | HEIGHT: 67 IN | BODY MASS INDEX: 25.3 KG/M2

## 2020-08-13 DIAGNOSIS — G89.29 CHRONIC LEFT SHOULDER PAIN: ICD-10-CM

## 2020-08-13 DIAGNOSIS — M87.022 AVASCULAR NECROSIS OF LEFT HUMERAL HEAD (H): ICD-10-CM

## 2020-08-13 DIAGNOSIS — M25.512 CHRONIC LEFT SHOULDER PAIN: ICD-10-CM

## 2020-08-13 PROCEDURE — 99203 OFFICE O/P NEW LOW 30 MIN: CPT | Performed by: ORTHOPAEDIC SURGERY

## 2020-08-13 RX ORDER — OMEGA-3 FATTY ACIDS/FISH OIL 300-1000MG
600-800 CAPSULE ORAL EVERY 4 HOURS PRN
COMMUNITY
End: 2021-03-05

## 2020-08-13 RX ORDER — ACETAMINOPHEN 500 MG
1000 TABLET ORAL EVERY 6 HOURS PRN
COMMUNITY
End: 2021-03-09

## 2020-08-13 ASSESSMENT — PAIN SCALES - GENERAL: PAINLEVEL: NO PAIN (1)

## 2020-08-13 ASSESSMENT — MIFFLIN-ST. JEOR: SCORE: 1455.89

## 2020-08-13 NOTE — LETTER
8/13/2020         RE: Arpita Zafar  [address]  [place] [address]        Dear Colleague,    Thank you for referring your patient, Arpita Zafar, to the [facility]. Please see a copy of my visit note below.    CHIEF CONCERN:  Left shoulder pain    HISTORY OF PRESENT ILLNESS:  Arpita is a pleasant 35 year old right hand dominant woman who presents for evaluation of the above concern.  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  She had in the past noted some discomfort at times while working out but nothing really limiting. Then she injured her shoulder at work on 7/25/2020 reaching to unplug a cord from an outlet.  She describes an acute and dramatic increase in pain after that event. She was seen in urgent care and followed up with Dr. Foster in Sports Medicine.  An MR was obtained that demonstrated AVN with collapse of the humeral head.  She is unable to raise her left arm above her shoulder and has difficulty reaching behind her back.  She has decreased strength in the left arm.  She is unable to lay on her left side to sleep. She will alternate acetaminophen and ibuprofen as needed for pain.      Past Medical History:   Diagnosis Date     Abnormal Pap smear of cervix 10/31/2018    See problem list     Cervical high risk HPV (human papillomavirus) test positive 8/16/2017, 10/31/18    See problem list       Past Surgical History:   Procedure Laterality Date     CONIZATION LEEP N/A 1/15/2019    Procedure: LOOP ELECTROSURGICAL EXCISION PROCEDURE WITH CONIZATION OF CERVIX;  Surgeon: Corey Blakely MD;  Location:  OR      COLP CERVIX/UPPER VAGINA W ENDOCERV CURETT  2018     US BREAST BIOPSY CORE NEEDLE LEFT  2018       No current outpatient medications on file.        No Known Allergies    SOCIAL HISTORY:    Social History     Socioeconomic History     Marital status: Single     Spouse name: partner- Caesar     Number of children: 0     Years of education: Not  on file     Highest education level: Not on file   Occupational History     Occupation: RN     Comment: [employer]   Social Needs     Financial resource strain: Not on file     Food insecurity     Worry: Not on file     Inability: Not on file     Transportation needs     Medical: Not on file     Non-medical: Not on file   Tobacco Use     Smoking status: Former Smoker     Types: Cigarettes     Last attempt to quit: 10/31/2015     Years since quittin.7     Smokeless tobacco: Never Used   Substance and Sexual Activity     Alcohol use: Yes     Drug use: No     Sexual activity: Yes     Partners: Male     Birth control/protection: None   Lifestyle     Physical activity     Days per week: Not on file     Minutes per session: Not on file     Stress: Not on file   Relationships     Social connections     Talks on phone: Not on file     Gets together: Not on file     Attends [employer]ism service: Not on file     Active member of club or organization: Not on file     Attends meetings of clubs or organizations: Not on file     Relationship status: Not on file     Intimate partner violence     Fear of current or ex partner: Not on file     Emotionally abused: Not on file     Physically abused: Not on file     Forced sexual activity: Not on file   Other Topics Concern     Parent/sibling w/ CABG, MI or angioplasty before 65F 55M? Not Asked   Social History Narrative     Not on file       FAMILY HISTORY: Reviewed in EMR      REVIEW OF SYSTEMS: Positive for that noted in past medical history and history of present illness and otherwise reviewed in EMR     PHYSICAL EXAM:    Adult female in no acute distress. Articulates and communicates with normal affect. Accompanied by her mother.  Respirations even and unlabored  Focused upper extremity exam: Skin intact. No erythema. Sensation intact all dermatomes into the hand to light touch. EPL, FPL, and Intrinsics intact. Right shoulder active motion is FE to 175, ER at side to 80,  and IR to T10. Left shoulder active motion is FE to 115, ER to 75, and IR to L2 with pain.  Pain with Neer and Domingo. No pain on palpation over the AC joint. Mild pain on palpation over the long head of the biceps.     IMAGING:  Left shoulder MRI dated 7/31/20 was reviewed and I agree with the impression below:  Impression:  1. Avascular necrosis of the superior-medial humeral head with collapse and overlying full-thickness cartilage loss.  2. Small joint effusion with mild synovitis and scattered joint bodies.  3. Intact rotator cuff.  4. Os acromiale. Mild acromioclavicular joint degenerative change.    ASSESSMENT:    1. Left humeral head osteonecrosis with collapse    PLAN:  I discussed the finding of AVN and the significance of collapse seen on imaging. We discussed that in the setting of significant collapse the only surgical option would be a resurfacing/arthroplasty approach. If she is able to have symtpoms improved or managed with glenohumeral joint cortisone injections those could be done intermittently. She would like to pursue that. If symptoms are not managed in that way she should be in touch with my office should she wish to return and discuss surgical options further.     Steffanie Stockton MD      Again, thank you for allowing me to participate in the care of your patient.        Sincerely,        Steffanie Stockton MD

## 2020-08-13 NOTE — NURSING NOTE
"Arpita Zafar's chief complaint for this visit includes:  Chief Complaint   Patient presents with     Left Shoulder - Pain     Concern for AVN     PCP: [facility]    Referring Provider:  Adin Foster DO  [address] [place] [address]    BP (!) 136/95 (BP Location: Left arm, Patient Position: Sitting, Cuff Size: Adult Regular)   Pulse 80   Ht 1.689 m (5' 6.5\")   Wt 73.1 kg (161 lb 3.2 oz)   SpO2 98%   BMI 25.63 kg/m    No Pain (1)     Do you need any medication refills at today's visit? No    Jacquelin Rodriguez CMA        "

## 2020-08-13 NOTE — TELEPHONE ENCOUNTER
----- Message from Adin Foster DO sent at 8/13/2020 12:45 PM CDT -----  Absolutely.  We will call her today and should be able to get her in tomorrow if she is interested.  Otherwise will make sure she gets it next week.    Thanks!    Adin  ----- Message -----  From: Steffanie Stockton MD  Sent: 8/13/2020  11:21 AM CDT  To: DO Adin Beck    We discussed the AVN. Because of the collapse the only surgical option is humeral head resurfacing. We don't want to do that yet so she would like to try an injection. Can you get her in for an US guided Glenohumeral cortisone injection? We'll see what the response is to that and go from there.    Steffanie

## 2020-08-13 NOTE — TELEPHONE ENCOUNTER
JESSICAM with detailed information regarding contacting patient to schedule ultrasound guided glenohumeral injection with Dr Foster.  Would be able to offer patient work in appointment on Friday 8/14, if desired, otherwise may schedule next week.  Will await return phone call from patient.    Shine Nguyen ATC

## 2020-08-13 NOTE — LETTER
Arpita Charisma     1985  Encounter date/time:  08/13/20       Report of Workability    Physician:  Steffanie Stockton MD    Diagnosis:  Left shoulder pain/injury    Limitations:  No away from body, shoulder height or overhead work with left arm. No push, pull, lift, carry with left arm.   May write or type.      Return to work status: Return to work WITH limitations.    Follow-Up:   Return to clinic with Dr. Foster -  Pending injection.

## 2020-08-25 NOTE — PROGRESS NOTES
Arpita Zafar  :  1985  DOS: 2020  MRN: [id number]    Sports Medicine Clinic Procedure    Ultrasound Guided Left Intra-Articular Shoulder Injection    Clinical History: Patient presents in follow up for her right shoulder injury and for glenohumeral injection as directed by Dr Stockton.    Diagnosis:   1. Avascular necrosis of left humeral head (H)    2. Chronic left shoulder pain    3. Shoulder injury, subsequent encounter      Referring Physician: Steffanie Stockton MD  Large Joint Injection/Arthocentesis: L glenohumeral joint    Date/Time: 2020 9:39 AM  Performed by: Adin Foster DO  Authorized by: Adin Foster DO     Indications:  Pain  Needle Size:  22 G  Guidance: ultrasound    Location:  Shoulder      Site:  L glenohumeral joint  Medications:  40 mg triamcinolone 40 MG/ML; 3 mL ropivacaine 5 MG/ML  Outcome:  Tolerated well, no immediate complications  Procedure discussed: discussed risks, benefits, and alternatives    Consent Given by:  Patient  Prep: patient was prepped and draped in usual sterile fashion          Impression:  Successful Left intra-articular shoulder injection.    Plan:  Follow up with prn with Dr Stockton or myself   Expectations and goals of CSI reviewed  Often 2-3 days for steroid effect, and can take up to two weeks for maximum effect  We discussed modified progressive pain-free activity as tolerated  Do not overuse in first two weeks if feeling better due to concern for vulnerability while steroid is working  Supportive care reviewed  All questions were answered today  Contact us with additional questions or concerns  Signs and sx of concern reviewed      Adin Foster DO, CAQ  Primary Care Sports Medicine  [facility]

## 2020-08-26 ENCOUNTER — OFFICE VISIT (OUTPATIENT)
Dept: ORTHOPEDICS | Facility: CLINIC | Age: 35
End: 2020-08-26
Payer: COMMERCIAL

## 2020-08-26 VITALS — BODY MASS INDEX: 25.88 KG/M2 | HEIGHT: 66 IN | WEIGHT: 161 LBS

## 2020-08-26 DIAGNOSIS — M87.022 AVASCULAR NECROSIS OF LEFT HUMERAL HEAD (H): Primary | ICD-10-CM

## 2020-08-26 DIAGNOSIS — S49.90XD SHOULDER INJURY, SUBSEQUENT ENCOUNTER: ICD-10-CM

## 2020-08-26 DIAGNOSIS — M25.512 CHRONIC LEFT SHOULDER PAIN: ICD-10-CM

## 2020-08-26 DIAGNOSIS — G89.29 CHRONIC LEFT SHOULDER PAIN: ICD-10-CM

## 2020-08-26 PROCEDURE — 20611 DRAIN/INJ JOINT/BURSA W/US: CPT | Mod: LT | Performed by: FAMILY MEDICINE

## 2020-08-26 RX ORDER — TRIAMCINOLONE ACETONIDE 40 MG/ML
40 INJECTION, SUSPENSION INTRA-ARTICULAR; INTRAMUSCULAR
Status: DISCONTINUED | OUTPATIENT
Start: 2020-08-26 | End: 2021-01-04

## 2020-08-26 RX ORDER — ROPIVACAINE HYDROCHLORIDE 5 MG/ML
3 INJECTION, SOLUTION EPIDURAL; INFILTRATION; PERINEURAL
Status: DISCONTINUED | OUTPATIENT
Start: 2020-08-26 | End: 2021-01-04

## 2020-08-26 RX ADMIN — TRIAMCINOLONE ACETONIDE 40 MG: 40 INJECTION, SUSPENSION INTRA-ARTICULAR; INTRAMUSCULAR at 09:39

## 2020-08-26 RX ADMIN — ROPIVACAINE HYDROCHLORIDE 3 ML: 5 INJECTION, SOLUTION EPIDURAL; INFILTRATION; PERINEURAL at 09:39

## 2020-08-26 ASSESSMENT — MIFFLIN-ST. JEOR: SCORE: 1439.1

## 2020-08-26 NOTE — LETTER
2020         RE: Arpita Zafar  [address]  [place] [address]        Dear Colleague,    Thank you for referring your patient, Arpita Zafar, to the [facility]. Please see a copy of my visit note below.    Arpita Zafar  :  1985  DOS: 2020  MRN: [id number]    Sports Medicine Clinic Procedure    Ultrasound Guided Left Intra-Articular Shoulder Injection    Clinical History: Patient presents in follow up for her right shoulder injury and for glenohumeral injection as directed by Dr Stockton.    Diagnosis:   1. Avascular necrosis of left humeral head (H)    2. Chronic left shoulder pain    3. Shoulder injury, subsequent encounter      Referring Physician: Steffanie Stockton MD  Large Joint Injection/Arthocentesis: L glenohumeral joint    Date/Time: 2020 9:39 AM  Performed by: Adin Foster DO  Authorized by: Adin Foster DO     Indications:  Pain  Needle Size:  22 G  Guidance: ultrasound    Location:  Shoulder      Site:  L glenohumeral joint  Medications:  40 mg triamcinolone 40 MG/ML; 3 mL ropivacaine 5 MG/ML  Outcome:  Tolerated well, no immediate complications  Procedure discussed: discussed risks, benefits, and alternatives    Consent Given by:  Patient  Prep: patient was prepped and draped in usual sterile fashion          Impression:  Successful Left intra-articular shoulder injection.    Plan:  Follow up with prn with Dr Stockton or myself   Expectations and goals of CSI reviewed  Often 2-3 days for steroid effect, and can take up to two weeks for maximum effect  We discussed modified progressive pain-free activity as tolerated  Do not overuse in first two weeks if feeling better due to concern for vulnerability while steroid is working  Supportive care reviewed  All questions were answered today  Contact us with additional questions or concerns  Signs and sx of concern reviewed      Adin Foster DO,  CA  Primary Care Sports Medicine  [facility]       Again, thank you for allowing me to participate in the care of your patient.        Sincerely,        Adin Foster, DO

## 2020-08-27 ENCOUNTER — MYC MEDICAL ADVICE (OUTPATIENT)
Dept: ORTHOPEDICS | Facility: CLINIC | Age: 35
End: 2020-08-27

## 2020-09-08 ENCOUNTER — E-VISIT (OUTPATIENT)
Dept: FAMILY MEDICINE | Facility: CLINIC | Age: 35
End: 2020-09-08
Payer: COMMERCIAL

## 2020-09-08 ENCOUNTER — TELEPHONE (OUTPATIENT)
Dept: FAMILY MEDICINE | Facility: CLINIC | Age: 35
End: 2020-09-08

## 2020-09-08 DIAGNOSIS — N39.0 ACUTE UTI (URINARY TRACT INFECTION): Primary | ICD-10-CM

## 2020-09-08 PROCEDURE — 99421 OL DIG E/M SVC 5-10 MIN: CPT | Performed by: NURSE PRACTITIONER

## 2020-09-08 RX ORDER — NITROFURANTOIN 25; 75 MG/1; MG/1
100 CAPSULE ORAL 2 TIMES DAILY
Qty: 10 CAPSULE | Refills: 0 | Status: SHIPPED | OUTPATIENT
Start: 2020-09-08 | End: 2020-09-13

## 2020-09-08 NOTE — PATIENT INSTRUCTIONS
Thank you for choosing us for your care. I have placed an order for a prescription so that you can start treatment. View your full visit summary for details by clicking on the link below. Your pharmacist will able to address any questions you may have about the medication.     If you re not feeling better within 2-3 days, please schedule an appointment.  You can schedule an appointment right here in [employer], or call [phone]  If the visit is for the same symptoms as your e-visit, we ll refund the cost of your e-visit if seen within seven days.      Urinary Tract Infections in Women    Urinary tract infections (UTIs) are most often caused by bacteria. These bacteria enter the urinary tract. The bacteria may come from outside the body. Or they may travel from the skin outside the rectum or vagina into the urethra. Female anatomy makes it easy for bacteria from the bowel to enter a woman s urinary tract, which is the most common source of UTI. This means women develop UTIs more often than men. Pain in or around the urinary tract is a common UTI symptom. But the only way to know for sure if you have a UTI for the healthcare provider to test your urine. The two tests that may be done are the urinalysis and urine culture.  Types of UTIs    Cystitis. A bladder infection (cystitis) is the most common UTI in women. You may have urgent or frequent urination. You may also have pain, burning when you urinate, and bloody urine.    Urethritis. This is an inflamed urethra, which is the tube that carries urine from the bladder to outside the body. You may have lower stomach or back pain. You may also have urgent or frequent urination.    Pyelonephritis. This is a kidney infection. If not treated, it can be serious and damage your kidneys. In severe cases, you may need to stay in the hospital. You may have a fever and lower back pain.  Medicines to treat a UTI  Most UTIs are treated with antibiotics. These kill the bacteria.  The length of time you need to take them depends on the type of infection. It may be as short as 3 days. If you have repeated UTIs, you may need a low-dose antibiotic for several months. Take antibiotics exactly as directed. Don t stop taking them until all of the medicine is gone. If you stop taking the antibiotic too soon, the infection may not go away. You may also develop a resistance to the antibiotic. This can make it much harder to treat.  Lifestyle changes to treat and prevent UTIs  The lifestyle changes below will help get rid of your UTI. They may also help prevent future UTIs.    Drink plenty of fluids. This includes water, juice, or other caffeine-free drinks. Fluids help flush bacteria out of your body.    Empty your bladder. Always empty your bladder when you feel the urge to urinate. And always urinate before going to sleep. Urine that stays in your bladder can lead to infection. Try to urinate before and after sex as well.    Practice good personal hygiene. Wipe yourself from front to back after using the toilet. This helps keep bacteria from getting into the urethra.    Use condoms during sex. These help prevent UTIs caused by sexually transmitted bacteria. Also don't use spermicides during sex. These can increase the risk for UTIs. Choose other forms of birth control instead. For women who tend to get UTIs after sex, a low-dose of a preventive antibiotic may be used. Be sure to discuss this option with your healthcare provider.    Follow up with your healthcare provider as directed. He or she may test to make sure the infection has cleared. If needed, more treatment may be started.  Date Last Reviewed: 1/1/2017 2000-2019 [employer]. [address] [place] [address], [place] [address]. All rights reserved. This information is not intended as a substitute for professional medical care. Always follow your healthcare professional's instructions.

## 2020-09-08 NOTE — TELEPHONE ENCOUNTER
Patient is verifying that after the evisit today her scripts are being sent to the [facility].  Their number is   [phone].

## 2020-09-10 ENCOUNTER — OFFICE VISIT (OUTPATIENT)
Dept: ORTHOPEDICS | Facility: CLINIC | Age: 35
End: 2020-09-10
Payer: OTHER MISCELLANEOUS

## 2020-09-10 VITALS — OXYGEN SATURATION: 96 % | SYSTOLIC BLOOD PRESSURE: 157 MMHG | DIASTOLIC BLOOD PRESSURE: 115 MMHG | HEART RATE: 87 BPM

## 2020-09-10 DIAGNOSIS — M87.022 AVASCULAR NECROSIS OF LEFT HUMERAL HEAD (H): Primary | ICD-10-CM

## 2020-09-10 PROCEDURE — 99213 OFFICE O/P EST LOW 20 MIN: CPT | Performed by: ORTHOPAEDIC SURGERY

## 2020-09-10 ASSESSMENT — PAIN SCALES - GENERAL: PAINLEVEL: NO PAIN (1)

## 2020-09-10 NOTE — PATIENT INSTRUCTIONS
Thanks for coming today.  Ortho/Sports Medicine Clinic  [address] [place] [address]    To schedule future appointments in Ortho Clinic, you may call [phone].    To schedule ordered imaging by your provider:   Call [facility] [phone]    To schedule an injection ordered by your provider:  Call [facility] Injection scheduling line: [phone]  [employer] available online at:  [email or web address]    Please call if any further questions or concerns ([phone]).  Clinic hours 8 am to 5 pm.    Return to clinic (call) if symptoms worsen or fail to improve.

## 2020-09-10 NOTE — LETTER
9/10/2020         RE: Arpita Zafar  [address]  [place] [address]        Dear Colleague,    Thank you for referring your patient, Arpita Zafar, to the [facility]. Please see a copy of my visit note below.    CHIEF CONCERN:  Follow up after left shoulder glenohumeral injection done on 8/26/20 by Dr. Foster    HISTORY OF PRESENT ILLNESS:  Arpita is a 35 year old woman who returns today to discuss her response to her GH injection for AVN with collapse. The injection on 8/26 provided relief but it was extremely short lived. She is really very limited and is wanting to discuss other options.    PHYSICAL EXAM:    Adult female in no acute distress. Articulates and communicates with normal affect. Seen with her fiance.   Respirations even and unlabored  Focused upper extremity exam:  Her active left shoulder ROM is FE to 120, ER to 70, and IR to T12.    IMAGING:  None new    ASSESSMENT:  1. Left humeral head AVN with collapse    PLAN:  We discussed the literature surrounding options for AVN, both with an without collapse. We discussed the non-operative and operative treatment options including the risks and potential benefits of each. She has had nonoperative treatment but her stiffness and pain is very lifestyle limiting for her, especially from her job standpoint. We discussed the expectations for pain relief and/or motion improvement with surgical treatment in the form of humeral head resurfacing arthroplasty. We discussed the risks of bleeding, infection, fracture, risks from anesthesia, and likelihood of future additional surgeries given her very young age. She does not feel that non-operatively management thus far has adequately improved symptoms and pain is very limiting at this time. She would like to proceed with surgical treatment. We reviewed postoperative rehab protocols and expectations. She would like to proceed with surgical scheduling and we will work with her in  this regard.   We discussed the Catalyst implant option and possible R.A.P. for surgery vs outpatient overnight/Obs status at [place].         Steffanie Stockton MD    Again, thank you for allowing me to participate in the care of your patient.        Sincerely,        Steffanie Stockton MD

## 2020-09-10 NOTE — NURSING NOTE
Arpita Zafar's chief complaint for this visit includes:  Chief Complaint   Patient presents with     Left Shoulder - Follow Up     Left GH inj done on 8/26 by Dr. Foster     PCP: [facility], [facility]    Referring Provider:  No referring provider defined for this encounter.    BP (!) 157/115   Pulse 87   SpO2 96%   No Pain (1) , 7/10 pain with certain activities.    Do you need any medication refills at today's visit? N/a    Injection relief for 1 day, now feels a little worse, hard to tell if back to baseline or not.

## 2020-09-10 NOTE — PROGRESS NOTES
CHIEF CONCERN:  Follow up after left shoulder glenohumeral injection done on 8/26/20 by Dr. Foster    HISTORY OF PRESENT ILLNESS:  Arpita is a 35 year old woman who returns today to discuss her response to her GH injection for AVN with collapse. The injection on 8/26 provided relief but it was extremely short lived. She is really very limited and is wanting to discuss other options.    PHYSICAL EXAM:    Adult female in no acute distress. Articulates and communicates with normal affect. Seen with her fiance.   Respirations even and unlabored  Focused upper extremity exam:  Her active left shoulder ROM is FE to 120, ER to 70, and IR to T12.    IMAGING:  None new    ASSESSMENT:  1. Left humeral head AVN with collapse    PLAN:  We discussed the literature surrounding options for AVN, both with an without collapse. We discussed the non-operative and operative treatment options including the risks and potential benefits of each. She has had nonoperative treatment but her stiffness and pain is very lifestyle limiting for her, especially from her job standpoint. We discussed the expectations for pain relief and/or motion improvement with surgical treatment in the form of humeral head resurfacing arthroplasty. We discussed the risks of bleeding, infection, fracture, risks from anesthesia, and likelihood of future additional surgeries given her very young age. She does not feel that non-operatively management thus far has adequately improved symptoms and pain is very limiting at this time. She would like to proceed with surgical treatment. We reviewed postoperative rehab protocols and expectations. She would like to proceed with surgical scheduling and we will work with her in this regard.   We discussed the Catalyst implant option and possible R.A.P. for surgery vs outpatient overnight/Obs status at [place].         Steffanie Stockton MD

## 2020-09-27 ENCOUNTER — OFFICE VISIT (OUTPATIENT)
Dept: URGENT CARE | Facility: URGENT CARE | Age: 35
End: 2020-09-27
Payer: OTHER MISCELLANEOUS

## 2020-09-27 VITALS
WEIGHT: 159.13 LBS | OXYGEN SATURATION: 97 % | RESPIRATION RATE: 16 BRPM | HEART RATE: 80 BPM | DIASTOLIC BLOOD PRESSURE: 108 MMHG | SYSTOLIC BLOOD PRESSURE: 136 MMHG | BODY MASS INDEX: 26.08 KG/M2 | TEMPERATURE: 98.3 F

## 2020-09-27 DIAGNOSIS — M87.00 AVASCULAR BONE NECROSIS (H): Primary | ICD-10-CM

## 2020-09-27 PROCEDURE — 99214 OFFICE O/P EST MOD 30 MIN: CPT | Performed by: PHYSICIAN ASSISTANT

## 2020-09-27 RX ORDER — TRAMADOL HYDROCHLORIDE 50 MG/1
50 TABLET ORAL EVERY 6 HOURS PRN
Qty: 10 TABLET | Refills: 0 | Status: SHIPPED | OUTPATIENT
Start: 2020-09-27 | End: 2020-09-30

## 2020-09-27 ASSESSMENT — ENCOUNTER SYMPTOMS: ARTHRALGIAS: 1

## 2020-09-27 NOTE — PROGRESS NOTES
SUBJECTIVE:   Arpita Zafar is a 35 year old female presenting with a chief complaint of   Chief Complaint   Patient presents with     Shoulder Pain     Left should pain, was seen by ortho on 9/10 and dx w/ avascular necrosis of the left humeral head     Letter for School/Work     Missed work today so would like a letter, she does not need to go back until Tuesday      Refill Request     Would like a stronger pain meds so she can get through these couple of days until it calms down, does not have an upcoming appt with her surgeon, Dr. Stockton gave a Cortisone shot that did not work and now she is considering surgery     Work Comp     The shoulder injury happened at the end of July and she is still working the Work Comp issues out per patient       She is an established patient of [place].  Left shoulder pain following a cortisone injection (8/26).  Patient missed work today.  Patient due back on Tuesday.  She has a diagnosis of avascular necrosis. Patient requesting note for work and stronger pain medication.  No history of seizure disorder.  RHD.      No other PMHx.  No allergies to medication      Review of Systems   Musculoskeletal: Positive for arthralgias.   All other systems reviewed and are negative.      Past Medical History:   Diagnosis Date     Abnormal Pap smear of cervix 10/31/2018    See problem list     Cervical high risk HPV (human papillomavirus) test positive 8/16/2017, 10/31/18    See problem list     Family History   Problem Relation Age of Onset     Diabetes Father      Malignant Hyperthermia No family hx of      Current Outpatient Medications   Medication Sig Dispense Refill     acetaminophen (TYLENOL) 500 MG tablet Take 1,000 mg by mouth every 6 hours as needed for mild pain       acetaminophen-codeine (TYLENOL #3) 300-30 MG tablet Take 1 tablet by mouth every 6 hours as needed for severe pain       ibuprofen (ADVIL/MOTRIN) 200 MG capsule Take 600-800 mg by mouth every 4 hours as needed  for fever       traMADol (ULTRAM) 50 MG tablet Take 1 tablet (50 mg) by mouth every 6 hours as needed for severe pain 10 tablet 0     Social History     Tobacco Use     Smoking status: Former Smoker     Types: Cigarettes     Last attempt to quit: 10/31/2015     Years since quittin.9     Smokeless tobacco: Never Used   Substance Use Topics     Alcohol use: Yes       OBJECTIVE  BP (!) 136/108 (BP Location: Left arm, Patient Position: Chair, Cuff Size: Adult Regular)   Pulse 80   Temp 98.3  F (36.8  C) (Tympanic)   Resp 16   Wt 72.2 kg (159 lb 2 oz)   SpO2 97%   Breastfeeding No   BMI 26.08 kg/m      Physical Exam  Vitals signs and nursing note reviewed.   Constitutional:       Appearance: Normal appearance. She is normal weight.   Cardiovascular:      Rate and Rhythm: Normal rate and regular rhythm.      Pulses: Normal pulses.      Heart sounds: Normal heart sounds.   Pulmonary:      Effort: Pulmonary effort is normal.      Breath sounds: Normal breath sounds.   Musculoskeletal:      Comments: limited ROM on left shoulder.   Skin:     Capillary Refill: Capillary refill takes less than 2 seconds.   Neurological:      General: No focal deficit present.      Mental Status: She is alert.   Psychiatric:         Mood and Affect: Mood normal.         Labs:  No results found for this or any previous visit (from the past 24 hour(s)).    X-Ray was not done.    ASSESSMENT:      ICD-10-CM    1. Avascular bone necrosis (H)  M87.00 traMADol (ULTRAM) 50 MG tablet        Medical Decision Making:      Serious Comorbid Conditions:  Adult:  None    PLAN:    Ultram.  Note to return to work on 2020.    Followup:    If not improving or if condition worsens, follow up with your Primary Care Provider, If not improving or if conditions worsens over the next 12-24 hours, go to the Emergency Department    There are no Patient Instructions on file for this visit.

## 2020-09-27 NOTE — LETTER
[address] [address]  Phone: [phone]    September 27, 2020        Arpita Zafar  [address] [facility] [address]          To whom it may concern:    RE: Arpita Zafar    Patient was seen and treated today at our clinic and missed work.  She may return to work on 09/29/2020.    Please contact me for questions or concerns.      Sincerely,        Marla Chacon PA-C

## 2020-10-05 ENCOUNTER — VIRTUAL VISIT (OUTPATIENT)
Dept: FAMILY MEDICINE | Facility: CLINIC | Age: 35
End: 2020-10-05
Payer: OTHER MISCELLANEOUS

## 2020-10-05 DIAGNOSIS — M87.019 AVASCULAR NECROSIS OF BONE OF SHOULDER (H): Primary | ICD-10-CM

## 2020-10-05 PROCEDURE — 99213 OFFICE O/P EST LOW 20 MIN: CPT | Mod: 95 | Performed by: NURSE PRACTITIONER

## 2020-10-05 NOTE — LETTER
October 5, 2020      Arpita Zafar  [address]  [place] [facility] [address]        To Whom It May Concern:    Arpita Zafar was seen 10/5/2020 for ongoing shoulder pain requiring the use of narcotic medication intermittently.  For this reason she missed work 10/5/2020.  Please excuser her absences 10/5/2020 and 10/6/2020. She may return to work for her scheduled shift on 10/8/2020.       Sincerely,        SALAS Singletary CNP

## 2020-10-05 NOTE — PROGRESS NOTES
"Arpita Zafar is a 35 year old female who is being evaluated via a billable video visit.      The patient has been notified of following:     \"This video visit will be conducted via a call between you and your physician/provider. We have found that certain health care needs can be provided without the need for an in-person physical exam.  This service lets us provide the care you need with a video conversation.  If a prescription is necessary we can send it directly to your pharmacy.  If lab work is needed we can place an order for that and you can then stop by our lab to have the test done at a later time.    Video visits are billed at different rates depending on your insurance coverage.  Please reach out to your insurance provider with any questions.    If during the course of the call the physician/provider feels a video visit is not appropriate, you will not be charged for this service.\"    Patient has given verbal consent for Video visit? Yes  How would you like to obtain your AVS? MyChart  If you are dropped from the video visit, the video invite should be resent to: Text to cell phone: per joe  Will anyone else be joining your video visit? No    Subjective     Arpita Zafar is a 35 year old female who presents today via video visit for the following health issues:    HPI     Has avascular necrosis of the humeral head.  She is following with ortho.  It is a workman's compensation injury.  She is on light duty restrictions.  She intermittently has to take narcotic medication for the pain.  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  So, she had to call off.  Ortho would not write note for her without seeing her.         Video Start Time: 5:31 PM      Review of Systems   Constitutional, HEENT, cardiovascular, pulmonary, gi and gu systems are negative, except as otherwise noted.      Objective           Vitals:  No vitals were obtained today " "due to virtual visit.    Physical Exam     GENERAL: Healthy, alert and no distress  EYES: Eyes grossly normal to inspection.  No discharge or erythema, or obvious scleral/conjunctival abnormalities.  RESP: No audible wheeze, cough, or visible cyanosis.  No visible retractions or increased work of breathing.    SKIN: Visible skin clear. No significant rash, abnormal pigmentation or lesions.  NEURO: Cranial nerves grossly intact.  Mentation and speech appropriate for age.  PSYCH: Mentation appears normal, affect normal/bright, judgement and insight intact, normal speech and appearance well-groomed.              Assessment & Plan     Avascular necrosis of bone of shoulder (H)  See above.  Work excuse note written.       BMI:   Estimated body mass index is 26.08 kg/m  as calculated from the following:    Height as of 8/26/20: 1.664 m (5' 5.5\").    Weight as of 9/27/20: 72.2 kg (159 lb 2 oz).              No follow-ups on file.    SALAS Singletary CNP  [facility]      Video-Visit Details    Type of service:  Video Visit    Video End Time:5:40 PM    Originating Location (pt. Location): Home    Distant Location (provider location):  [facility]     Platform used for Video Visit: Mj        "

## 2020-10-06 PROBLEM — M87.019: Status: ACTIVE | Noted: 2020-10-06

## 2020-10-20 ENCOUNTER — VIRTUAL VISIT (OUTPATIENT)
Dept: FAMILY MEDICINE | Facility: CLINIC | Age: 35
End: 2020-10-20
Payer: OTHER MISCELLANEOUS

## 2020-10-20 ENCOUNTER — MYC MEDICAL ADVICE (OUTPATIENT)
Dept: FAMILY MEDICINE | Facility: CLINIC | Age: 35
End: 2020-10-20

## 2020-10-20 DIAGNOSIS — M87.019 AVASCULAR NECROSIS OF BONE OF SHOULDER (H): Primary | ICD-10-CM

## 2020-10-20 PROCEDURE — 99207 PR NO BILLABLE SERVICE THIS VISIT: CPT | Performed by: FAMILY MEDICINE

## 2020-10-20 NOTE — PROGRESS NOTES
"Arpita Zafar is a 35 year old female who is being evaluated via a billable video visit.      The patient has been notified of following:     \"This video visit will be conducted via a call between you and your physician/provider. We have found that certain health care needs can be provided without the need for an in-person physical exam.  This service lets us provide the care you need with a video conversation.  If a prescription is necessary we can send it directly to your pharmacy.  If lab work is needed we can place an order for that and you can then stop by our lab to have the test done at a later time.    Video visits are billed at different rates depending on your insurance coverage.  Please reach out to your insurance provider with any questions.    If during the course of the call the physician/provider feels a video visit is not appropriate, you will not be charged for this service.\"    Patient has given verbal consent for Video visit? Yes  How would you like to obtain your AVS? MyChart  If you are dropped from the video visit, the video invite should be resent to: MyChart  Will anyone else be joining your video visit? No    Subjective     Arpita Zafar is a 35 year old female who presents today via video visit for the following health issues:    HPI       Video Start Time: 5:47 PM    Hurt left shoulder while at work in July.  Had worsening pain so missed work and needs note. Needed to take pain medications so didn't feel safe to drive.  Surgery coming soon.    Review of Systems   Constitutional, HEENT, cardiovascular, pulmonary, gi and gu systems are negative, except as otherwise noted.      Objective           Vitals:  No vitals were obtained today due to virtual visit.    Physical Exam     GENERAL: Healthy, alert and no distress  EYES: Eyes grossly normal to inspection.  No discharge or erythema, or obvious scleral/conjunctival abnormalities.  RESP: No audible wheeze, cough, or visible cyanosis.  " No visible retractions or increased work of breathing.    SKIN: Visible skin clear. No significant rash, abnormal pigmentation or lesions.  NEURO: Cranial nerves grossly intact.  Mentation and speech appropriate for age.  PSYCH: Mentation appears normal, affect normal/bright, judgement and insight intact, normal speech and appearance well-groomed.      Results Only on 05/14/2020   Component Date Value Ref Range Status     COVID-19 Virus PCR to [facility] - So* 05/14/2020 Nasopharyngeal   Final     COVID-19 Virus PCR to [facility] - Re* 05/14/2020 Not Detected   Final    Comment: Collection of multiple specimens from the same patient may be necessary to   detect the virus. The possibility of a false negative should be considered if   the patient's recent exposure or clinical presentation suggests 2019 nCOV   infection and diagnostic tests for other causes of illness are negative.   Repeat testing may be considered in this setting.  Viral RNA was extracted via a validated method and subsequently underwent   single step reverse transcriptase-real time polymerase chain reaction using   primers to the CDC specified N1,N2 gene targets of CoV2 and human RNP as an   internal control.  A negative result does not rule out the presence of real-time PCR inhibitors   in the specimen or COVID-19 RNA in concentrations below the limit of detection   of the assay. The possibility of a false negative should be considered if the   patients recent exposure or clinical presentation suggests COVID-19.   Additional testing or repeat testing requires consultation with the   laborator                           y.  Nasopharyngeal specimen is the preferred choice for swab-based SARS CoV2   testing. When collection of a nasopharyngeal swab is not possible the   following are acceptable alternatives:  an oropharyngeal (OP) specimen collected by a healthcare professional, or a   nasal mid-turbinate (NMT) swab collected by a healthcare professional or  by   onsite self-collection (using a flocked tapered swab), or an anterior nares   specimen collected by a healthcare professional or by onsite self-collection   (using a round foam swab). (Centers for Disease Control)  Testing performed by [facility], Room 1-210, [address] [place] [address], [place]   [address].ï   This test was developed and its   performance characteristics determined by the [facility]. It has not been cleared or approved by the FDA.  The laboratory is regulated under the Clinical Laboratory Improvement   Amendments of 1988 (CLIA-88) as qualified to perform high-complexity                            testing.   This test is used for clinical purposes. It should not be regarded as   investigational or for research.               Assessment & Plan     Avascular necrosis of bone of shoulder (H)  Work note given.  Discussed FMLA if needed          Return in about 3 days (around 10/23/2020), or if symptoms worsen or fail to improve.    Catherine Martin MD  [facility]      Video-Visit Details    Type of service:  Video Visit    Video End Time:5:51 PM    Originating Location (pt. Location): Home    Distant Location (provider location):  [facility]     Platform used for Video Visit: [employer]

## 2020-10-20 NOTE — PATIENT INSTRUCTIONS
At [facility], we strive to deliver an exceptional experience to you, every time we see you. If you receive a survey, please complete it as we do value your feedback.  If you have MyChart, you can expect to receive results automatically within 24 hours of their completion.  Your provider will send a note interpreting your results as well.   If you do not have MyChart, you should receive your results in about a week by mail.    Your care team:                            Family Medicine Internal Medicine   MD Jack Muñoz MD Shantel Branch-Fleming, MD Srinivasa Vaka, MD Katya Georgiev PA-C Megan Hill, APRN CNP    Perez Ho, MD Pediatrics   Josue Cain, PAJonnieC  Sanjuana Kendall, CNP MD Arlin London APRN CNP   MD Deonna Simmons MD Deborah Mielke, MD Lety Lopez, APRN CNP  Jeanne Mercer, PAJonnieC  Eli Reyes, CNP  MD Briana Watt MD Angela Wermerskirchen, MD      Clinic hours: Monday - Thursday 7 am-7 pm; Fridays 7 am-5 pm.   Urgent care: Monday - Friday 11 am-9 pm; Saturday and Sunday 9 am-5 pm.    Clinic: [phone]       [facility]: Monday - Thursday 8 am - 7 pm; Friday 8 am - 6 pm  [facility]: [phone]     Use www.[email or web address] for 24/7 diagnosis and treatment of dozens of conditions.

## 2020-10-20 NOTE — LETTER
[address] [address]  Phone: [phone]    October 20, 2020        Arpita Zafar  [address] [facility] [address]          To whom it may concern:    RE: Arpita Zafar    Patient was seen and treated today at our clinic and missed work 10/20/2020.    Please contact me for questions or concerns.      Sincerely,    Catherine Martin MD

## 2020-11-08 ENCOUNTER — E-VISIT (OUTPATIENT)
Dept: URGENT CARE | Facility: URGENT CARE | Age: 35
End: 2020-11-08
Payer: OTHER MISCELLANEOUS

## 2020-11-08 DIAGNOSIS — M25.519 CHRONIC SHOULDER PAIN, UNSPECIFIED LATERALITY: Primary | ICD-10-CM

## 2020-11-08 DIAGNOSIS — G89.29 CHRONIC SHOULDER PAIN, UNSPECIFIED LATERALITY: Primary | ICD-10-CM

## 2020-11-08 PROCEDURE — 99421 OL DIG E/M SVC 5-10 MIN: CPT | Performed by: EMERGENCY MEDICINE

## 2020-11-08 NOTE — PATIENT INSTRUCTIONS
Thank you for choosing us for your care. I think an in-clinic visit would be best next steps based on your symptoms. Please schedule a clinic appointment; you won t be charged for this e-visit.      You can schedule an appointment right here in [employer], or call [phone]    Thank you for choosing us for your care. Based on the information provided, I believe you need to be seen immediately. Please call your PCP at [facility] in the morning.Thank you for choosing us for your care. I think an in-clinic visit would be best next steps based on your symptoms. Please schedule a clinic appointment; you won t be charged for this e-visit.      You can schedule an appointment right here in [employer], or call [phone]    Thank you for choosing us for your care.    We can not use this service for writing work excuses without you being seen.

## 2020-11-09 ENCOUNTER — VIRTUAL VISIT (OUTPATIENT)
Dept: FAMILY MEDICINE | Facility: CLINIC | Age: 35
End: 2020-11-09

## 2020-11-09 DIAGNOSIS — M87.019 AVASCULAR NECROSIS OF BONE OF SHOULDER (H): Primary | ICD-10-CM

## 2020-11-09 PROCEDURE — 99207 PR NO BILLABLE SERVICE THIS VISIT: CPT | Performed by: FAMILY MEDICINE

## 2020-11-09 NOTE — LETTER
November 9, 2020      Arpita Zafar  [address] [place]  [facility] [address]        To Whom It May Concern:    Arpita Zafar was seen on 11/9/2020.  Please excuse her from work 11/8/2020 due to chronic work related injury.      Sincerely,    Catherine Martin MD

## 2020-11-09 NOTE — PROGRESS NOTES
"Arpita Zafar is a 35 year old female who is being evaluated via a billable video visit.      The patient has been notified of following:     \"This video visit will be conducted via a call between you and your physician/provider. We have found that certain health care needs can be provided without the need for an in-person physical exam.  This service lets us provide the care you need with a video conversation.  If a prescription is necessary we can send it directly to your pharmacy.  If lab work is needed we can place an order for that and you can then stop by our lab to have the test done at a later time.    Video visits are billed at different rates depending on your insurance coverage.  Please reach out to your insurance provider with any questions.    If during the course of the call the physician/provider feels a video visit is not appropriate, you will not be charged for this service.\"    Patient has given verbal consent for Video visit? Yes  How would you like to obtain your AVS? MyChart  If you are dropped from the video visit, the video invite should be resent to: MyChart  Will anyone else be joining your video visit? No    Subjective     Arpita Zafar is a 35 year old female who presents today via video visit for the following health issues:    HPI       Video Start Time: 2:42 PM    Was unable to work due to pain yesterday and took pain medications.  She needs a note for yesterday.    Review of Systems   Constitutional, HEENT, cardiovascular, pulmonary, gi and gu systems are negative, except as otherwise noted.      Objective           Vitals:  No vitals were obtained today due to virtual visit.    Physical Exam     GENERAL: Healthy, alert and no distress  EYES: Eyes grossly normal to inspection.  No discharge or erythema, or obvious scleral/conjunctival abnormalities.  RESP: No audible wheeze, cough, or visible cyanosis.  No visible retractions or increased work of breathing.    SKIN: Visible " skin clear. No significant rash, abnormal pigmentation or lesions.  NEURO: Cranial nerves grossly intact.  Mentation and speech appropriate for age.  PSYCH: Mentation appears normal, affect normal/bright, judgement and insight intact, normal speech and appearance well-groomed.      Results Only on 05/14/2020   Component Date Value Ref Range Status     COVID-19 Virus PCR to [facility] - So* 05/14/2020 Nasopharyngeal   Final     COVID-19 Virus PCR to [facility] - Re* 05/14/2020 Not Detected   Final    Comment: Collection of multiple specimens from the same patient may be necessary to   detect the virus. The possibility of a false negative should be considered if   the patient's recent exposure or clinical presentation suggests 2019 nCOV   infection and diagnostic tests for other causes of illness are negative.   Repeat testing may be considered in this setting.  Viral RNA was extracted via a validated method and subsequently underwent   single step reverse transcriptase-real time polymerase chain reaction using   primers to the CDC specified N1,N2 gene targets of CoV2 and human RNP as an   internal control.  A negative result does not rule out the presence of real-time PCR inhibitors   in the specimen or COVID-19 RNA in concentrations below the limit of detection   of the assay. The possibility of a false negative should be considered if the   patients recent exposure or clinical presentation suggests COVID-19.   Additional testing or repeat testing requires consultation with the   laborator                           y.  Nasopharyngeal specimen is the preferred choice for swab-based SARS CoV2   testing. When collection of a nasopharyngeal swab is not possible the   following are acceptable alternatives:  an oropharyngeal (OP) specimen collected by a healthcare professional, or a   nasal mid-turbinate (NMT) swab collected by a healthcare professional or by   onsite self-collection (using a flocked tapered swab), or an  anterior nares   specimen collected by a healthcare professional or by onsite self-collection   (using a round foam swab). (Centers for Disease Control)  Testing performed by [facility], Room 1-210, [address] [place] [address], [place]   [address].ï   This test was developed and its   performance characteristics determined by the [facility]. It has not been cleared or approved by the FDA.  The laboratory is regulated under the Clinical Laboratory Improvement   Amendments of 1988 (CLIA-88) as qualified to perform high-complexity                            testing.   This test is used for clinical purposes. It should not be regarded as   investigational or for research.               Assessment & Plan     Avascular necrosis of bone of shoulder (H)  Needs note excusing absence yesterday.  This was completed.          Return in about 3 days (around 11/12/2020), or if symptoms worsen or fail to improve.    Catherine Martin MD  [facility]      Video-Visit Details    Type of service:  Video Visit    Video End Time:2:46 PM    Originating Location (pt. Location): Home    Distant Location (provider location):  [facility]     Platform used for Video Visit: [employer]

## 2020-11-09 NOTE — PATIENT INSTRUCTIONS
At [facility], we strive to deliver an exceptional experience to you, every time we see you. If you receive a survey, please complete it as we do value your feedback.  If you have MyChart, you can expect to receive results automatically within 24 hours of their completion.  Your provider will send a note interpreting your results as well.   If you do not have MyChart, you should receive your results in about a week by mail.    Your care team:                            Family Medicine Internal Medicine   MD Jack Muñoz MD Shantel Branch-Fleming, MD Srinivasa Vaka, MD Katya Georgiev PA-C Megan Hill, APRN CNP    Perez Ho, MD Pediatrics   Josue Cain, PAJonnieC  Sanjuana Kendall, CNP MD Arlin London APRN CNP   MD Deonna Simmons MD Deborah Mielke, MD Lety Lopez, APRN CNP  Jeanne Mercer, PAJonnieC  Eli Reyes, CNP  MD Briana Watt MD Angela Wermerskirchen, MD      Clinic hours: Monday - Thursday 7 am-7 pm; Fridays 7 am-5 pm.   Urgent care: Monday - Friday 11 am-9 pm; Saturday and Sunday 9 am-5 pm.    Clinic: [phone]       [facility]: Monday - Thursday 8 am - 7 pm; Friday 8 am - 6 pm  [facility]: [phone]     Use www.[email or web address] for 24/7 diagnosis and treatment of dozens of conditions.

## 2020-11-16 ENCOUNTER — VIRTUAL VISIT (OUTPATIENT)
Dept: FAMILY MEDICINE | Facility: CLINIC | Age: 35
End: 2020-11-16
Payer: OTHER MISCELLANEOUS

## 2020-11-16 DIAGNOSIS — M87.019 AVASCULAR NECROSIS OF BONE OF SHOULDER (H): Primary | ICD-10-CM

## 2020-11-16 PROCEDURE — 99213 OFFICE O/P EST LOW 20 MIN: CPT | Mod: 95 | Performed by: NURSE PRACTITIONER

## 2020-11-16 RX ORDER — HYDROCODONE BITARTRATE AND ACETAMINOPHEN 5; 325 MG/1; MG/1
1 TABLET ORAL EVERY 6 HOURS PRN
Qty: 10 TABLET | Refills: 0 | Status: SHIPPED | OUTPATIENT
Start: 2020-11-16 | End: 2020-11-19

## 2020-11-16 NOTE — PROGRESS NOTES
"Arpita Zafar is a 35 year old female who is being evaluated via a billable video visit.      The patient has been notified of following:     \"This video visit will be conducted via a call between you and your physician/provider. We have found that certain health care needs can be provided without the need for an in-person physical exam.  This service lets us provide the care you need with a video conversation.  If a prescription is necessary we can send it directly to your pharmacy.  If lab work is needed we can place an order for that and you can then stop by our lab to have the test done at a later time.    Video visits are billed at different rates depending on your insurance coverage.  Please reach out to your insurance provider with any questions.    If during the course of the call the physician/provider feels a video visit is not appropriate, you will not be charged for this service.\"    Patient has given verbal consent for Video visit? Yes  How would you like to obtain your AVS? MyChart  If you are dropped from the video visit, the video invite should be resent to: Text to cell phone:    Will anyone else be joining your video visit? No    Subjective     Arpita Zafar is a 35 year old female who presents today via video visit for the following health issues:    HPI   yesterday pain was severe  Which caused left thumb and pinky finger numbness.  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    Needs note for missing work today and possibly tomorrow.  She is out of Tramadol and would like something stronger for the pain as Tramadol just made her tired.  Has tolerated Norco for previous ortho issues 8-10 years ago.    Last prescription for tramadol was 10/5/2020 for 10 tablets  Prior to that had 4 tablets of T#3 in August.          Video Start Time: 4:06pm      Review of Systems   Constitutional, HEENT, cardiovascular, pulmonary, GI, " , musculoskeletal, neuro, skin, endocrine and psych systems are negative, except as otherwise noted.      Objective           Vitals:  No vitals were obtained today due to virtual visit.    Physical Exam     GENERAL: Healthy, alert and no distress  EYES: Eyes grossly normal to inspection.  No discharge or erythema, or obvious scleral/conjunctival abnormalities.  RESP: No audible wheeze, cough, or visible cyanosis.  No visible retractions or increased work of breathing.    SKIN: Visible skin clear. No significant rash, abnormal pigmentation or lesions.  NEURO: Cranial nerves grossly intact.  Mentation and speech appropriate for age.  PSYCH: Mentation appears normal, affect normal/bright, judgement and insight intact, normal speech and appearance well-groomed.            Assessment & Plan     Avascular necrosis of bone of shoulder (H)  Note written.  Sent pain medications below.  Has plan to follow up with surgeon.  Surgery is pending workman's compensation review.    - HYDROcodone-acetaminophen (NORCO) 5-325 MG tablet; Take 1 tablet by mouth every 6 hours as needed for severe pain        There are no Patient Instructions on file for this visit.    No follow-ups on file.    SALAS Singletary [facility]      Video-Visit Details    Type of service:  Video Visit    Video End Time:4:13 PM    Originating Location (pt. Location): Home    Distant Location (provider location): Provider's Home    Platform used for Video Visit: Marlene

## 2020-11-16 NOTE — LETTER
November 16, 2020      Arpita Zafar  [address]  [place] [address]        To Whom It May Concern,     Arpita Zafar was seen on 11/16/2020 .  Please excuse her from work for 11/16/2020 and 11/17/20 due to chronic work related injury.      Sincerely,        SALAS Singletary CNP

## 2020-11-29 ENCOUNTER — E-VISIT (OUTPATIENT)
Dept: URGENT CARE | Facility: URGENT CARE | Age: 35
End: 2020-11-29
Payer: COMMERCIAL

## 2020-11-29 DIAGNOSIS — Z20.822 CLOSE EXPOSURE TO 2019 NOVEL CORONAVIRUS: Primary | ICD-10-CM

## 2020-11-29 PROCEDURE — 99421 OL DIG E/M SVC 5-10 MIN: CPT | Performed by: PHYSICIAN ASSISTANT

## 2020-11-29 NOTE — PATIENT INSTRUCTIONS
Dear Arpita Zafar,    Based on your exposure to COVID-19 (coronavirus), we would like to test you for this virus. I have placed an order for this test.    For all employees or close contacts (except [place] and [employer] - see below), go to your [facility] home page and scroll down to the section that says  You have an appointment that needs to be scheduled  and click the large green button that says  Schedule Now  and follow the steps to find the next available opening.     If you are unable to complete these steps or if you cannot find any available times, please call [phone] to schedule employee testing.       [place] employees or close contacts, please call [phone].   [place] ([employer]) employees or close contacts call [phone].      If you know you have had close contact with someone who tested positive, you should be quarantined for 14 days after this exposure. You should stay in quarantine for the14 days even if the covid test is negative.     Quarantine means:  Stay home and away from others. Don't go to school or anywhere else. Generally quarantine means staying home from work but there are some exceptions to this. Please contact your workplace.  No hugging, kissing or shaking hands.  Don't let anyone visit.  Cover your mouth and nose with a mask, tissue or washcloth to avoid spreading germs.  Wash your hands and face often. Use soap and water.    What are the symptoms of COVID-19?  The most common symptoms are cough, fever and trouble breathing. Less common symptoms include headache, body aches, fatigue (feeling very tired), chills, sore throat, stuffy or runny nose, diarrhea (loose poop), loss of taste or smell, belly pain, and nausea or vomiting (feeling sick to your stomach or throwing up).  After 14 days, if you have still don't have symptoms, you likely don't have this virus.  If you develop symptoms, follow these guidelines.  If you're normally healthy: Please start another  eVisit.  If you have a serious health problem (like cancer, heart failure, an organ transplant or kidney disease): Call your specialty clinic. Let them know that you might have COVID-19.    When it's time for your COVID test:  Stay at least 6 feet away from others. (If someone will drive you to your test, stay in the backseat, as far away from the  as you can.)  Cover your mouth and nose with a mask, tissue or washcloth.  Go straight to the testing site. Don't make any stops on the way there or back.    Please note  Patients in these groups are at risk for severe illness due to COVID-19:    People 65 years and older    People who live in a nursing home or long-term care facility    People with chronic disease (lung, heart, cancer, diabetes, kidney, liver, immunologic)    People who have a weakened immune system, including those who:  o Are in cancer treatment  o Take medicine that weakens the immune system, such as corticosteroids  o Had a bone marrow or organ transplant  o Have an immune deficiency  o Have poorly controlled HIV or AIDS  o Are obese (body mass index of 40 or higher)  o Smoke regularly    Where can I get more information?  [facility] - About COVID-19: [email or web address]  CDC - What to Do If You're Sick: www.cdc.gov/coronavirus/2019-ncov/about/steps-when-sick.html  CDC - Ending Home Isolation: www.cdc.gov/coronavirus/2019-ncov/hcp/disposition-in-home-patients.html  CDC - Caring for Someone: www.cdc.gov/coronavirus/2019-ncov/if-you-are-sick/care-for-someone.html  [facility] - Interim Guidance for Hospital Discharge to Home: www.health.[facility].mn.us/diseases/coronavirus/hcp/hospdischarge.pdf  [facility] clinical trials (COVID-19 research studies): clinicalaffairs.[facility]/n-clinical-trials  Below are the COVID-19 hotlines at the [place] Department of [facility] ([facility]). Interpreters are available.  For health questions: Call 640-912-4820 or 1-166.965.1385 (7 a.m. to 7  p.m.)  For questions about schools and childcare: Call 119-176-3437 or 1-368.907.1183 (7 a.m. to 7 p.m.)

## 2020-12-01 DIAGNOSIS — Z20.822 CLOSE EXPOSURE TO 2019 NOVEL CORONAVIRUS: ICD-10-CM

## 2020-12-01 PROCEDURE — U0003 INFECTIOUS AGENT DETECTION BY NUCLEIC ACID (DNA OR RNA); SEVERE ACUTE RESPIRATORY SYNDROME CORONAVIRUS 2 (SARS-COV-2) (CORONAVIRUS DISEASE [COVID-19]), AMPLIFIED PROBE TECHNIQUE, MAKING USE OF HIGH THROUGHPUT TECHNOLOGIES AS DESCRIBED BY CMS-2020-01-R: HCPCS | Performed by: PHYSICIAN ASSISTANT

## 2020-12-02 LAB
SARS-COV-2 RNA SPEC QL NAA+PROBE: NOT DETECTED
SPECIMEN SOURCE: NORMAL

## 2020-12-09 ENCOUNTER — ANESTHESIA EVENT (OUTPATIENT)
Dept: SURGERY | Facility: AMBULATORY SURGERY CENTER | Age: 35
End: 2020-12-09

## 2020-12-10 NOTE — TELEPHONE ENCOUNTER
FUTURE VISIT INFORMATION      SURGERY INFORMATION:    Date: 1/12/21    Location:  or    Surgeon:  Steffanie Stockton MD    Anesthesia Type:  choice    Procedure: Left Shoulder Resurfacing Hemiarthroplasty    Consult: ov 9/10    RECORDS REQUESTED FROM:       Primary Care Provider: Sanjuana Kendall APRN [facility]

## 2020-12-13 DIAGNOSIS — Z11.59 ENCOUNTER FOR SCREENING FOR OTHER VIRAL DISEASES: Primary | ICD-10-CM

## 2020-12-28 ENCOUNTER — DOCUMENTATION ONLY (OUTPATIENT)
Dept: ORTHOPEDICS | Facility: CLINIC | Age: 35
End: 2020-12-28

## 2020-12-29 NOTE — PROGRESS NOTES
Completed FMLA forms and faxed to [facility] at [phone]. Submitted original to Medical Records for scanning. Alerted Pt via DocSend message.

## 2021-01-04 ENCOUNTER — VIRTUAL VISIT (OUTPATIENT)
Dept: SURGERY | Facility: CLINIC | Age: 36
End: 2021-01-04
Payer: COMMERCIAL

## 2021-01-04 ENCOUNTER — PRE VISIT (OUTPATIENT)
Dept: SURGERY | Facility: CLINIC | Age: 36
End: 2021-01-04

## 2021-01-04 VITALS — HEIGHT: 66 IN | WEIGHT: 160 LBS | BODY MASS INDEX: 25.71 KG/M2

## 2021-01-04 DIAGNOSIS — M87.019 AVASCULAR NECROSIS OF BONE OF SHOULDER (H): ICD-10-CM

## 2021-01-04 DIAGNOSIS — Z01.818 PRE-OP EVALUATION: Primary | ICD-10-CM

## 2021-01-04 PROCEDURE — 99202 OFFICE O/P NEW SF 15 MIN: CPT | Mod: 95 | Performed by: PHYSICIAN ASSISTANT

## 2021-01-04 SDOH — HEALTH STABILITY: MENTAL HEALTH: HOW MANY STANDARD DRINKS CONTAINING ALCOHOL DO YOU HAVE ON A TYPICAL DAY?: NOT ASKED

## 2021-01-04 SDOH — HEALTH STABILITY: MENTAL HEALTH: HOW OFTEN DO YOU HAVE A DRINK CONTAINING ALCOHOL?: NOT ASKED

## 2021-01-04 SDOH — HEALTH STABILITY: MENTAL HEALTH: HOW OFTEN DO YOU HAVE 6 OR MORE DRINKS ON ONE OCCASION?: NOT ASKED

## 2021-01-04 ASSESSMENT — LIFESTYLE VARIABLES: TOBACCO_USE: 1

## 2021-01-04 ASSESSMENT — MIFFLIN-ST. JEOR: SCORE: 1442.51

## 2021-01-04 ASSESSMENT — PAIN SCALES - GENERAL: PAINLEVEL: MILD PAIN (2)

## 2021-01-04 NOTE — H&P
Pre-Operative H & P         Video-Visit Details    Type of service:  Video Visit    Patient verbally consented to video service today: YES      Video Start Time: 1343  Video End Time (time video stopped): 1355    Originating Location (pt. Location): Home    Distant Location (provider location):  associated diagnosis     Mode of Communication:  Video Conference via Linkurious      CC:  Preoperative exam to assess for increased cardiopulmonary risk while undergoing surgery and anesthesia.    Date of Encounter: 1/4/2021  Primary Care Physician:  [facility]  Associated diagnosis: AVN left humeral head    HPI  Arpita Zafar is a 35 year old female who presents for pre-operative H & P in preparation for Left Shoulder Resurfacing Hemiarthroplasty with Dr. Stockton on 1/12/21 at [facility]. Patient is being evaluated for comorbid conditions of former tobacco use        Ms. 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 She is followed in orthopedics. She tried GH injection with short lived relief. She was seen by Dr. Stockton to discuss other treatment options.  The above procedure is now planned.         History is obtained from the patient and chart review.    Past Medical History  Past Medical History:   Diagnosis Date     Abnormal Pap smear of cervix 10/31/2018    See problem list     Cervical high risk HPV (human papillomavirus) test positive 8/16/2017, 10/31/18    See problem list       Past Surgical History  Past Surgical History:   Procedure Laterality Date     CONIZATION LEEP N/A 1/15/2019    Procedure: LOOP ELECTROSURGICAL EXCISION PROCEDURE WITH CONIZATION OF CERVIX;  Surgeon: Corey Blakely MD;  Location:  OR      COLP CERVIX/UPPER VAGINA W ENDOCERV CURETT  2018      BREAST BIOPSY CORE NEEDLE LEFT  2018       Hx of Blood transfusions/reactions: denies     Hx of abnormal bleeding or anti-platelet use: denies    Menstrual history: Patient's  last menstrual period was 2020 (within days).    Steroid use in the last year: denies    Personal or FH with difficulty with Anesthesia:  denies    Prior to Admission Medications  Current Outpatient Medications   Medication Sig Dispense Refill     acetaminophen (TYLENOL) 500 MG tablet Take 1,000 mg by mouth every 6 hours as needed for mild pain       ibuprofen (ADVIL/MOTRIN) 200 MG capsule Take 600-800 mg by mouth every 4 hours as needed for fever         Allergies  No Known Allergies    Social History  Social History     Socioeconomic History     Marital status: Single     Spouse name: partner- Caesar     Number of children: 0     Years of education: Not on file     Highest education level: Not on file   Occupational History     Occupation: RN     Comment: [employer]   Social Needs     Financial resource strain: Not on file     Food insecurity     Worry: Not on file     Inability: Not on file     Transportation needs     Medical: Not on file     Non-medical: Not on file   Tobacco Use     Smoking status: Former Smoker     Types: Cigarettes     Quit date: 10/31/2015     Years since quittin.1     Smokeless tobacco: Never Used   Substance and Sexual Activity     Alcohol use: Yes     Comment: occasionally wine     Drug use: No     Sexual activity: Yes     Partners: Male     Birth control/protection: None   Lifestyle     Physical activity     Days per week: Not on file     Minutes per session: Not on file     Stress: Not on file   Relationships     Social connections     Talks on phone: Not on file     Gets together: Not on file     Attends Yazidism service: Not on file     Active member of club or organization: Not on file     Attends meetings of clubs or organizations: Not on file     Relationship status: Not on file     Intimate partner violence     Fear of current or ex partner: Not on file     Emotionally abused: Not on file     Physically abused: Not on file     Forced sexual activity: Not on file  "  Other Topics Concern     Parent/sibling w/ CABG, MI or angioplasty before 65F 55M? Not Asked   Social History Narrative     Not on file       Family History  Family History   Problem Relation Age of Onset     Diabetes Father      Malignant Hyperthermia No family hx of      Anesthesia Reaction No family hx of      Deep Vein Thrombosis (DVT) No family hx of        ROS/MED HX    ENT/Pulmonary:     (+)tobacco use, Past use , . .    Neurologic:  - neg neurologic ROS     Cardiovascular:  - neg cardiovascular ROS   (+) ----. : . . . :. . No previous cardiac testing      (-) taking anticoagulants/antiplatelets   METS/Exercise Tolerance: Comment: No intentional exercise, can walk and ascend stairs without HANKINS >4 METS   Hematologic:  - neg hematologic  ROS      (-) History of Transfusion   Musculoskeletal: Comment: Avascular necrosis of left humeral head        GI/Hepatic:  - neg GI/hepatic ROS       Renal/Genitourinary:  - ROS Renal section negative       Endo:  - neg endo ROS       Psychiatric:  - neg psychiatric ROS       Infectious Disease:  - neg infectious disease ROS       Malignancy:      - no malignancy   Other:           The complete review of systems is negative other than noted in the HPI or here.       160 lbs 0 oz  5' 6\"   Body mass index is 25.82 kg/m .       Physical Exam  Constitutional: Awake, alert, cooperative, no apparent distress, and appears stated age.  Respiratory: non labored breathing  Neuropsychiatric: Calm, cooperative. Normal affect.     Please refer to the physical examination documented by the anesthesiologist in the anesthesia record on the day of surgery    Labs: (personally reviewed)  Patient will complete CBC and BMP this week- I will watch for results      Outside records reviewed from: care everywhere    ASSESSMENT and PLAN  Arpita Zafar is a 35 year old female scheduled for Left Shoulder Resurfacing Hemiarthroplasty on 1/12/21 by Dr. Stockton in treatment of AVN of left humeral " head.  PAC referral for risk assessment and optimization for anesthesia with comorbid conditions of former tobacco use:    Pre-operative considerations:  1.  Cardiac:  Functional status- METS >4. Denies cardiac symptoms. intermediate risk surgery with 0.4% (RCRI #) risk of major adverse cardiac event.   2.  Pulm: SADAF risk: low.  Former tobacco use. Denies pulmonary symptoms.  COVID testing ordered per surgery team.   3.  GI:  Risk of PONV score = 3.  If > 2, anti-emetic intervention recommended.  4.  MSK: AVN of femoral head following a work injury. The above procedure is planned.     VTE risk: 0.26%    Patient is optimized and is acceptable candidate for the proposed procedure.  No further diagnostic evaluation is needed.     **Physical exam and vital signs not completed today as this visit was scheduled as a virtual visit during Covid 19 pandemic. Physical exam should be completed the DOS in pre-op**    Svitlana Pascual PA-C  Preoperative Assessment Center  [facility]  Phone: [phone]  Fax: [phone]

## 2021-01-04 NOTE — ANESTHESIA PREPROCEDURE EVALUATION
"Anesthesia Pre-Procedure Evaluation    Patient: Arpita Zafar   MRN:     [id number] Gender:   female   Age:    35 year old :      1985        Preoperative Diagnosis: Avascular necrosis of left humeral head (H) [M87.022]   Procedure(s):  Left Shoulder Resurfacing Hemiarthroplasty     LABS:  CBC:   Lab Results   Component Value Date    WBC 5.5 2017    HGB 14.9 2017    HCT 43.5 2017     2017     BMP:   Lab Results   Component Value Date     2017    POTASSIUM 3.9 2017    CHLORIDE 103 2017    CO2 27 2017    BUN 13 2017    CR 0.83 2017    GLC 91 2017     COAGS: No results found for: PTT, INR, FIBR  POC:   Lab Results   Component Value Date    HCG Negative 2020     OTHER:   Lab Results   Component Value Date    MARY 8.9 2017    ALBUMIN 4.0 2017    PROTTOTAL 7.4 2017    ALT 22 2017    AST 9 2017    ALKPHOS 58 2017    BILITOTAL 0.6 2017    TSH 1.11 2017        Preop Vitals    BP Readings from Last 3 Encounters:   20 (!) 136/108   09/10/20 (!) 157/115   20 (!) 136/95    Pulse Readings from Last 3 Encounters:   20 80   09/10/20 87   20 80      Resp Readings from Last 3 Encounters:   20 16   20 18   01/15/19 16    SpO2 Readings from Last 3 Encounters:   20 97%   09/10/20 96%   20 98%      Temp Readings from Last 1 Encounters:   20 98.3  F (36.8  C) (Tympanic)    Ht Readings from Last 1 Encounters:   20 1.664 m (5' 5.5\")      Wt Readings from Last 1 Encounters:   20 72.2 kg (159 lb 2 oz)    Estimated body mass index is 26.08 kg/m  as calculated from the following:    Height as of 8/26/20: 1.664 m (5' 5.5\").    Weight as of 20: 72.2 kg (159 lb 2 oz).     LDA:        Past Medical History:   Diagnosis Date     Abnormal Pap smear of cervix 10/31/2018    See problem list     Cervical high risk HPV (human papillomavirus) " test positive 8/16/2017, 10/31/18    See problem list      Past Surgical History:   Procedure Laterality Date     CONIZATION LEEP N/A 1/15/2019    Procedure: LOOP ELECTROSURGICAL EXCISION PROCEDURE WITH CONIZATION OF CERVIX;  Surgeon: Corey Blakely MD;  Location: [facility] OR     HC COLP CERVIX/UPPER VAGINA W ENDOCERV CURETT  2018     US BREAST BIOPSY CORE NEEDLE LEFT  2018      No Known Allergies     Anesthesia Evaluation     . Pt has had prior anesthetic.     No history of anesthetic complications          ROS/MED HX    ENT/Pulmonary:     (+)tobacco use, Past use , . .    Neurologic:  - neg neurologic ROS     Cardiovascular:  - neg cardiovascular ROS   (+) ----. : . . . :. . No previous cardiac testing      (-) taking anticoagulants/antiplatelets   METS/Exercise Tolerance: Comment: No intentional exercise, can walk and ascend stairs without HANKINS >4 METS   Hematologic:  - neg hematologic  ROS      (-) History of Transfusion   Musculoskeletal: Comment: Avascular necrosis of left humeral head        GI/Hepatic:  - neg GI/hepatic ROS       Renal/Genitourinary:  - ROS Renal section negative       Endo:  - neg endo ROS       Psychiatric:  - neg psychiatric ROS       Infectious Disease:  - neg infectious disease ROS       Malignancy:      - no malignancy   Other:                     SARANG [facility] AN PHYSICAL EXAM    Assessment:              PONV Management: Adult Risk Factors: Female       Comments for Plan/Consent:  Case cancelled on DOS due to patient's uncontrolled blood pressure, up to diastolic of 120, both arms checked multple times, consistently above 110.  Discussed blood pressure management, patient expressed understanding.  Offered medicine consult, patient states she believes she can set up an appointment with her PCPs office in the next week.              PAC Discussion and Assessment    ASA Classification: 2  Case is suitable for: ASC  Anesthetic techniques and relevant risks discussed: PAC Recommendations anesthetic  techniques: choice.  Invasive monitoring and risk discussed:   Types:   Possibility and Risk of blood transfusion discussed:   NPO instructions given:   Additional anesthetic preparation and risks discussed:   Needs early admission to pre-op area:   Other:     PAC Resident/NP Anesthesia Assessment:  Arpita Zafar is a 35 year old female scheduled for Left Shoulder Resurfacing Hemiarthroplasty on 1/12/21 by Dr. Stockton in treatment of AVN of left humeral head.  PAC referral for risk assessment and optimization for anesthesia with comorbid conditions of former tobacco use:    Pre-operative considerations:  1.  Cardiac:  Functional status- METS >4. Denies cardiac symptoms. intermediate risk surgery with 0.4% (RCRI #) risk of major adverse cardiac event.   2.  Pulm: SADAF risk: low.  Former tobacco use. Denies pulmonary symptoms.  COVID testing ordered per surgery team.   3.  GI:  Risk of PONV score = 3.  If > 2, anti-emetic intervention recommended.  4.  MSK: AVN of femoral head following a work injury. The above procedure is planned.     VTE risk: 0.26%    Patient is optimized and is acceptable candidate for the proposed procedure.  No further diagnostic evaluation is needed.     **Physical exam and vital signs not completed today as this visit was scheduled as a virtual visit during Covid 19 pandemic. Physical exam should be completed the DOS in pre-op**    **For further details of assessment and testing please see H and P completed on same date.      Svitlana Pascual PA-C        Mid-Level Provider/Resident:   Date:   Time:     Attending Anesthesiologist Anesthesia Assessment:        Anesthesiologist:   Date:   Time:   Pass/Fail:   Disposition:     PAC Pharmacist Assessment:        Pharmacist:   Date:   Time:    Svitlana Pascual PA-C         Case cancelled on 01/12/21 secondary to uncontrolled hypertension    Margarito Phelps MD  Staff Anesthesiologist  [phone]

## 2021-01-04 NOTE — PROGRESS NOTES
Arpita Zafar is a 35 year old female who is being evaluated via a billable video visit.      How would you like to obtain your AVS? MyChart  If the video visit is dropped, the invitation should be resent by: Other e-mail: MyChart  Will anyone else be joining your video visit? No          HPI         Review of Systems       Physical Exam

## 2021-01-04 NOTE — PATIENT INSTRUCTIONS
Preparing for Your Surgery      Name:  Arpita Zafar   MRN:  [id number]   :  1985   Today's Date:  2021       Arriving for surgery:  Surgery date:  21  Arrival time:  06:30 am    Restrictions due to COVID 19:  No visitors are allowed at this time.    [employer] parking is available for anyone with mobility limitations or disabilities.  ([place]  24 hours/ 7 days a week; [facility]  7 am- 3:30 pm, Mon- Fri)    Please come to:   [facility]  [address] [place] [id number]  -  Proceed to the 5th floor to check into the Ambulatory Surgery Center.              >> There will be patient concierges on the 1st and 5th floor, for assistance or an escort, if you would like.              >> Please call [phone] with any questions.    What can I eat or drink?  -  You may eat and drink normally for up to 8 hours before your surgery.   -  You may have clear liquids until 4 hours before surgery.     Examples of clear liquids:  Water  Clear broth  Juices (apple, white grape, white cranberry  and cider) without pulp  Noncarbonated, powder based beverages  (lemonade and Jay-Aid)  Sodas (Sprite, 7-Up, ginger ale and seltzer)  Coffee or tea (without milk or cream)  Gatorade    -  No Alcohol for at least 24 hours before surgery     Which medicines can I take?    Hold Aspirin for 7 days before surgery.   Hold Multivitamins for 7 days before surgery.  Hold Supplements for 7 days before surgery.  Hold Ibuprofen (Advil, Motrin) for 1 day before surgery--unless otherwise directed by surgeon.  Hold Naproxen (Aleve) for 4 days before surgery.  -  PLEASE TAKE these medications the day of surgery:  Tylenol if needed; take all scheduled morning medications.    How do I prepare myself?  - Please take 2 showers before surgery using Scrubcare or Hibiclens soap.    Use this soap only from the neck to your toes.     Leave the soap on your skin for one minute--then rinse thoroughly.      You  may use your own shampoo and conditioner; no other hair products.   - Please remove all jewelry and body piercings.  - No lotions, deodorants or fragrance.  - No makeup or fingernail polish.   - Bring your ID and insurance card.    - All patients are required to have a Covid-19 test within 4 days of surgery/procedure.      -Patients will be contacted by the [facility] scheduling team within 1 week of surgery to make an appointment.      - Patients may call the Scheduling team at [phone] if they have not been scheduled within 4 days of  surgery.      ALL PATIENTS GOING HOME THE SAME DAY OF SURGERY ARE REQUIRED TO HAVE A RESPONSIBLE ADULT TO DRIVE AND BE IN ATTENDANCE WITH THEM FOR 24 HOURS FOLLOWING SURGERY     Questions or Concerns:    - For any questions regarding the day of surgery or your hospital stay, please contact the Pre Admission Nursing Office at [phone].       - If you have health changes between today and your surgery please call your surgeon.       For questions after surgery please call your surgeons office.

## 2021-01-08 DIAGNOSIS — Z01.818 PRE-OP EVALUATION: ICD-10-CM

## 2021-01-08 DIAGNOSIS — Z11.59 ENCOUNTER FOR SCREENING FOR OTHER VIRAL DISEASES: ICD-10-CM

## 2021-01-08 LAB
ANION GAP SERPL CALCULATED.3IONS-SCNC: 4 MMOL/L (ref 3–14)
BUN SERPL-MCNC: 10 MG/DL (ref 7–30)
CALCIUM SERPL-MCNC: 9.4 MG/DL (ref 8.5–10.1)
CHLORIDE SERPL-SCNC: 103 MMOL/L (ref 94–109)
CO2 SERPL-SCNC: 29 MMOL/L (ref 20–32)
CREAT SERPL-MCNC: 0.81 MG/DL (ref 0.52–1.04)
ERYTHROCYTE [DISTWIDTH] IN BLOOD BY AUTOMATED COUNT: 12.4 % (ref 10–15)
GFR SERPL CREATININE-BSD FRML MDRD: >90 ML/MIN/{1.73_M2}
GLUCOSE SERPL-MCNC: 109 MG/DL (ref 70–99)
HCT VFR BLD AUTO: 41.4 % (ref 35–47)
HGB BLD-MCNC: 14 G/DL (ref 11.7–15.7)
MCH RBC QN AUTO: 32.3 PG (ref 26.5–33)
MCHC RBC AUTO-ENTMCNC: 33.8 G/DL (ref 31.5–36.5)
MCV RBC AUTO: 95 FL (ref 78–100)
PLATELET # BLD AUTO: 259 10E9/L (ref 150–450)
POTASSIUM SERPL-SCNC: 4 MMOL/L (ref 3.4–5.3)
RBC # BLD AUTO: 4.34 10E12/L (ref 3.8–5.2)
SODIUM SERPL-SCNC: 136 MMOL/L (ref 133–144)
WBC # BLD AUTO: 6.1 10E9/L (ref 4–11)

## 2021-01-08 PROCEDURE — 85027 COMPLETE CBC AUTOMATED: CPT | Performed by: PHYSICIAN ASSISTANT

## 2021-01-08 PROCEDURE — 80048 BASIC METABOLIC PNL TOTAL CA: CPT | Performed by: PHYSICIAN ASSISTANT

## 2021-01-08 PROCEDURE — 36415 COLL VENOUS BLD VENIPUNCTURE: CPT | Performed by: PHYSICIAN ASSISTANT

## 2021-01-08 PROCEDURE — U0003 INFECTIOUS AGENT DETECTION BY NUCLEIC ACID (DNA OR RNA); SEVERE ACUTE RESPIRATORY SYNDROME CORONAVIRUS 2 (SARS-COV-2) (CORONAVIRUS DISEASE [COVID-19]), AMPLIFIED PROBE TECHNIQUE, MAKING USE OF HIGH THROUGHPUT TECHNOLOGIES AS DESCRIBED BY CMS-2020-01-R: HCPCS | Performed by: ORTHOPAEDIC SURGERY

## 2021-01-08 PROCEDURE — U0005 INFEC AGEN DETEC AMPLI PROBE: HCPCS | Performed by: ORTHOPAEDIC SURGERY

## 2021-01-09 LAB
SARS-COV-2 RNA RESP QL NAA+PROBE: NOT DETECTED
SPECIMEN SOURCE: NORMAL

## 2021-01-11 RX ORDER — SODIUM CHLORIDE, SODIUM LACTATE, POTASSIUM CHLORIDE, CALCIUM CHLORIDE 600; 310; 30; 20 MG/100ML; MG/100ML; MG/100ML; MG/100ML
INJECTION, SOLUTION INTRAVENOUS CONTINUOUS
Status: CANCELLED | OUTPATIENT
Start: 2021-01-11

## 2021-01-11 RX ORDER — NALOXONE HYDROCHLORIDE 0.4 MG/ML
0.2 INJECTION, SOLUTION INTRAMUSCULAR; INTRAVENOUS; SUBCUTANEOUS
Status: CANCELLED | OUTPATIENT
Start: 2021-01-11 | End: 2021-01-12

## 2021-01-11 RX ORDER — NALOXONE HYDROCHLORIDE 0.4 MG/ML
0.4 INJECTION, SOLUTION INTRAMUSCULAR; INTRAVENOUS; SUBCUTANEOUS
Status: DISCONTINUED | OUTPATIENT
Start: 2021-01-11 | End: 2021-01-13 | Stop reason: HOSPADM

## 2021-01-11 RX ORDER — ONDANSETRON 2 MG/ML
4 INJECTION INTRAMUSCULAR; INTRAVENOUS EVERY 30 MIN PRN
Status: CANCELLED | OUTPATIENT
Start: 2021-01-11

## 2021-01-11 RX ORDER — MEPERIDINE HYDROCHLORIDE 25 MG/ML
12.5 INJECTION INTRAMUSCULAR; INTRAVENOUS; SUBCUTANEOUS
Status: CANCELLED | OUTPATIENT
Start: 2021-01-11

## 2021-01-11 RX ORDER — ONDANSETRON 4 MG/1
4 TABLET, ORALLY DISINTEGRATING ORAL EVERY 30 MIN PRN
Status: CANCELLED | OUTPATIENT
Start: 2021-01-11

## 2021-01-11 RX ORDER — NALOXONE HYDROCHLORIDE 0.4 MG/ML
0.2 INJECTION, SOLUTION INTRAMUSCULAR; INTRAVENOUS; SUBCUTANEOUS
Status: DISCONTINUED | OUTPATIENT
Start: 2021-01-11 | End: 2021-01-13 | Stop reason: HOSPADM

## 2021-01-11 RX ORDER — NALOXONE HYDROCHLORIDE 0.4 MG/ML
0.4 INJECTION, SOLUTION INTRAMUSCULAR; INTRAVENOUS; SUBCUTANEOUS
Status: CANCELLED | OUTPATIENT
Start: 2021-01-11 | End: 2021-01-12

## 2021-01-12 ENCOUNTER — HOSPITAL ENCOUNTER (OUTPATIENT)
Facility: AMBULATORY SURGERY CENTER | Age: 36
End: 2021-01-12
Attending: ORTHOPAEDIC SURGERY
Payer: COMMERCIAL

## 2021-01-12 ENCOUNTER — ANESTHESIA (OUTPATIENT)
Dept: SURGERY | Facility: AMBULATORY SURGERY CENTER | Age: 36
End: 2021-01-12

## 2021-01-12 VITALS
SYSTOLIC BLOOD PRESSURE: 143 MMHG | TEMPERATURE: 97.8 F | RESPIRATION RATE: 12 BRPM | HEART RATE: 73 BPM | OXYGEN SATURATION: 98 % | DIASTOLIC BLOOD PRESSURE: 117 MMHG

## 2021-01-12 DIAGNOSIS — M87.019 AVASCULAR NECROSIS OF BONE OF SHOULDER (H): Primary | ICD-10-CM

## 2021-01-12 LAB
HCG UR QL: NEGATIVE
INTERNAL QC OK POCT: YES

## 2021-01-12 PROCEDURE — 81025 URINE PREGNANCY TEST: CPT | Performed by: PATHOLOGY

## 2021-01-12 RX ORDER — GABAPENTIN 300 MG/1
300 CAPSULE ORAL ONCE
Status: COMPLETED | OUTPATIENT
Start: 2021-01-12 | End: 2021-01-12

## 2021-01-12 RX ORDER — FENTANYL CITRATE 50 UG/ML
25-50 INJECTION, SOLUTION INTRAMUSCULAR; INTRAVENOUS
Status: DISCONTINUED | OUTPATIENT
Start: 2021-01-12 | End: 2021-01-13 | Stop reason: HOSPADM

## 2021-01-12 RX ORDER — CEFAZOLIN SODIUM 2 G/50ML
2 SOLUTION INTRAVENOUS
Status: DISCONTINUED | OUTPATIENT
Start: 2021-01-12 | End: 2021-01-13 | Stop reason: HOSPADM

## 2021-01-12 RX ORDER — FLUMAZENIL 0.1 MG/ML
0.2 INJECTION, SOLUTION INTRAVENOUS
Status: DISCONTINUED | OUTPATIENT
Start: 2021-01-12 | End: 2021-01-13 | Stop reason: HOSPADM

## 2021-01-12 RX ORDER — CEFAZOLIN SODIUM 1 G/50ML
1 SOLUTION INTRAVENOUS SEE ADMIN INSTRUCTIONS
Status: DISCONTINUED | OUTPATIENT
Start: 2021-01-12 | End: 2021-01-13 | Stop reason: HOSPADM

## 2021-01-12 RX ORDER — SODIUM CHLORIDE, SODIUM LACTATE, POTASSIUM CHLORIDE, CALCIUM CHLORIDE 600; 310; 30; 20 MG/100ML; MG/100ML; MG/100ML; MG/100ML
INJECTION, SOLUTION INTRAVENOUS CONTINUOUS
Status: DISCONTINUED | OUTPATIENT
Start: 2021-01-12 | End: 2021-01-13 | Stop reason: HOSPADM

## 2021-01-12 RX ORDER — ACETAMINOPHEN 325 MG/1
975 TABLET ORAL ONCE
Status: COMPLETED | OUTPATIENT
Start: 2021-01-12 | End: 2021-01-12

## 2021-01-12 RX ORDER — SCOLOPAMINE TRANSDERMAL SYSTEM 1 MG/1
1 PATCH, EXTENDED RELEASE TRANSDERMAL
Status: DISCONTINUED | OUTPATIENT
Start: 2021-01-12 | End: 2021-01-13 | Stop reason: HOSPADM

## 2021-01-12 RX ADMIN — ACETAMINOPHEN 975 MG: 325 TABLET ORAL at 07:11

## 2021-01-12 RX ADMIN — GABAPENTIN 300 MG: 300 CAPSULE ORAL at 07:11

## 2021-01-12 RX ADMIN — SODIUM CHLORIDE, SODIUM LACTATE, POTASSIUM CHLORIDE, CALCIUM CHLORIDE: 600; 310; 30; 20 INJECTION, SOLUTION INTRAVENOUS at 07:32

## 2021-01-12 NOTE — OR NURSING
Patient cancelled per Dr. Phelps due to high diastolic blood pressure. Patient advised to follow up with her PCP today.

## 2021-01-13 ENCOUNTER — OFFICE VISIT (OUTPATIENT)
Dept: FAMILY MEDICINE | Facility: CLINIC | Age: 36
End: 2021-01-13
Payer: COMMERCIAL

## 2021-01-13 VITALS
SYSTOLIC BLOOD PRESSURE: 142 MMHG | DIASTOLIC BLOOD PRESSURE: 102 MMHG | HEART RATE: 63 BPM | HEIGHT: 66 IN | OXYGEN SATURATION: 99 % | BODY MASS INDEX: 25.71 KG/M2 | TEMPERATURE: 97.6 F | WEIGHT: 160 LBS

## 2021-01-13 DIAGNOSIS — I10 HYPERTENSION, UNSPECIFIED TYPE: Primary | ICD-10-CM

## 2021-01-13 PROCEDURE — 93000 ELECTROCARDIOGRAM COMPLETE: CPT | Performed by: NURSE PRACTITIONER

## 2021-01-13 PROCEDURE — 99214 OFFICE O/P EST MOD 30 MIN: CPT | Performed by: NURSE PRACTITIONER

## 2021-01-13 RX ORDER — HYDROCHLOROTHIAZIDE 25 MG/1
25 TABLET ORAL DAILY
Qty: 30 TABLET | Refills: 1 | Status: SHIPPED | OUTPATIENT
Start: 2021-01-13 | End: 2021-03-05

## 2021-01-13 ASSESSMENT — PAIN SCALES - GENERAL: PAINLEVEL: MILD PAIN (2)

## 2021-01-13 ASSESSMENT — MIFFLIN-ST. JEOR: SCORE: 1442.51

## 2021-01-13 NOTE — PATIENT INSTRUCTIONS
EKG looked good    Start hydrochlorothiazide daily.  Follow up for repeat BMP in one week and message me with your blood pressures in 1 week as well.  Likely after that if blood pressure is normal, would be safe to do surgery in 2-3 weeks from now.    Plan for renal ultrasound after your surgery.

## 2021-01-13 NOTE — PROGRESS NOTES
Assessment & Plan     Hypertension, unspecified type  Will start medication given elevated readings consistently for 6 months.   - Home Blood Pressure Monitor Order for DME - ONLY FOR DME  - hydrochlorothiazide (HYDRODIURIL) 25 MG tablet; Take 1 tablet (25 mg) by mouth daily  - **Basic metabolic panel FUTURE anytime; Future  - EKG 12-lead complete w/read - Clinics  - US Renal Complete w Doppler Complete; Future    Review of the result(s) of each unique test - labs from 1/9/21    20 minutes spent on the date of the encounter doing chart review, history and exam, documentation and further activities as noted above           Patient Instructions   EKG looked good    Start hydrochlorothiazide daily.  Follow up for repeat BMP in one week and message me with your blood pressures in 1 week as well.  Likely after that if blood pressure is normal, would be safe to do surgery in 2-3 weeks from now.    Plan for renal ultrasound after your surgery.      Return in about 1 week (around 1/20/2021) for Follow up.    [facility] 35 year [facility] who presents to clinic today for the following health issues     HPI       Hypertension  Patient complains of injuring left shoulder in August, and onset of high blood pressure. Denies any symptoms of dizziness, or weakness.       Do you check your blood pressure regularly outside of the clinic? No     Are you following a low salt diet? No    Are your blood pressures ever more than 140 on the top number (systolic) OR more   than 90 on the bottom number (diastolic), for example 140/90? Yes      How many servings of fruits and vegetables do you eat daily?  2-3    On average, how many sweetened beverages do you drink each day (Examples: soda, juice, sweet tea, etc.  Do NOT count diet or artificially sweetened beverages)?   0    How many days per week do you exercise enough to make your heart beat faster? 3 or  "less    How many minutes a day do you exercise enough to make your heart beat faster? 9 or less    How many days per week do you miss taking your medication? 0    BP Readings from Last 6 Encounters:   01/13/21 (!) 142/102   01/12/21 (!) 143/117   09/27/20 (!) 136/108   09/10/20 (!) 157/115   08/13/20 (!) 136/95   07/29/20 128/76         Review of Systems   Constitutional, HEENT, cardiovascular, pulmonary, GI, , musculoskeletal, neuro, skin, endocrine and psych systems are negative, except as otherwise noted.      Objective    BP (!) 142/102   Pulse 63   Temp 97.6  F (36.4  C) (Oral)   Ht 1.676 m (5' 6\")   Wt 72.6 kg (160 lb)   SpO2 99%   BMI 25.82 kg/m    Body mass index is 25.82 kg/m .  Physical Exam   GENERAL: healthy, alert and no distress  NECK: no adenopathy, no asymmetry, masses, or scars and thyroid normal to palpation  RESP: lungs clear to auscultation - no rales, rhonchi or wheezes  CV: regular rate and rhythm, normal S1 S2, no S3 or S4, no murmur, click or rub, no peripheral edema and peripheral pulses strong  ABDOMEN: soft, nontender, no hepatosplenomegaly, no masses and bowel sounds normal  MS: no gross musculoskeletal defects noted, no edema  PSYCH: mentation appears normal, affect normal/bright    EKG - Reviewed and interpreted by me appears normal, NSR, normal axis, normal intervals, no acute ST/T changes c/w ischemia, no LVH by voltage criteria, unchanged from previous tracings            "

## 2021-01-14 ENCOUNTER — DOCUMENTATION ONLY (OUTPATIENT)
Dept: ORTHOPEDICS | Facility: CLINIC | Age: 36
End: 2021-01-14

## 2021-01-14 NOTE — PROGRESS NOTES
Completed Request for Information and faxed to [facility] at [phone]. Submitted original to Medical Records for scanning.

## 2021-01-20 ENCOUNTER — MYC MEDICAL ADVICE (OUTPATIENT)
Dept: FAMILY MEDICINE | Facility: CLINIC | Age: 36
End: 2021-01-20

## 2021-01-20 DIAGNOSIS — I10 HYPERTENSION, UNSPECIFIED TYPE: Primary | ICD-10-CM

## 2021-01-20 RX ORDER — LISINOPRIL 20 MG/1
20 TABLET ORAL DAILY
Qty: 30 TABLET | Refills: 1 | Status: SHIPPED | OUTPATIENT
Start: 2021-01-20 | End: 2021-03-22

## 2021-01-22 ENCOUNTER — VIRTUAL VISIT (OUTPATIENT)
Dept: FAMILY MEDICINE | Facility: CLINIC | Age: 36
End: 2021-01-22
Payer: COMMERCIAL

## 2021-01-22 VITALS — SYSTOLIC BLOOD PRESSURE: 118 MMHG | DIASTOLIC BLOOD PRESSURE: 72 MMHG

## 2021-01-22 DIAGNOSIS — I10 HYPERTENSION, UNSPECIFIED TYPE: Primary | ICD-10-CM

## 2021-01-22 DIAGNOSIS — I10 HYPERTENSION, UNSPECIFIED TYPE: ICD-10-CM

## 2021-01-22 LAB
ANION GAP SERPL CALCULATED.3IONS-SCNC: 1 MMOL/L (ref 3–14)
BUN SERPL-MCNC: 15 MG/DL (ref 7–30)
CALCIUM SERPL-MCNC: 9.5 MG/DL (ref 8.5–10.1)
CHLORIDE SERPL-SCNC: 103 MMOL/L (ref 94–109)
CO2 SERPL-SCNC: 34 MMOL/L (ref 20–32)
CREAT SERPL-MCNC: 0.71 MG/DL (ref 0.52–1.04)
GFR SERPL CREATININE-BSD FRML MDRD: >90 ML/MIN/{1.73_M2}
GLUCOSE SERPL-MCNC: 83 MG/DL (ref 70–99)
POTASSIUM SERPL-SCNC: 4.2 MMOL/L (ref 3.4–5.3)
SODIUM SERPL-SCNC: 138 MMOL/L (ref 133–144)

## 2021-01-22 PROCEDURE — 99213 OFFICE O/P EST LOW 20 MIN: CPT | Mod: 95 | Performed by: NURSE PRACTITIONER

## 2021-01-22 PROCEDURE — 36415 COLL VENOUS BLD VENIPUNCTURE: CPT | Performed by: NURSE PRACTITIONER

## 2021-01-22 PROCEDURE — 80048 BASIC METABOLIC PNL TOTAL CA: CPT | Performed by: NURSE PRACTITIONER

## 2021-01-22 NOTE — Clinical Note
Hello there, this patient's blood pressures are now well-controlled on her current medications.  I would be ok with her having surgery now.  Let me know I should addend this note and make it a preop. Thanks!  Sanjuana

## 2021-01-22 NOTE — PROGRESS NOTES
Arpita is a 35 year old who is being evaluated via a billable video visit.      How would you like to obtain your AVS? MyChart  If the video visit is dropped, the invitation should be resent by: Text to cell phone: [phone]  Will anyone else be joining your video visit? No    Video Start Time: 11:41 AM  Assessment & Plan     Hypertension, unspecified type  Now well controlled on lisinopril.  Ok to trial being off of hydrochlorothiazide for a few days with monitoring of blood pressures at home.  May only need the one agent.  Will contact surgeon and give ok for surgery.    Review of the result(s) of each unique test - bmp        6 minutes spent on the date of the encounter doing chart review, review of test results, patient visit and documentation            There are no Patient Instructions on file for this visit.    No follow-ups on file.    SALAS Singletary CNP  [facility]    Jerri Palm is a 35 year old who presents to clinic today for the following health issues     HPI       Hypertension Follow-up      Do you check your blood pressure regularly outside of the clinic? Yes     Are you following a low salt diet? Yes    Are your blood pressures ever more than 140 on the top number (systolic) OR more   than 90 on the bottom number (diastolic), for example 140/90? Yes      How many servings of fruits and vegetables do you eat daily?  2-3    On average, how many sweetened beverages do you drink each day (Examples: soda, juice, sweet tea, etc.  Do NOT count diet or artificially sweetened beverages)?   0    How many days per week do you exercise enough to make your heart beat faster? 3 or less    How many minutes a day do you exercise enough to make your heart beat faster? 9 or less    How many days per week do you miss taking your medication? 0        Review of Systems   Constitutional, HEENT, cardiovascular, pulmonary, gi and gu systems are negative, except as  otherwise noted.      Objective           Vitals:  No vitals were obtained today due to virtual visit.    Physical Exam   GENERAL: Healthy, alert and no distress  EYES: Eyes grossly normal to inspection.  No discharge or erythema, or obvious scleral/conjunctival abnormalities.  RESP: No audible wheeze, cough, or visible cyanosis.  No visible retractions or increased work of breathing.    SKIN: Visible skin clear. No significant rash, abnormal pigmentation or lesions.  NEURO: Cranial nerves grossly intact.  Mentation and speech appropriate for age.  PSYCH: Mentation appears normal, affect normal/bright, judgement and insight intact, normal speech and appearance well-groomed.    Orders Only on 01/22/2021   Component Date Value Ref Range Status     Sodium 01/22/2021 138  133 - 144 mmol/L Final     Potassium 01/22/2021 4.2  3.4 - 5.3 mmol/L Final     Chloride 01/22/2021 103  94 - 109 mmol/L Final     Carbon Dioxide 01/22/2021 34* 20 - 32 mmol/L Final     Anion Gap 01/22/2021 1* 3 - 14 mmol/L Final     Glucose 01/22/2021 83  70 - 99 mg/dL Final     Urea Nitrogen 01/22/2021 15  7 - 30 mg/dL Final     Creatinine 01/22/2021 0.71  0.52 - 1.04 mg/dL Final     GFR Estimate 01/22/2021 >90  >60 mL/min/[1.73_m2] Final    Comment: Non  GFR Calc  Starting 12/18/2018, serum creatinine based estimated GFR (eGFR) will be   calculated using the Chronic Kidney Disease Epidemiology Collaboration   (CKD-EPI) equation.       GFR Estimate If Black 01/22/2021 >90  >60 mL/min/[1.73_m2] Final    Comment:  GFR Calc  Starting 12/18/2018, serum creatinine based estimated GFR (eGFR) will be   calculated using the Chronic Kidney Disease Epidemiology Collaboration   (CKD-EPI) equation.       Calcium 01/22/2021 9.5  8.5 - 10.1 mg/dL Final             Video-Visit Details    Type of service:  Video Visit    Video End Time:11:51 AM    Originating Location (pt. Location): Home    Distant Location (provider location):    [facility]     Platform used for Video Visit: Marlene

## 2021-02-03 ENCOUNTER — MYC MEDICAL ADVICE (OUTPATIENT)
Dept: SURGERY | Facility: AMBULATORY SURGERY CENTER | Age: 36
End: 2021-02-03

## 2021-02-22 ENCOUNTER — PATIENT OUTREACH (OUTPATIENT)
Dept: FAMILY MEDICINE | Facility: CLINIC | Age: 36
End: 2021-02-22

## 2021-02-22 DIAGNOSIS — R87.611 ATYPICAL SQUAMOUS CELLS CANNOT EXCLUDE HIGH GRADE SQUAMOUS INTRAEPITHELIAL LESION ON CYTOLOGIC SMEAR OF CERVIX (ASC-H): ICD-10-CM

## 2021-02-22 DIAGNOSIS — Z11.59 ENCOUNTER FOR SCREENING FOR OTHER VIRAL DISEASES: ICD-10-CM

## 2021-03-05 ENCOUNTER — OFFICE VISIT (OUTPATIENT)
Dept: FAMILY MEDICINE | Facility: CLINIC | Age: 36
End: 2021-03-05
Payer: COMMERCIAL

## 2021-03-05 VITALS
HEIGHT: 66 IN | HEART RATE: 78 BPM | WEIGHT: 159 LBS | DIASTOLIC BLOOD PRESSURE: 81 MMHG | OXYGEN SATURATION: 97 % | SYSTOLIC BLOOD PRESSURE: 128 MMHG | BODY MASS INDEX: 25.55 KG/M2

## 2021-03-05 DIAGNOSIS — Z01.818 PREOP GENERAL PHYSICAL EXAM: Primary | ICD-10-CM

## 2021-03-05 DIAGNOSIS — M87.019 AVASCULAR NECROSIS OF BONE OF SHOULDER (H): ICD-10-CM

## 2021-03-05 DIAGNOSIS — Z11.59 ENCOUNTER FOR SCREENING FOR OTHER VIRAL DISEASES: ICD-10-CM

## 2021-03-05 DIAGNOSIS — I10 HYPERTENSION, UNSPECIFIED TYPE: ICD-10-CM

## 2021-03-05 LAB
ANION GAP SERPL CALCULATED.3IONS-SCNC: 5 MMOL/L (ref 3–14)
BUN SERPL-MCNC: 15 MG/DL (ref 7–30)
CALCIUM SERPL-MCNC: 9.4 MG/DL (ref 8.5–10.1)
CHLORIDE SERPL-SCNC: 103 MMOL/L (ref 94–109)
CO2 SERPL-SCNC: 28 MMOL/L (ref 20–32)
CREAT SERPL-MCNC: 0.78 MG/DL (ref 0.52–1.04)
GFR SERPL CREATININE-BSD FRML MDRD: >90 ML/MIN/{1.73_M2}
GLUCOSE SERPL-MCNC: 84 MG/DL (ref 70–99)
POTASSIUM SERPL-SCNC: 4.1 MMOL/L (ref 3.4–5.3)
SARS-COV-2 RNA RESP QL NAA+PROBE: NORMAL
SODIUM SERPL-SCNC: 136 MMOL/L (ref 133–144)
SPECIMEN SOURCE: NORMAL

## 2021-03-05 PROCEDURE — 99207 PR NO CHARGE LOS: CPT

## 2021-03-05 PROCEDURE — 99214 OFFICE O/P EST MOD 30 MIN: CPT | Performed by: PREVENTIVE MEDICINE

## 2021-03-05 PROCEDURE — 87635 SARS-COV-2 COVID-19 AMP PRB: CPT | Performed by: ORTHOPAEDIC SURGERY

## 2021-03-05 PROCEDURE — 80048 BASIC METABOLIC PNL TOTAL CA: CPT | Performed by: PREVENTIVE MEDICINE

## 2021-03-05 PROCEDURE — 36415 COLL VENOUS BLD VENIPUNCTURE: CPT | Performed by: PREVENTIVE MEDICINE

## 2021-03-05 ASSESSMENT — PAIN SCALES - GENERAL: PAINLEVEL: NO PAIN (0)

## 2021-03-05 ASSESSMENT — MIFFLIN-ST. JEOR: SCORE: 1432.97

## 2021-03-05 NOTE — PROGRESS NOTES
[address] [address]  Phone: [phone]  Primary Provider: Cyrus Southwell [facility]  Pre-op Performing Provider: AN BURNHAM      PREOPERATIVE EVALUATION:  Today's date: 3/5/2021    Arpita Zafar is a 36 year old female who presents for a preoperative evaluation.    Surgical Information:  Surgery/Procedure: Left Shoulder Resurfacing hemiarthroplasty   Surgery Location: [facility]  Surgeon: Dr. Stockton  Surgery Date: 3/9/21  Time of Surgery: 8am  Where patient plans to recover: At home with family  Fax number for surgical facility: Note does not need to be faxed, will be available electronically in Epic.    Type of Anesthesia Anticipated: Choice     Assessment & Plan     The proposed surgical procedure is considered INTERMEDIATE risk.    Preop general physical exam  -procedure scheduled on 3/9/21  - Basic metabolic panel    Avascular necrosis of bone of shoulder (H)  - Basic metabolic panel    Hypertension, unspecified type  -well controlled  - Basic metabolic panel      Risks and Recommendations:  The patient has the following additional risks and recommendations for perioperative complications:   - No identified additional risk factors other than previously addressed    Medication Instructions:  Patient is to take all scheduled medications on the day of surgery    RECOMMENDATION:  APPROVAL GIVEN to proceed with proposed procedure, without further diagnostic evaluation.      25 minutes spent on the date of the encounter doing chart review, history and exam, documentation and further activities as noted above        Subjective      HPI related to upcoming procedure: 35 year old female who presents for pre-operative exam for Left Shoulder Resurfacing Hemiarthroplasty, procedure had been rescheduled.     Patient has a history of AVN of her left humeral head. She is followed in orthopedics. She tried GH injection  with inadequate relief.     Preop Questions 3/5/2021   1. Have you ever had a heart attack or stroke? No   2. Have you ever had surgery on your heart or blood vessels, such as a stent placement, a coronary artery bypass, or surgery on an artery in your head, neck, heart, or legs? No   3. Do you have chest pain with activity? No   4. Do you have a history of  heart failure? No   5. Do you currently have a cold, bronchitis or symptoms of other infection? No   6. Do you have a cough, shortness of breath, or wheezing? No   7. Do you or anyone in your family have previous history of blood clots? No   8. Do you or does anyone in your family have a serious bleeding problem such as prolonged bleeding following surgeries or cuts? No   9. Have you ever had problems with anemia or been told to take iron pills? No   10. Have you had any abnormal blood loss such as black, tarry or bloody stools, or abnormal vaginal bleeding? No   11. Have you ever had a blood transfusion? No   12. Are you willing to have a blood transfusion if it is medically needed before, during, or after your surgery? Yes   13. Have you or any of your relatives ever had problems with anesthesia? No   14. Do you have sleep apnea, excessive snoring or daytime drowsiness? No   15. Do you have any artifical heart valves or other implanted medical devices like a pacemaker, defibrillator, or continuous glucose monitor? No   16. Do you have artificial joints? No   17. Are you allergic to latex? No   18. Is there any chance that you may be pregnant? No       Health Care Directive:  Patient does not have a Health Care Directive or Living Will: Discussed advance care planning with patient; however, patient declined at this time.    Preoperative Review of : MN  not reviewed   6}    Status of Chronic Conditions:  HYPERTENSION - Patient has longstanding history of HTN , currently denies any symptoms referable to elevated blood pressure. Specifically denies chest  pain, palpitations, dyspnea, orthopnea, PND or peripheral edema. Blood pressure readings have been in normal range. Current medication regimen is as listed below. Patient denies any side effects of medication.       Review of Systems  CONSTITUTIONAL: NEGATIVE for fever, chills, change in weight  INTEGUMENTARY/SKIN: NEGATIVE for worrisome rashes, moles or lesions  EYES: NEGATIVE for vision changes or irritation  ENT/MOUTH: NEGATIVE for ear, mouth and throat problems  RESP: NEGATIVE for significant cough or SOB  BREAST: NEGATIVE for masses, tenderness or discharge  CV: NEGATIVE for chest pain, palpitations or peripheral edema  GI: NEGATIVE for nausea, abdominal pain, heartburn, or change in bowel habits  : NEGATIVE for frequency, dysuria, or hematuria  MUSCULOSKELETAL: NEGATIVE for significant arthralgias or myalgia  NEURO: NEGATIVE for weakness, dizziness or paresthesias  ENDOCRINE: NEGATIVE for temperature intolerance, skin/hair changes  HEME: NEGATIVE for bleeding problems  PSYCHIATRIC: NEGATIVE for changes in mood or affect    Patient Active Problem List    Diagnosis Date Noted     Hypertension, unspecified type 01/22/2021     Priority: Medium     Avascular necrosis of bone of shoulder (H) 10/06/2020     Priority: Medium     Atypical squamous cells cannot exclude high grade squamous intraepithelial lesion on cytologic smear of cervix (ASC-H) 11/06/2018     Priority: Medium     8/16/17 NIL pap, + HR HPV (not 16 or 18). Plan: cotest in 1 year.  10/31/18 ASC-H pap, + HR HPV (not 16 or 18). Plan colp.  11/15/18 [place]: ESTELA 2, Pap: ASC-H. Plan: LEEP  1/15/19 LEEP bx: ESTELA 2 with negative margins. Plan: review at f/u visit.   2/27/19 F/U visit: Plan: cotest at 12 and 24 mo.   3/5/20 NIL pap, neg HPV. Plan: cotest in 1 year  2/22/21 Reminder MyChart       Cervical high risk HPV (human papillomavirus) test positive 08/16/2017     Priority: Medium      Past Medical History:   Diagnosis Date     Abnormal Pap smear of cervix  "10/31/2018    See problem list     Cervical high risk HPV (human papillomavirus) test positive 2017, 10/31/18    See problem list     Past Surgical History:   Procedure Laterality Date     CONIZATION LEEP N/A 1/15/2019    Procedure: LOOP ELECTROSURGICAL EXCISION PROCEDURE WITH CONIZATION OF CERVIX;  Surgeon: Corey Blakely MD;  Location: [facility] OR     HC COLP CERVIX/UPPER VAGINA W ENDOCERV CURETT  2018     US BREAST BIOPSY CORE NEEDLE LEFT  2018     Current Outpatient Medications   Medication Sig Dispense Refill     acetaminophen (TYLENOL) 500 MG tablet Take 1,000 mg by mouth every 6 hours as needed for mild pain       lisinopril (ZESTRIL) 20 MG tablet Take 1 tablet (20 mg) by mouth daily 30 tablet 1       No Known Allergies     Social History     Tobacco Use     Smoking status: Former Smoker     Types: Cigarettes     Quit date: 10/31/2015     Years since quittin.3     Smokeless tobacco: Never Used   Substance Use Topics     Alcohol use: Yes     Comment: occasionally wine     Family History   Problem Relation Age of Onset     Diabetes Father      Hyperlipidemia Father      Malignant Hyperthermia No family hx of      Anesthesia Reaction No family hx of      Deep Vein Thrombosis (DVT) No family hx of      History   Drug Use No         Objective     /81 (BP Location: Left arm, Patient Position: Chair, Cuff Size: Adult Regular)   Pulse 78   Ht 1.676 m (5' 6\")   Wt 72.1 kg (159 lb)   LMP 2021 (Exact Date)   SpO2 97%   Breastfeeding No   BMI 25.66 kg/m      Physical Exam  GENERAL APPEARANCE: healthy, alert and no distress  EYES: Eyes grossly normal to inspection and conjunctivae and sclerae normal  NECK: no adenopathy and trachea midline and normal to palpation, no carotid bruits   RESP: lungs clear to auscultation - no rales, rhonchi or wheezes  CV: regular rates and rhythm, normal S1 S2, no S3 or S4 and no murmur, click or rub  ABDOMEN: soft, non-tender and no rebound or guarding   MS: " extremities normal- no gross deformities noted and peripheral pulses normal  SKIN: no suspicious lesions or rashes  NEURO: Normal strength and tone, mentation intact and speech normal  PSYCH: mentation appears normal      Recent Labs   Lab Test 01/22/21  0959 01/08/21  1317   HGB  --  14.0   PLT  --  259    136   POTASSIUM 4.2 4.0   CR 0.71 0.81        Diagnostics:  Labs pending at this time.  Results will be reviewed when available.   No EKG required, no history of coronary heart disease, significant arrhythmia, peripheral arterial disease or other structural heart disease.    Revised Cardiac Risk Index (RCRI):  The patient has the following serious cardiovascular risks for perioperative complications:   - No serious cardiac risks = 0 points     RCRI Interpretation: 0 points: Class I (very low risk - 0.4% complication rate)             Signed Electronically by: Carmen Beach MD MPH    Copy of this evaluation report is provided to requesting physician.    [facility] Guidelines    Revised Cardiac Risk Index

## 2021-03-05 NOTE — PATIENT INSTRUCTIONS
At [facility], we strive to deliver an exceptional experience to you, every time we see you. If you receive a survey, please complete it as we do value your feedback.  If you have MyChart, you can expect to receive results automatically within 24 hours of their completion.  Your provider will send a note interpreting your results as well.   If you do not have MyChart, you should receive your results in about a week by mail.    Your care team:                            Family Medicine Internal Medicine   MD Jack Muñoz MD Shantel Branch-Fleming, MD Srinivasa Vaka, MD Katya Belousova, PAHARRIS Estrella, APRN CNP    Perez Ho, MD Pediatrics   Josue Cain, PAHARRIS Kendall, CNP MD rAlin London APRN CNP   MD Deonna Simmons MD Deborah Mielke, MD Lety Lopez, APRN [facility]      Clinic hours: Monday - Thursday 7 am-6 pm; Fridays 7 am-5 pm.   Urgent care: Monday - Friday 11 am-9 pm; Saturday and Sunday 9 am-5 pm.    Clinic: [phone]       [facility]: Monday - Thursday 8 am - 7 pm; Friday 8 am - 6 pm  [facility]: [phone]     Use [email or web address] for 24/7 diagnosis and treatment of dozens of conditions.    Preparing for Your Surgery  Getting started  A nurse will call you to review your health history and instructions. They will give you an arrival time based on your scheduled surgery time.  Please be ready to share the following:    Your doctor's clinic name and phone number    Your medical, surgical and anesthesia history    A list of allergies and sensitivities    A list of medicines, including herbal treatments and over-the-counter drugs    Whether the patient has a legal guardian (ask how to send us the papers in advance)  If you have a child who's having surgery, please ask for a copy of Preparing for Your Child's Surgery.    Preparing for  surgery    Within 30 days of surgery: Have a pre-op exam (sometimes called an H&P, or History and Physical). This can be done at a clinic or pre-operative center.  ? If you're having a , you may not need this exam. Talk to your care team    At your pre-op exam, talk to your care team about all medicines you take. If you need to stop any medicines before surgery, ask when to start taking them again.  ? We do this for your safety. Many medicines can make you bleed too much during surgery. Some change how well surgery (anesthesia) drugs work.    Call your insurance company to let them know you're having surgery. (If you don't have insurance, call [phone].)    Call your clinic if there's any change in your health. This includes signs of a cold or flu (sore throat, runny nose, cough, rash, fever). It also includes a scrape or scratch near the surgery site.    If you have questions on the day of surgery, call your hospital or surgery center.  Eating and drinking guidelines  For your safety: Unless your surgeon tells you otherwise, follow the guidelines below.    Eat and drink as usual until 8 hours before surgery. After that, no food or milk.    Drink clear liquids until 2 hours before surgery. These are liquids you can see through, like water, Gatorade and Propel Water. You may also have black coffee and tea (no cream or milk).    Nothing by mouth within 2 hours of surgery. This includes gum, candy and breath mints.    If you drink, stop drinking alcohol the night before surgery.    If your care team tells you to take medicine on the morning of surgery, it's okay to take it with a sip of water.  Preventing infection    Shower or bathe the night before and morning of your surgery. Follow the instructions your clinic gave you. (If no instructions, use regular soap.)    Don't shave or clip hair near your surgery site. We'll remove the hair if needed.    Don't smoke or vape the morning of surgery. You may chew  nicotine gum up to 2 hours before surgery. A nicotine patch is okay.  ? Note: Some surgeries require you to completely quit smoking and nicotine. Check with your surgeon.    Your care team will make every effort to keep you safe from infection. We will:  ? Clean our hands often with soap and water (or an alcohol-based hand rub).  ? Clean the skin at your surgery site with a special soap that kills germs.  ? Give you a special gown to keep you warm. (Cold raises the risk of infection.)  ? Wear special hair covers, masks, gowns and gloves during surgery.  ? Give antibiotic medicine, if prescribed. Not all surgeries need antibiotics.  What to bring on the day of surgery    Photo ID and insurance card    Copy of your health care directive, if you have one    Glasses and hearing aides (bring cases)  ? You can't wear contacts during surgery    Inhaler and eye drops, if you use them (tell us about these when you arrive)    CPAP machine or breathing device, if you use them    A few personal items, if spending the night    If you have . . .  ? A pacemaker or ICD (cardiac defibrillator): Bring the ID card.  ? An implanted stimulator: Bring the remote control.  ? A legal guardian: Bring a copy of the certified (court-stamped) guardianship papers.  Please remove any jewelry, including body piercings. Leave jewelry and other valuables at home.  If you're going home the day of surgery  Important: If you don't follow the rules below, we must cancel your surgery.     Arrange for someone to drive you home after surgery. You may not drive, take a taxi or take public transportation by yourself (unless you'll have local anesthesia only).    Arrange for a responsible adult to stay with you overnight. If you don't, we may keep you in the hospital overnight, and you may need to pay the costs yourself.  Questions?   If you have any questions for your care team, list them here:  _________________________________________________________________________________________________________________________________________________________________________________________________________________________________________________________________________________________________________________________  For informational purposes only. Not to replace the advice of your health care provider. Copyright   2003, 2019 [facility]. All rights reserved. Clinically reviewed by Jodie Orlando MD. SMART[employer] [id number] - REV 4/20.

## 2021-03-05 NOTE — RESULT ENCOUNTER NOTE
Arpita, your test results were within normal limits.  Electrolytes, glucose and kidney function are normal.     Please do not hesitate to call us at [phone] if you have any questions or concerns.    Thank you,    Carmen Beach MD MPH

## 2021-03-06 LAB
LABORATORY COMMENT REPORT: NORMAL
SARS-COV-2 RNA RESP QL NAA+PROBE: NEGATIVE
SPECIMEN SOURCE: NORMAL

## 2021-03-08 ENCOUNTER — ANESTHESIA EVENT (OUTPATIENT)
Dept: SURGERY | Facility: AMBULATORY SURGERY CENTER | Age: 36
End: 2021-03-08

## 2021-03-09 ENCOUNTER — HOSPITAL ENCOUNTER (OUTPATIENT)
Facility: AMBULATORY SURGERY CENTER | Age: 36
Discharge: HOME | End: 2021-03-09
Attending: ORTHOPAEDIC SURGERY | Admitting: ORTHOPAEDIC SURGERY
Payer: COMMERCIAL

## 2021-03-09 ENCOUNTER — ANCILLARY PROCEDURE (OUTPATIENT)
Dept: GENERAL RADIOLOGY | Facility: CLINIC | Age: 36
End: 2021-03-09
Attending: ORTHOPAEDIC SURGERY
Payer: COMMERCIAL

## 2021-03-09 ENCOUNTER — ANESTHESIA (OUTPATIENT)
Dept: SURGERY | Facility: AMBULATORY SURGERY CENTER | Age: 36
End: 2021-03-09

## 2021-03-09 VITALS
OXYGEN SATURATION: 98 % | HEART RATE: 70 BPM | DIASTOLIC BLOOD PRESSURE: 68 MMHG | SYSTOLIC BLOOD PRESSURE: 110 MMHG | TEMPERATURE: 98 F | HEIGHT: 65 IN | WEIGHT: 160 LBS | BODY MASS INDEX: 26.66 KG/M2 | RESPIRATION RATE: 16 BRPM

## 2021-03-09 DIAGNOSIS — M87.019 AVASCULAR NECROSIS OF BONE OF SHOULDER (H): Primary | ICD-10-CM

## 2021-03-09 LAB
HCG UR QL: NEGATIVE
INTERNAL QC OK POCT: YES

## 2021-03-09 PROCEDURE — 81025 URINE PREGNANCY TEST: CPT | Performed by: PATHOLOGY

## 2021-03-09 PROCEDURE — 73030 X-RAY EXAM OF SHOULDER: CPT | Mod: LT | Performed by: RADIOLOGY

## 2021-03-09 PROCEDURE — 23470 RECONSTRUCT SHOULDER JOINT: CPT

## 2021-03-09 DEVICE — IMP ANCHOR ARTHREX BIO-SWIVELOCK 4.75X22MM AR-2324BCC-2: Type: IMPLANTABLE DEVICE | Site: SHOULDER | Status: FUNCTIONAL

## 2021-03-09 DEVICE — IMPLANTABLE DEVICE: Type: IMPLANTABLE DEVICE | Site: SHOULDER | Status: FUNCTIONAL

## 2021-03-09 RX ORDER — BUPIVACAINE HYDROCHLORIDE 5 MG/ML
INJECTION, SOLUTION EPIDURAL; INTRACAUDAL PRN
Status: DISCONTINUED | OUTPATIENT
Start: 2021-03-09 | End: 2021-03-09

## 2021-03-09 RX ORDER — ONDANSETRON 2 MG/ML
4 INJECTION INTRAMUSCULAR; INTRAVENOUS EVERY 30 MIN PRN
Status: DISCONTINUED | OUTPATIENT
Start: 2021-03-09 | End: 2021-03-10 | Stop reason: HOSPADM

## 2021-03-09 RX ORDER — KETOROLAC TROMETHAMINE 30 MG/ML
INJECTION, SOLUTION INTRAMUSCULAR; INTRAVENOUS PRN
Status: DISCONTINUED | OUTPATIENT
Start: 2021-03-09 | End: 2021-03-09

## 2021-03-09 RX ORDER — OXYCODONE HYDROCHLORIDE 5 MG/1
5 TABLET ORAL
Status: DISCONTINUED | OUTPATIENT
Start: 2021-03-09 | End: 2021-03-10 | Stop reason: HOSPADM

## 2021-03-09 RX ORDER — DEXAMETHASONE SODIUM PHOSPHATE 4 MG/ML
INJECTION, SOLUTION INTRA-ARTICULAR; INTRALESIONAL; INTRAMUSCULAR; INTRAVENOUS; SOFT TISSUE PRN
Status: DISCONTINUED | OUTPATIENT
Start: 2021-03-09 | End: 2021-03-09

## 2021-03-09 RX ORDER — NALOXONE HYDROCHLORIDE 1 MG/ML
.1-.4 INJECTION INTRAMUSCULAR; INTRAVENOUS; SUBCUTANEOUS
Qty: 0.6 MG | Refills: 0 | Status: SHIPPED | OUTPATIENT
Start: 2021-03-09 | End: 2021-06-02

## 2021-03-09 RX ORDER — ONDANSETRON 4 MG/1
4 TABLET, ORALLY DISINTEGRATING ORAL EVERY 30 MIN PRN
Status: DISCONTINUED | OUTPATIENT
Start: 2021-03-09 | End: 2021-03-10 | Stop reason: HOSPADM

## 2021-03-09 RX ORDER — TRANEXAMIC ACID 650 MG/1
1950 TABLET ORAL ONCE
Status: DISCONTINUED | OUTPATIENT
Start: 2021-03-09 | End: 2021-03-09 | Stop reason: HOSPADM

## 2021-03-09 RX ORDER — ONDANSETRON 4 MG/1
4-8 TABLET, ORALLY DISINTEGRATING ORAL EVERY 8 HOURS PRN
Qty: 10 TABLET | Refills: 0 | Status: SHIPPED | OUTPATIENT
Start: 2021-03-09 | End: 2021-06-02

## 2021-03-09 RX ORDER — CEFAZOLIN SODIUM 2 G/50ML
2 SOLUTION INTRAVENOUS SEE ADMIN INSTRUCTIONS
Status: DISCONTINUED | OUTPATIENT
Start: 2021-03-09 | End: 2021-03-09 | Stop reason: HOSPADM

## 2021-03-09 RX ORDER — CEFAZOLIN SODIUM 2 G/50ML
2 SOLUTION INTRAVENOUS
Status: DISCONTINUED | OUTPATIENT
Start: 2021-03-09 | End: 2021-03-09 | Stop reason: HOSPADM

## 2021-03-09 RX ORDER — SODIUM CHLORIDE, SODIUM LACTATE, POTASSIUM CHLORIDE, CALCIUM CHLORIDE 600; 310; 30; 20 MG/100ML; MG/100ML; MG/100ML; MG/100ML
INJECTION, SOLUTION INTRAVENOUS CONTINUOUS
Status: DISCONTINUED | OUTPATIENT
Start: 2021-03-09 | End: 2021-03-09 | Stop reason: HOSPADM

## 2021-03-09 RX ORDER — ACETAMINOPHEN 325 MG/1
650 TABLET ORAL EVERY 4 HOURS PRN
Qty: 100 TABLET | Refills: 0 | Status: SHIPPED | OUTPATIENT
Start: 2021-03-09 | End: 2022-02-25

## 2021-03-09 RX ORDER — KETOROLAC TROMETHAMINE 10 MG/1
10 TABLET, FILM COATED ORAL EVERY 8 HOURS
Qty: 12 TABLET | Refills: 0 | Status: SHIPPED | OUTPATIENT
Start: 2021-03-09 | End: 2021-03-13

## 2021-03-09 RX ORDER — SODIUM CHLORIDE, SODIUM LACTATE, POTASSIUM CHLORIDE, CALCIUM CHLORIDE 600; 310; 30; 20 MG/100ML; MG/100ML; MG/100ML; MG/100ML
INJECTION, SOLUTION INTRAVENOUS CONTINUOUS
Status: DISCONTINUED | OUTPATIENT
Start: 2021-03-09 | End: 2021-03-10 | Stop reason: HOSPADM

## 2021-03-09 RX ORDER — NALOXONE HYDROCHLORIDE 0.4 MG/ML
0.4 INJECTION, SOLUTION INTRAMUSCULAR; INTRAVENOUS; SUBCUTANEOUS
Status: DISCONTINUED | OUTPATIENT
Start: 2021-03-09 | End: 2021-03-10 | Stop reason: HOSPADM

## 2021-03-09 RX ORDER — ONDANSETRON 2 MG/ML
INJECTION INTRAMUSCULAR; INTRAVENOUS PRN
Status: DISCONTINUED | OUTPATIENT
Start: 2021-03-09 | End: 2021-03-09

## 2021-03-09 RX ORDER — PROPOFOL 10 MG/ML
INJECTION, EMULSION INTRAVENOUS PRN
Status: DISCONTINUED | OUTPATIENT
Start: 2021-03-09 | End: 2021-03-09

## 2021-03-09 RX ORDER — FENTANYL CITRATE 50 UG/ML
INJECTION, SOLUTION INTRAMUSCULAR; INTRAVENOUS PRN
Status: DISCONTINUED | OUTPATIENT
Start: 2021-03-09 | End: 2021-03-09

## 2021-03-09 RX ORDER — BUPIVACAINE HYDROCHLORIDE 2.5 MG/ML
INJECTION, SOLUTION INFILTRATION; PERINEURAL PRN
Status: DISCONTINUED | OUTPATIENT
Start: 2021-03-09 | End: 2021-03-09 | Stop reason: HOSPADM

## 2021-03-09 RX ORDER — NALOXONE HYDROCHLORIDE 0.4 MG/ML
0.2 INJECTION, SOLUTION INTRAMUSCULAR; INTRAVENOUS; SUBCUTANEOUS
Status: DISCONTINUED | OUTPATIENT
Start: 2021-03-09 | End: 2021-03-10 | Stop reason: HOSPADM

## 2021-03-09 RX ORDER — LIDOCAINE 40 MG/G
CREAM TOPICAL
Status: DISCONTINUED | OUTPATIENT
Start: 2021-03-09 | End: 2021-03-09 | Stop reason: HOSPADM

## 2021-03-09 RX ORDER — ACETAMINOPHEN 325 MG/1
650 TABLET ORAL
Status: DISCONTINUED | OUTPATIENT
Start: 2021-03-09 | End: 2021-03-10 | Stop reason: HOSPADM

## 2021-03-09 RX ORDER — CEFAZOLIN SODIUM 1 G/50ML
1 SOLUTION INTRAVENOUS EVERY 8 HOURS
Qty: 2 EACH | Refills: 0 | Status: SHIPPED | OUTPATIENT
Start: 2021-03-09 | End: 2021-06-02

## 2021-03-09 RX ORDER — MEPERIDINE HYDROCHLORIDE 25 MG/ML
12.5 INJECTION INTRAMUSCULAR; INTRAVENOUS; SUBCUTANEOUS
Status: DISCONTINUED | OUTPATIENT
Start: 2021-03-09 | End: 2021-03-10 | Stop reason: HOSPADM

## 2021-03-09 RX ORDER — AMOXICILLIN 250 MG
1-2 CAPSULE ORAL 2 TIMES DAILY
Qty: 30 TABLET | Refills: 0 | Status: SHIPPED | OUTPATIENT
Start: 2021-03-09 | End: 2021-06-02

## 2021-03-09 RX ORDER — ONDANSETRON 4 MG/1
4 TABLET, ORALLY DISINTEGRATING ORAL
Status: DISCONTINUED | OUTPATIENT
Start: 2021-03-09 | End: 2021-03-10 | Stop reason: HOSPADM

## 2021-03-09 RX ORDER — PROPOFOL 10 MG/ML
INJECTION, EMULSION INTRAVENOUS CONTINUOUS PRN
Status: DISCONTINUED | OUTPATIENT
Start: 2021-03-09 | End: 2021-03-09

## 2021-03-09 RX ORDER — OXYCODONE HYDROCHLORIDE 5 MG/1
5-10 TABLET ORAL
Qty: 30 TABLET | Refills: 0 | Status: SHIPPED | OUTPATIENT
Start: 2021-03-09 | End: 2021-06-02

## 2021-03-09 RX ORDER — MORPHINE SULFATE 4 MG/ML
4 INJECTION, SOLUTION INTRAMUSCULAR; INTRAVENOUS
Qty: 4 ML | Refills: 0 | Status: SHIPPED | OUTPATIENT
Start: 2021-03-09 | End: 2021-06-02

## 2021-03-09 RX ADMIN — FENTANYL CITRATE 50 MCG: 50 INJECTION, SOLUTION INTRAMUSCULAR; INTRAVENOUS at 08:05

## 2021-03-09 RX ADMIN — PROPOFOL 200 MG: 10 INJECTION, EMULSION INTRAVENOUS at 08:08

## 2021-03-09 RX ADMIN — Medication 0.5 MG: at 09:12

## 2021-03-09 RX ADMIN — PROPOFOL 125 MCG/KG/MIN: 10 INJECTION, EMULSION INTRAVENOUS at 09:15

## 2021-03-09 RX ADMIN — FENTANYL CITRATE 25 MCG: 50 INJECTION, SOLUTION INTRAMUSCULAR; INTRAVENOUS at 08:28

## 2021-03-09 RX ADMIN — CEFAZOLIN SODIUM 2 G: 2 SOLUTION INTRAVENOUS at 07:59

## 2021-03-09 RX ADMIN — KETOROLAC TROMETHAMINE 30 MG: 30 INJECTION, SOLUTION INTRAMUSCULAR; INTRAVENOUS at 09:36

## 2021-03-09 RX ADMIN — BUPIVACAINE HYDROCHLORIDE 10 ML: 5 INJECTION, SOLUTION EPIDURAL; INTRACAUDAL at 07:55

## 2021-03-09 RX ADMIN — PROPOFOL 200 MCG/KG/MIN: 10 INJECTION, EMULSION INTRAVENOUS at 08:07

## 2021-03-09 RX ADMIN — SODIUM CHLORIDE, SODIUM LACTATE, POTASSIUM CHLORIDE, CALCIUM CHLORIDE: 600; 310; 30; 20 INJECTION, SOLUTION INTRAVENOUS at 07:13

## 2021-03-09 RX ADMIN — ONDANSETRON 4 MG: 2 INJECTION INTRAMUSCULAR; INTRAVENOUS at 07:59

## 2021-03-09 RX ADMIN — FENTANYL CITRATE 25 MCG: 50 INJECTION, SOLUTION INTRAMUSCULAR; INTRAVENOUS at 08:32

## 2021-03-09 RX ADMIN — PROPOFOL 40 MG: 10 INJECTION, EMULSION INTRAVENOUS at 09:44

## 2021-03-09 RX ADMIN — ONDANSETRON 4 MG: 2 INJECTION INTRAMUSCULAR; INTRAVENOUS at 11:14

## 2021-03-09 RX ADMIN — DEXAMETHASONE SODIUM PHOSPHATE 4 MG: 4 INJECTION, SOLUTION INTRA-ARTICULAR; INTRALESIONAL; INTRAMUSCULAR; INTRAVENOUS; SOFT TISSUE at 08:23

## 2021-03-09 ASSESSMENT — MIFFLIN-ST. JEOR: SCORE: 1421.64

## 2021-03-09 ASSESSMENT — LIFESTYLE VARIABLES: TOBACCO_USE: 1

## 2021-03-09 NOTE — PROGRESS NOTES
Physical Therapy Initial Evaluation: Subjective History  Date of Surgery: 3/9/21.    Surgical Procedure/Limb: L shoulder hemiarthroplasty  Surgeon Name: Dr. Stockton  Average Daily Pain Levels: 3/10 (Location: L shoulder; Quality: Aching/Throbbing)  Other Symptoms: N/A  Symptom Mgmt Strategies: Ice, pain medication  Prior orthopaedic history/procedures: See Epic Chart  Prior non-operative management: N/A  Next MD Appt Date: 3/25/21    Functional limitations following procedure: AROM of L shoulder, reaching, dressing  Previous level of function: Independent    Patient Employment: RN at [employer]      Post-Operative Physical Therapy Examination    Elbow AROM: WNL bilaterally  Wrist AROM: WNL bilaterally  Hand AROM: able to move fingers      Anthropometric Measures     Right Left Difference   Joint ROM      Flexion WNL deg NT deg  deg   External Rotation at neutral WNL deg NT deg  deg   Internal Rotation at neutral WNL deg NT deg  deg   Abduction WNL deg NT deg  deg        Right Left   Deltoid Muscle Activation WNL WNL     Status of Incision: Aquacel dressing in place      Assessment/Plan    Patient is a 36 year old female with left side shoulder complaints.    Patient has the following significant findings with corresponding treatment plan.                Diagnosis 1:  L shoulder hemiarthoplasty  Pain -  hot/cold therapy, US, electric stimulation, manual therapy, splint/taping/bracing/orthotics, self management, education and home program  Decreased ROM/flexibility - manual therapy, therapeutic exercise and home program  Decreased joint mobility - manual therapy, therapeutic exercise and home program  Decreased strength - therapeutic exercise, therapeutic activities and home program  Edema - vasopneumatics, electric stimulation, cold therapy, cryocuff and self management/home program  Impaired muscle performance - neuro re-education  Decreased function - therapeutic activities    Therapy Evaluation Codes:   1) History  comprised of:   Personal factors that impact the plan of care:      None.    Comorbidity factors that impact the plan of care are:      None.     Medications impacting care: None.  2) Examination of Body Systems comprised of:   Body structures and functions that impact the plan of care:      Shoulder.   Activity limitations that impact the plan of care are:      Bathing, Driving, Dressing, Grasping and Lifting.  3) Clinical presentation characteristics are:   Stable/Uncomplicated.  4) Decision-Making    Low complexity using standardized patient assessment instrument and/or measureable assessment of functional outcome.  Cumulative Therapy Evaluation is: Low complexity.    Previous and current functional limitations:  (See Goal Flow Sheet for this information)    Short term and Long term goals: (See Goal Flow Sheet for this information)     Communication ability:  Patient appears to be able to clearly communicate and understand verbal and written communication and follow directions correctly.  Treatment Explanation - The following has been discussed with the patient:   RX ordered/plan of care  Anticipated outcomes  Possible risks and side effects  This patient would benefit from PT intervention to resume normal activities.   Rehab potential is good.    Frequency:  1-2 X week, once daily, tapering to every other week  Duration:  for 3 months  Discharge Plan:  Achieve all LTG.  Independent in home treatment program.  Reach maximal therapeutic benefit.    Please refer to the daily flowsheet for treatment today, total treatment time and time spent performing 1:1 timed codes.

## 2021-03-09 NOTE — DISCHARGE INSTRUCTIONS
[facility]  Home Care Following Anesthesia  For 24 hours after surgery:  1. Get plenty of rest.  A responsible adult must stay with you for at least 24 hours after you leave the surgery center.  2. Do not drive or use heavy equipment.  If you have weakness or tingling, don't drive or use heavy equipment until this feeling goes away.   3. Do not drink alcohol.   4. Avoid strenuous or risky activities.  Ask for help when climbing stairs.  5. You may feel lightheaded.  IF so, sit for a few minutes before standing.  Have someone help you get up.   6. If you have nausea (feel sick to your stomach): Drink only clear liquids such as apple juice, ginger ale, broth or 7-Up.  Rest may also help.  Be sure to drink enough fluids.  Move to a regular diet as you feel able.   7. You may have a slight fever.  Call the doctor if your fever is over 100 F (37.7 C) (taken under the tongue) or lasts longer than 24 hours.  8. You may have a dry mouth, a sore throat, muscle aches or trouble sleeping. These should go away after 24 hours.  9. Do not make important or legal decisions.               Tips for taking pain medications  To get the best pain relief possible, remember these points:    Take pain medications as directed, before pain becomes severe.    Pain medication can upset your stomach: taking it with food may help.    Constipation is a common side effect of pain medication. Drink plenty of  fluids.    Eat foods high in fiber. Take a stool softener if recommended by your doctor or pharmacist.    Do not drink alcohol, drive or operate machinery while taking pain medications.    Ask about other ways to control pain, such as with heat, ice or relaxation.    Tylenol/Acetaminophen Consumption  To help encourage the safe use of acetaminophen, the makers of TYLENOL  have lowered the maximum daily dose for single-ingredient Extra Strength TYLENOL  (acetaminophen) products sold in the [place] from 8  "pills per day (4,000 mg) to 6 pills per day (3,000 mg). The dosing interval has also changed from 2 pills every 4-6 hours to 2 pills every 6 hours.    If you feel your pain relief is insufficient, you may take Tylenol/Acetaminophen in addition to your narcotic pain medication.     Be careful not to exceed 3,000 mg of Tylenol/Acetaminophen in a 24 hour period from all sources.    If you are taking extra strength Tylenol/acetaminophen (500 mg), the maximum dose is 6 tablets in 24 hours.    If you are taking regular strength acetaminophen (325 mg), the maximum dose is 9 tablets in 24 hours.    Call a doctor for any of the followin. Signs of infection (fever, growing tenderness at the surgery site, a large amount of drainage or bleeding, severe pain, foul-smelling drainage, redness, swelling).  2. It has been over 8 to 10 hours since surgery and you are still not able to urinate (pass water).  3. Headache for over 24 hours.  4. Numbness, tingling or weakness the day after surgery (if you had spinal anesthesia).  5. Signs of Covid-19 infection (temperature over 100 degrees, shortness of breath, cough, loss of taste/smell, generalized body aches, persistent headache, chills, sore throat, nausea/vomiting/diarrhea)  Your doctor is:  Dr. Steffanie Stockton, Orthopaedics: [phone]                    Or dial [phone] and ask for the resident on call for:  Orthopaedics  For emergency care, call the:  [facility] Emergency Department: [phone] (TTY for hearing impaired: [phone])    Information about liposomal bupivacaine (Exparel)    What is Liposomal Bupivacaine?    Liposomal Bupivacaine is a numbing medication that can help you manage your pain after surgery.  This medication is similar to \"novacaine,\" which is often used by the dentist.  Liposomal bupivacaine is released slowly and can help control pain for up to 72 hours.    What is the purpose of Liposomal Bupivacaine?    To manage your pain after " surgery    To help you sleep better, take deep breaths, walk more comfortable, and feel up to visiting with others    How is the procedure done?    Liposomal bupivacaine is a medication given by an injection.    It is usually given right before your surgery.  If this is the case, you will be awake or sedated, but you should experience minimal pain during the procedure.    For some people, the injection may be given at the very end of your surgery.  It all depends on the type of surgery and your situation.    The procedure usually takes about 5-15 minutes.  An ultrasound machine will help the anesthesiologist insert it in the right place or the surgeon will inject it under direct vision.     A needle is used to place the numbing medication under your skin.  It provides pain relief by numbing the tissue in the area where your surgeon will make the incision.    What can I expect?    You may experience numbness, tingling, or a feeling of heaviness around the area that was injected.    If you experience any of the follow symptoms IMMEDIATELY CALL THE REGIONAL ANESTHESIA PAIN SERVICE:    Numbness or tingling occurs in areas other than around the injection site    Blurry vision    Ringing in your ears    A metallic taste in your mouth    PAGE: Dial [phone].  When prompted, enter the following 4-digit ID number:  [id number].  You will be prompted to enter your phone number; and then enter the # sign.  The clinician on call will call you back.    OR    CALL: Dial [phone].  Let the hospital  know that you are having a problem with a nerve block and that you would like to speak to the regional anesthesia pain service right away.    You should not receive any other type of numbing medication within 4 days after receiving liposomal bupivacaine unless your anesthesiologist approves.      Post Operative Instructions: Regional Anesthetic for Upper Extremity with Liposomal Bupivacaine  General Information:   Regional  anesthesia is when local anesthetic or  numbing  medication is injected around the nerves to anesthetize or  numb  the area supplied by that set of nerves. It is a type of analgesia used to control pain and decreases the need for narcotics following surgery.    Types of Regional Blocks:  Interscalene: A block injected into the neck on the operative shoulder/arm of a patient having shoulder surgery  Supraclavicular: A block injected near the clavicle on the operative shoulder of a patient having elbow, forearm, or hand surgery    Procedure:  The type of anesthesia your doctor used to numb your shoulder or arm will usually not start to wear off for 24-48 hours, but may last as long as 72 hours. You should be careful during that period, since it is possible to injure your arm without being aware of the injury. While your arm is numb, you should:    Avoid striking or bumping your arm    Avoid extreme hot or cold    Diet:  There are no restrictions on your diet. You should drink plenty of fluids.     Discomfort:  You will have a tingling and prickly sensation in your arm as the feeling begins to return. You can also expect some discomfort. The amount of discomfort is unpredictable, but if you have more pain than can be controlled with pain medication you should notify your physician.     Pain Medications:  Begin taking your oral pain pills before bedtime and during the night to avoid a sudden onset of pain as part of the block wears off.  Do not engage in drinking, driving, or hazardous occupations while taking pain medication.     Stitches:   You may have stitches or special skin closures. You doctor will inform you when to return to the office to have them removed.     Activity:  On the day of surgery you should try to stay in bed with your hand elevated on pillows. You may resume your normal activity after that, wearing a sling for comfort. Contact your physician if you have any of the problems:     Continued numbness  or tingling in the arm or hand after 72 hours    Swelling of the fingers or fingers that are cold to the touch    Excessive bleeding or drainage    Severe pain

## 2021-03-09 NOTE — BRIEF OP NOTE
[facility] Brief Operative Note    Pre-operative diagnosis: Avascular necrosis of left humeral head (H) [M87.022]   Post-operative diagnosis Same   Procedure: Procedure(s):  Left Shoulder Resurfacing Hemiarthroplasty   Surgeon(s): Surgeon(s) and Role:     * Steffanie Stockton MD - Primary   Estimated blood loss: 75 mL    Specimens: * No specimens in log *   Findings: Significant collapse of area of clear AVN

## 2021-03-09 NOTE — ANESTHESIA CARE TRANSFER NOTE
Patient: Arpita Zafar    Procedure(s):  Left Shoulder Resurfacing Hemiarthroplasty    Diagnosis: Avascular necrosis of left humeral head (H) [M87.022]  Diagnosis Additional Information: No value filed.    Anesthesia Type:   General     Note:    Oropharynx: spontaneously breathing and oropharynx clear of all foreign objects  Level of Consciousness: awake  Oxygen Supplementation: room air    Independent Airway: airway patency satisfactory and stable  Dentition: dentition changed  Vital Signs Stable: post-procedure vital signs reviewed and stable  Report to RN Given: handoff report given  Patient transferred to: PACU  Comments: Uneventful transport   Report to RN  Exchanging well; color natl  Pt responds appropriately to command  IV patent  Lips/teeth/dentition as preop status  Questions answered  /78  HR 91 nsr  TAT 97.2  RR 16  Sat 98%    Handoff Report: Identifed the Patient, Identified the Reponsible Provider, Reviewed the pertinent medical history, Discussed the surgical course, Reviewed Intra-OP anesthesia mangement and issues during anesthesia, Set expectations for post-procedure period and Allowed opportunity for questions and acknowledgement of understanding      Vitals: (Last set prior to Anesthesia Care Transfer)  CRNA VITALS  3/9/2021 0928 - 3/9/2021 1005      3/9/2021             Resp Rate (set):  10        Electronically Signed By: SALAS MENESES CRNA  March 9, 2021  10:05 AM

## 2021-03-09 NOTE — ANESTHESIA POSTPROCEDURE EVALUATION
Patient: Arpita Zafar    Procedure(s):  Left Shoulder Resurfacing Hemiarthroplasty    Diagnosis:Avascular necrosis of left humeral head (H) [M87.022]  Diagnosis Additional Information: No value filed.    Anesthesia Type:  General    Note:  Disposition: Outpatient   Postop Pain Control: Uneventful            Sign Out: Well controlled pain   PONV: No   Neuro/Psych: Uneventful            Sign Out: Acceptable/Baseline neuro status   Airway/Respiratory: Uneventful            Sign Out: Acceptable/Baseline resp. status   CV/Hemodynamics: Uneventful            Sign Out: Acceptable CV status   Other NRE: NONE   DID A NON-ROUTINE EVENT OCCUR? No         Last vitals:  Vitals:    03/09/21 1030 03/09/21 1109 03/09/21 1229   BP: 110/67 111/64 110/68   Pulse:  73 70   Resp: 12 16 16   Temp: 36.7  C (98  F) 36.7  C (98  F) 36.7  C (98  F)   SpO2: 96% 96% 98%       Last vitals prior to Anesthesia Care Transfer:  CRNA VITALS  3/9/2021 0928 - 3/9/2021 1028      3/9/2021             Resp Rate (set):  10          Electronically Signed By: Oskar Fay DO  March 9, 2021  2:16 PM

## 2021-03-09 NOTE — ANESTHESIA PREPROCEDURE EVALUATION
Anesthesia Pre-Procedure Evaluation    Patient: Arpita Zafar   MRN: [id number] : 1985        Preoperative Diagnosis: Avascular necrosis of left humeral head (H) [M87.022]   Procedure : Procedure(s):  Left Shoulder Resurfacing Hemiarthroplasty     Past Medical History:   Diagnosis Date     Abnormal Pap smear of cervix 10/31/2018    See problem list     Cervical high risk HPV (human papillomavirus) test positive 2017, 10/31/18    See problem list      Past Surgical History:   Procedure Laterality Date     CONIZATION LEEP N/A 1/15/2019    Procedure: LOOP ELECTROSURGICAL EXCISION PROCEDURE WITH CONIZATION OF CERVIX;  Surgeon: Corey Blakely MD;  Location: [facility]     HC COLP CERVIX/UPPER VAGINA W ENDOCERV CURETT  2018      BREAST BIOPSY CORE NEEDLE LEFT        No Known Allergies   Social History     Tobacco Use     Smoking status: Former Smoker     Types: Cigarettes     Quit date: 10/31/2015     Years since quittin.3     Smokeless tobacco: Never Used   Substance Use Topics     Alcohol use: Yes     Comment: occasionally wine      Wt Readings from Last 1 Encounters:   21 72.6 kg (160 lb)        Anesthesia Evaluation   Pt has had prior anesthetic.     History of anesthetic complications       ROS/MED HX  ENT/Pulmonary:     (+) tobacco use, Past use,     Neurologic:  - neg neurologic ROS     Cardiovascular:     (+) hypertension-----    METS/Exercise Tolerance: >4 METS    Hematologic:  - neg hematologic  ROS     Musculoskeletal: Comment: Avascular necrosis of bone of shoulder      GI/Hepatic:  - neg GI/hepatic ROS     Renal/Genitourinary:  - neg Renal ROS     Endo:  - neg endo ROS     Psychiatric/Substance Use:  - neg psychiatric ROS     Infectious Disease:  - neg infectious disease ROS     Malignancy:  - neg malignancy ROS     Other:  - neg other ROS          Physical Exam    Airway  airway exam normal      Mallampati: I   TM distance: > 3 FB   Neck ROM: full   Mouth opening: > 3  cm    Respiratory Devices and Support         Dental  no notable dental history         Cardiovascular   cardiovascular exam normal       Rhythm and rate: regular and normal     Pulmonary   pulmonary exam normal        breath sounds clear to auscultation           OUTSIDE LABS:  CBC:   Lab Results   Component Value Date    WBC 6.1 01/08/2021    WBC 5.5 08/16/2017    HGB 14.0 01/08/2021    HGB 14.9 08/16/2017    HCT 41.4 01/08/2021    HCT 43.5 08/16/2017     01/08/2021     08/16/2017     BMP:   Lab Results   Component Value Date     03/05/2021     01/22/2021    POTASSIUM 4.1 03/05/2021    POTASSIUM 4.2 01/22/2021    CHLORIDE 103 03/05/2021    CHLORIDE 103 01/22/2021    CO2 28 03/05/2021    CO2 34 (H) 01/22/2021    BUN 15 03/05/2021    BUN 15 01/22/2021    CR 0.78 03/05/2021    CR 0.71 01/22/2021    GLC 84 03/05/2021    GLC 83 01/22/2021     COAGS: No results found for: PTT, INR, FIBR  POC:   Lab Results   Component Value Date    HCG Negative 03/09/2021     HEPATIC:   Lab Results   Component Value Date    ALBUMIN 4.0 08/16/2017    PROTTOTAL 7.4 08/16/2017    ALT 22 08/16/2017    AST 9 08/16/2017    ALKPHOS 58 08/16/2017    BILITOTAL 0.6 08/16/2017     OTHER:   Lab Results   Component Value Date    MARY 9.4 03/05/2021    TSH 1.11 08/16/2017       Anesthesia Plan    ASA Status:  2   NPO Status:  NPO Appropriate    Anesthesia Type: General.     - Airway: LMA   Induction: Intravenous, Propofol.   Maintenance: Balanced.        Consents    Anesthesia Plan(s) and associated risks, benefits, and realistic alternatives discussed. Questions answered and patient/representative(s) expressed understanding.     - Discussed with:  Patient    Use of blood products discussed: No .     Postoperative Care    Pain management: Multi-modal analgesia, Peripheral nerve block (Single Shot).   PONV prophylaxis: Ondansetron (or other 5HT-3), Dexamethasone or Solumedrol     Comments:                Oskar Fay DO

## 2021-03-09 NOTE — ANESTHESIA PROCEDURE NOTES
Pre-Procedure   Staff -   Anesthesiologist:  Oskar Fay DO  Performed By: anesthesiologist  Location: pre-op  Procedure Start/Stop Times: 3/9/2021 7:50 AM and 3/9/2021 7:55 AM  Pre-Anesthestic Checklist: patient identified, IV checked, site marked, risks and benefits discussed, informed consent, monitors and equipment checked, pre-op evaluation, at physician/surgeon's request and post-op pain management  Timeout:  Correct Patient: Yes   Correct Procedure: Yes   Correct Site: Yes   Correct Position: Yes   Correct Laterality: Yes   Site Marked: Yes    Procedure Documentation  Procedure: Interscalene  Diagnosis: POST OP PAIN  Laterality: left  Patient Position:supine  Patient Prep/Sterile Barriers: sterile gloves, mask, Chloraprep, patient draped  Local skin infiltrated with 2 mL of 1% lidocaine.   Needle type: short bevel  Needle Gauge: 21.   Needle Length (Inches) 2   Ultrasound guided, Ultrasound used to identify targeted nerve, plexus, vascular marker, or fascial plane and place a needle adjacent to it in real-time, Ultrasound was used to visualize the spread of anesthetic in close proximity to the above referenced structure  A permanent image is entered into the patient's record.  The nerve(s) appeared anatomically normal, There were no apparent abnormal pathologic findings    Assessment/Narrative      The placement was negative for: blood aspirated, painful injection and site bleeding  Paresthesias: No.  Bolus given via needle..   Secured via.   Insertion/Infusion Method: Single Shot  Complications: none  Injection made incrementally with aspirations every 5 mL.  Comments:  Informed consent obtained.  All risks and benefits of the nerve block discussed with the patient.  All questions answered and all parties agreed with the plan.   Block was placed at the surgeon's request for post operative pain control.  Exparel 133mg given in the block

## 2021-03-10 ENCOUNTER — THERAPY VISIT (OUTPATIENT)
Dept: PHYSICAL THERAPY | Facility: CLINIC | Age: 36
End: 2021-03-10
Payer: COMMERCIAL

## 2021-03-10 DIAGNOSIS — M87.019 AVASCULAR NECROSIS OF BONE OF SHOULDER (H): Primary | ICD-10-CM

## 2021-03-10 PROCEDURE — 99207 PR STAT THERAPEUTIC EXERCISES - IAM: CPT | Performed by: PHYSICAL THERAPIST

## 2021-03-10 PROCEDURE — 99207 PR STAT PHYS THERAPY EVALUATION LOW - IAM: CPT | Performed by: PHYSICAL THERAPIST

## 2021-03-16 ENCOUNTER — THERAPY VISIT (OUTPATIENT)
Dept: PHYSICAL THERAPY | Facility: CLINIC | Age: 36
End: 2021-03-16
Payer: COMMERCIAL

## 2021-03-16 DIAGNOSIS — M25.512 CHRONIC LEFT SHOULDER PAIN: ICD-10-CM

## 2021-03-16 DIAGNOSIS — Z47.89 AFTERCARE FOLLOWING SURGERY OF THE MUSCULOSKELETAL SYSTEM, NEC: ICD-10-CM

## 2021-03-16 DIAGNOSIS — G89.29 CHRONIC LEFT SHOULDER PAIN: ICD-10-CM

## 2021-03-16 PROCEDURE — 99207 PR STAT THERAPEUTIC EXERCISES - IAM: CPT | Performed by: PHYSICAL THERAPIST

## 2021-03-17 NOTE — OP NOTE
DATE OF PROCEDURE: 3/9/21    PREOPERATIVE DIAGNOSIS:    1.Left humeral head avascular necrosis with collapse      POSTOPERATIVE DIAGNOSIS:   1.Left humeral head avascular necrosis with collapse      PROCEDURE:    1. Left shoulder hemiarthroplasty.      STAFF SURGEON:  Steffanie Stockton MD      ASSISTANT: Adonis Santiago MD; Ned Barclay MD    ANESTHESIA:  General endotracheal anesthesia with supplementary interscalene block.      ESTIMATED BLOOD LOSS:  75ml.      IMPLANTS:    1. Catalyst humeral resurfacing, size D     BRIEF PATIENT HISTORY: Arpita is a 36-year-old with collapse of her left shoulder humeral AVN. This has been associated with an onset and progressive worsening of pain after an injury at work. Nonoperative treatment has not provided lasting relief of pain or improvement in motion.  For this reason, she wished to consider surgical intervention.  We discussed the risks and benefits of shoulder arthroplasty and the patient wished to proceed with this surgery.  Informed consent was completed.      DESCRIPTION OF PROCEDURE:  The patient was identified in the preoperative area and correct left shoulder was marked for surgery.  She was provided an interscalene block by our anesthesia colleagues and was taken to the operating room where she was surrendered to general endotracheal anesthesia. She was moved to the operating room table in the beachchair position with all bony prominences well padded, and the head was placed in a head randall with the neck in neutral position.  The left upper extremity was prepped and draped in the usual sterile fashion.  A timeout was held in accordance with hospital policy, confirming correct patient, side, site, procedure, placement of lower extremity, sequential compressive devices and administration of IV antibiotics prior to incision.      I completed a deltopectoral incision and approach.  The cephalic vein was absent from the clear delto-pec interval.  I entered the  deltopectoral interval and freed the subdeltoid adhesions.  I palpated and inspected the bursal surface of the rotator cuff and found it to be intact.  I palpated the axillary nerve medially and laterally performing a tug test confirming location and continuity of the nerve.  This was performed multiple times throughout the procedure including once prior to closure.  I opened the bicipital groove and rotator cuff interval and tenotomized the biceps then tenodesing this to the upper pec.  I released the subscapularis subperiosteally off of the lesser tuberosity.  I released the capsule along the inferior neck of the humerus while protecting the axillary nerve.  I then was able to dislocate the humerus anteriorly.  I used the  guidelines to place the central pin and the prepared the humeral head with the  cut guides. The head then sized to a D. The implant was then securely impacted. The joint was reduced and the implant provided 50% bounce back on the glenoid with ER to 60 and IR to 70 in abduction. The glenoid surface was preserved without advanced chondral change. I then repaired the subscap back with the previously placed 4.5 corkscrew anchors. These were passed in mattress fashion through the subscap and tied down, then brought over to a lateral row with 4.75 SwivelLock x 2.   The wound was copiously irrigated and a deep Hemovac drain was placed.  The wound was closed in layered fashion with monofilament sutures.  Steri-Strips and a soft sterile dressing were applied.  The arm was placed into an abduction sling.  The patient was extubated and transported to recovery room in stable condition.  There were no apparent intraoperative complications.      POSTOPERATIVE PLAN:   1.  The patient will be allowed passive and active assisted range of motion with forward elevation to 140 and external rotation at the side to 40.  She will be discharged to home when she meets discharge criteria.   2.   The patient will be allowed to transition to active range of motion at 6 weeks and discontinue the sling at that time.  She will be seen by me for wound check at 2 weeks.         ANA NGO MD

## 2021-03-19 DIAGNOSIS — I10 HYPERTENSION, UNSPECIFIED TYPE: ICD-10-CM

## 2021-03-22 RX ORDER — LISINOPRIL 20 MG/1
20 TABLET ORAL DAILY
Qty: 30 TABLET | Refills: 1 | Status: SHIPPED | OUTPATIENT
Start: 2021-03-22 | End: 2021-06-02

## 2021-03-24 ENCOUNTER — THERAPY VISIT (OUTPATIENT)
Dept: PHYSICAL THERAPY | Facility: CLINIC | Age: 36
End: 2021-03-24
Payer: COMMERCIAL

## 2021-03-24 DIAGNOSIS — Z47.89 AFTERCARE FOLLOWING SURGERY OF THE MUSCULOSKELETAL SYSTEM, NEC: ICD-10-CM

## 2021-03-24 DIAGNOSIS — G89.29 CHRONIC LEFT SHOULDER PAIN: ICD-10-CM

## 2021-03-24 DIAGNOSIS — M25.512 CHRONIC LEFT SHOULDER PAIN: ICD-10-CM

## 2021-03-24 PROCEDURE — 99207 PR STAT THERAPEUTIC EXERCISES - IAM: CPT | Performed by: PHYSICAL THERAPIST

## 2021-03-24 PROCEDURE — 99207 PR STAT MANUAL THER TECH, 1+ REGIONS, EA 15 MIN - IAM: CPT | Performed by: PHYSICAL THERAPIST

## 2021-03-25 ENCOUNTER — OFFICE VISIT (OUTPATIENT)
Dept: ORTHOPEDICS | Facility: CLINIC | Age: 36
End: 2021-03-25
Payer: COMMERCIAL

## 2021-03-25 DIAGNOSIS — M87.022 AVASCULAR NECROSIS OF LEFT HUMERAL HEAD (H): Primary | ICD-10-CM

## 2021-03-25 PROCEDURE — 99024 POSTOP FOLLOW-UP VISIT: CPT | Performed by: ORTHOPAEDIC SURGERY

## 2021-03-25 NOTE — LETTER
Arpita Zafar     1985  Encounter date/time:  03/25/21       Report of Workability    Physician:  Steffanie Stockton MD    Diagnosis:  Status post left shoulder surgery    Limitations:  Left arm in sling at all times.    Return to work status: Unable to return to work until 5/4/21.    Follow-Up:   Return to clinic in 4 weeks.

## 2021-03-25 NOTE — LETTER
2021     Seen today: yes    Patient:  Arpita Zafar  :   1985    Physician: Steffanie Stockton MD    Arpita Zafar is under my care for her left shoulder. She recently had surgery and is unable to use the facilities at [facility]. Please place a pause on her membership from 3/9/21 through 21.                Steffanie Stockton MD

## 2021-03-25 NOTE — LETTER
3/25/2021         RE: Arpita Zafar  [address]  [place] [address]        Dear Colleague,    Thank you for referring your patient, Arpita Zafar, to the [facility]. Please see a copy of my visit note below.    CHIEF CONCERN: Status post left shoulder hemiarthroplasty for AVN  DATE OF SURGERY: 3/9/21    HISTORY OF PRESENT ILLNESS: Arpita is an extremely pleasant 36 year-old woman who is 2 weeks status post the above procedure. She reports that pain is very well controlled and PT is going very well.     EXAM:  Pleasant adult female in NAD  Respirations even and unlabored.  Left upper extremity: Incision clean, dry, and intact. Distally neurovascularly intact without deficits. Shoulder range of motion is assisted FE to 130 and ER at side to 30    IMAGING: None new    ASSESSMENT:  1. Two weeks status post above procedure    PLAN:    Range of Motion: Continue passive and active assisted ROM of the shoulder per operative note.     Sling: Continue sling except for bathing/dressing/rehab    Pain medication:  NSAIDs and Tylenol PRN    Follow up: in 4 weeks.  Will discontinue sling at that time and initiate AROM. Plan to advance to formal  strengthening at 3 months            Again, thank you for allowing me to participate in the care of your patient.        Sincerely,        Steffanie Stockton MD

## 2021-03-27 NOTE — PROGRESS NOTES
CHIEF CONCERN: Status post left shoulder hemiarthroplasty for AVN  DATE OF SURGERY: 3/9/21    HISTORY OF PRESENT ILLNESS: Arpita is an extremely pleasant 36 year-old woman who is 2 weeks status post the above procedure. She reports that pain is very well controlled and PT is going very well.     EXAM:  Pleasant adult female in NAD  Respirations even and unlabored.  Left upper extremity: Incision clean, dry, and intact. Distally neurovascularly intact without deficits. Shoulder range of motion is assisted FE to 130 and ER at side to 30    IMAGING: None new    ASSESSMENT:  1. Two weeks status post above procedure    PLAN:    Range of Motion: Continue passive and active assisted ROM of the shoulder per operative note.     Sling: Continue sling except for bathing/dressing/rehab    Pain medication:  NSAIDs and Tylenol PRN    Follow up: in 4 weeks.  Will discontinue sling at that time and initiate AROM. Plan to advance to formal  strengthening at 3 months

## 2021-03-30 ENCOUNTER — MYC MEDICAL ADVICE (OUTPATIENT)
Dept: FAMILY MEDICINE | Facility: CLINIC | Age: 36
End: 2021-03-30

## 2021-03-31 ENCOUNTER — THERAPY VISIT (OUTPATIENT)
Dept: PHYSICAL THERAPY | Facility: CLINIC | Age: 36
End: 2021-03-31
Payer: COMMERCIAL

## 2021-03-31 DIAGNOSIS — M25.512 CHRONIC LEFT SHOULDER PAIN: ICD-10-CM

## 2021-03-31 DIAGNOSIS — Z47.89 AFTERCARE FOLLOWING SURGERY OF THE MUSCULOSKELETAL SYSTEM, NEC: ICD-10-CM

## 2021-03-31 DIAGNOSIS — G89.29 CHRONIC LEFT SHOULDER PAIN: ICD-10-CM

## 2021-03-31 PROCEDURE — 97140 MANUAL THERAPY 1/> REGIONS: CPT | Mod: GP | Performed by: PHYSICAL THERAPIST

## 2021-03-31 PROCEDURE — 97110 THERAPEUTIC EXERCISES: CPT | Mod: GP | Performed by: PHYSICAL THERAPIST

## 2021-04-07 ENCOUNTER — MYC MEDICAL ADVICE (OUTPATIENT)
Dept: ORTHOPEDICS | Facility: CLINIC | Age: 36
End: 2021-04-07

## 2021-04-07 ENCOUNTER — THERAPY VISIT (OUTPATIENT)
Dept: PHYSICAL THERAPY | Facility: CLINIC | Age: 36
End: 2021-04-07
Payer: COMMERCIAL

## 2021-04-07 DIAGNOSIS — G89.29 CHRONIC LEFT SHOULDER PAIN: ICD-10-CM

## 2021-04-07 DIAGNOSIS — Z47.89 AFTERCARE FOLLOWING SURGERY OF THE MUSCULOSKELETAL SYSTEM, NEC: ICD-10-CM

## 2021-04-07 DIAGNOSIS — M25.512 CHRONIC LEFT SHOULDER PAIN: ICD-10-CM

## 2021-04-07 PROCEDURE — 99207 PR STAT MANUAL THER TECH, 1+ REGIONS, EA 15 MIN - IAM: CPT | Performed by: PHYSICAL THERAPIST

## 2021-04-07 PROCEDURE — 99207 PR STAT THERAPEUTIC EXERCISES - IAM: CPT | Performed by: PHYSICAL THERAPIST

## 2021-04-08 ENCOUNTER — MEDICAL CORRESPONDENCE (OUTPATIENT)
Dept: HEALTH INFORMATION MANAGEMENT | Facility: CLINIC | Age: 36
End: 2021-04-08

## 2021-04-14 ENCOUNTER — THERAPY VISIT (OUTPATIENT)
Dept: PHYSICAL THERAPY | Facility: CLINIC | Age: 36
End: 2021-04-14
Payer: COMMERCIAL

## 2021-04-14 DIAGNOSIS — G89.29 CHRONIC LEFT SHOULDER PAIN: ICD-10-CM

## 2021-04-14 DIAGNOSIS — Z47.89 AFTERCARE FOLLOWING SURGERY OF THE MUSCULOSKELETAL SYSTEM, NEC: ICD-10-CM

## 2021-04-14 DIAGNOSIS — M25.512 CHRONIC LEFT SHOULDER PAIN: ICD-10-CM

## 2021-04-14 PROCEDURE — 99207 PR STAT THERAPEUTIC EXERCISES - IAM: CPT | Performed by: PHYSICAL THERAPIST

## 2021-04-14 PROCEDURE — 99207 PR STAT MANUAL THER TECH, 1+ REGIONS, EA 15 MIN - IAM: CPT | Performed by: PHYSICAL THERAPIST

## 2021-04-14 NOTE — PROGRESS NOTES
Subjective:  HPI  Physical Exam                    Objective:  System    Physical Exam    General     ROS    Assessment/Plan:    PROGRESS  REPORT    Progress reporting period is from 3/10/21 to 4/14/21.       SUBJECTIVE  Patient reports shoulder is good. Just overall still achy. Resting helps.     Current Pain level: 1/10.     Initial Pain level: 3/10.   Changes in function:  Yes (See Goal flowsheet attached for changes in current functional level)  Adverse reaction to treatment or activity: None    OBJECTIVE  Changes noted in objective findings:  Yes,   Objective: AAROM flex 125, abd 122, ER 49,      ASSESSMENT/PLAN  Updated problem list and treatment plan: Diagnosis 1:  Left shoulder pain / hemiarthroplasty  Pain -  hot/cold therapy, manual therapy, self management, education and home program  Decreased ROM/flexibility - manual therapy, therapeutic exercise, therapeutic activity and home program  Decreased joint mobility - manual therapy, therapeutic exercise, therapeutic activity and home program  Decreased strength - therapeutic exercise, therapeutic activities and home program  Decreased function - therapeutic activities and home program  STG/LTGs have been met or progress has been made towards goals:  Yes (See Goal flow sheet completed today.)  Assessment of Progress: The patient's condition is improving.  Self Management Plans:  Patient is independent in a home treatment program.  I have re-evaluated this patient and find that the nature, scope, duration and intensity of the therapy is appropriate for the medical condition of the patient.  Arpita continues to require the following intervention to meet STG and LTG's:  PT    Recommendations:  This patient would benefit from continued therapy.     Frequency:  1 X week, once daily  Duration:  for 8 weeks        Please refer to the daily flowsheet for treatment today, total treatment time and time spent performing 1:1 timed codes.

## 2021-04-15 ENCOUNTER — IMMUNIZATION (OUTPATIENT)
Dept: FAMILY MEDICINE | Facility: CLINIC | Age: 36
End: 2021-04-15
Payer: COMMERCIAL

## 2021-04-15 PROCEDURE — 0011A PR COVID VAC MODERNA 100 MCG/0.5 ML IM: CPT

## 2021-04-15 PROCEDURE — 91301 PR COVID VAC MODERNA 100 MCG/0.5 ML IM: CPT

## 2021-04-21 ENCOUNTER — THERAPY VISIT (OUTPATIENT)
Dept: PHYSICAL THERAPY | Facility: CLINIC | Age: 36
End: 2021-04-21
Payer: COMMERCIAL

## 2021-04-21 DIAGNOSIS — Z47.89 AFTERCARE FOLLOWING SURGERY OF THE MUSCULOSKELETAL SYSTEM, NEC: ICD-10-CM

## 2021-04-21 DIAGNOSIS — G89.29 CHRONIC LEFT SHOULDER PAIN: ICD-10-CM

## 2021-04-21 DIAGNOSIS — M25.512 CHRONIC LEFT SHOULDER PAIN: ICD-10-CM

## 2021-04-21 NOTE — PROGRESS NOTES
PROGRESS  REPORT    Progress reporting period is from 3/10/2021 to 4/21/2021.       SUBJECTIVE  Subjective: Patient reports good overall improvement.  Currently reports 0/10 pain.  Notes intermittent soreness that improves with exercises.  Also reports some discomfort when trying to sleep supine.    Current Pain level: 0/10.     Initial Pain level: 3/10.   Changes in function:  Yes (See Goal flowsheet attached for changes in current functional level)  Adverse reaction to treatment or activity: None    OBJECTIVE    Objective: PROM: flexion-140 degrees, abduction/scaption-130 degrees, ER-40 degrees.  AAROM: flexion-128 degrees, abduction/scaption-125 degrees, ER-40 degrees.     ASSESSMENT/PLAN  Updated problem list and treatment plan: Diagnosis 1:  S/p L shoulder hemiarthroplasty  Pain -  manual therapy and home program  Decreased ROM/flexibility - manual therapy, therapeutic exercise and home program  Decreased function - therapeutic activities and home program  STG/LTGs have been met or progress has been made towards goals:  Yes (See Goal flow sheet completed today.)  Assessment of Progress: The patient's condition is improving.  Self Management Plans:  Patient has been instructed in a home treatment program.  I have re-evaluated this patient and find that the nature, scope, duration and intensity of the therapy is appropriate for the medical condition of the patient.  Arpita continues to require the following intervention to meet STG and LTG's:  PT    Recommendations:  This patient would benefit from continued therapy.     Frequency:  1 X week, once daily  Duration:  for 5 weeks        Please refer to the daily flowsheet for treatment today, total treatment time and time spent performing 1:1 timed codes.

## 2021-04-22 ENCOUNTER — OFFICE VISIT (OUTPATIENT)
Dept: ORTHOPEDICS | Facility: CLINIC | Age: 36
End: 2021-04-22

## 2021-04-22 DIAGNOSIS — M87.022 AVASCULAR NECROSIS OF LEFT HUMERAL HEAD (H): Primary | ICD-10-CM

## 2021-04-22 PROCEDURE — 99024 POSTOP FOLLOW-UP VISIT: CPT | Performed by: ORTHOPAEDIC SURGERY

## 2021-04-22 NOTE — LETTER
Arpita CONNIE Zafar     1985  Encounter date/time:  04/22/21       Report of Workability    Physician:  Steffanie Stockton MD     Diagnosis:  Status post left shoulder surgery     Limitations:  No shoulder height or overhead work with left arm.  No away from body lift, carry with more than 10 pounds.     Return to work status: Return to work 5/10/21 with above restrictions     Follow-Up:   Return to clinic in 6 weeks.

## 2021-04-22 NOTE — NURSING NOTE
Reason For Visit:   Chief Complaint   Patient presents with     Surgical Followup     6 wks S/p Left humeral head resurfacing arthroplasty, sx: 3/9/21        PCP: Micah Bridges  .  Date of surgery: 3/9/21  Type of surgery: Left shoulder hemiarthroplasty.  Smoker: No  Request smoking cessation information: No    Right hand dominant    SANE score  Affected shoulder: Left  Right shoulder SANE: 100  Left shoulder SANE: 50    There were no vitals taken for this visit.      Pain Assessment  Patient Currently in Pain: Charis Lowery, ATC

## 2021-04-22 NOTE — LETTER
4/22/2021         RE: Arpita Zafar  [address]  [place] [address]        Dear Colleague,    Thank you for referring your patient, Arpita Zafar, to the [facility]. Please see a copy of my visit note below.    CHIEF CONCERN: Status post left shoulder hemiarthroplasty for AVN  DATE OF SURGERY: 3/9/21    HISTORY OF PRESENT ILLNESS: Arpita is an extremely pleasant 36 year-old woman who is 6 weeks status post the above procedure. She has been doing PT and making progress with her motion. She has been compliant with sling.     EXAM:  Pleasant adult female in NAD  Respirations even and unlabored.  Left upper extremity: Distally neurovascularly intact without deficits. Shoulder range of motion is assisted FE to 140 and ER at side to 50    IMAGING: None new    ASSESSMENT:  1. Six weeks status post above procedure    PLAN:    Rehab: Progress active ROM of the shoulder per operative note. May begin light strengthening     Sling: Discontinue sling     Pain medication:  NSAIDs and Tylenol PRN    WORK: workability note completed    Follow up: in 6 weeks with new XRs.            Again, thank you for allowing me to participate in the care of your patient.        Sincerely,        Steffanie Stockton MD

## 2021-04-22 NOTE — PROGRESS NOTES
CHIEF CONCERN: Status post left shoulder hemiarthroplasty for AVN  DATE OF SURGERY: 3/9/21    HISTORY OF PRESENT ILLNESS: Arpita is an extremely pleasant 36 year-old woman who is 6 weeks status post the above procedure. She has been doing PT and making progress with her motion. She has been compliant with sling.     EXAM:  Pleasant adult female in NAD  Respirations even and unlabored.  Left upper extremity: Distally neurovascularly intact without deficits. Shoulder range of motion is assisted FE to 140 and ER at side to 50    IMAGING: None new    ASSESSMENT:  1. Six weeks status post above procedure    PLAN:    Rehab: Progress active ROM of the shoulder per operative note. May begin light strengthening     Sling: Discontinue sling     Pain medication:  NSAIDs and Tylenol PRN    WORK: workability note completed    Follow up: in 6 weeks with new XRs.

## 2021-04-23 ENCOUNTER — MYC MEDICAL ADVICE (OUTPATIENT)
Dept: ORTHOPEDICS | Facility: CLINIC | Age: 36
End: 2021-04-23

## 2021-04-25 ENCOUNTER — HEALTH MAINTENANCE LETTER (OUTPATIENT)
Age: 36
End: 2021-04-25

## 2021-04-28 ENCOUNTER — THERAPY VISIT (OUTPATIENT)
Dept: PHYSICAL THERAPY | Facility: CLINIC | Age: 36
End: 2021-04-28
Payer: COMMERCIAL

## 2021-04-28 DIAGNOSIS — M25.512 CHRONIC LEFT SHOULDER PAIN: ICD-10-CM

## 2021-04-28 DIAGNOSIS — Z47.89 AFTERCARE FOLLOWING SURGERY OF THE MUSCULOSKELETAL SYSTEM, NEC: ICD-10-CM

## 2021-04-28 DIAGNOSIS — G89.29 CHRONIC LEFT SHOULDER PAIN: ICD-10-CM

## 2021-04-28 PROCEDURE — 99207 PR STAT THERAPEUTIC EXERCISES - IAM: CPT | Performed by: PHYSICAL THERAPIST

## 2021-04-28 PROCEDURE — 99207 PR STAT MANUAL THER TECH, 1+ REGIONS, EA 15 MIN - IAM: CPT | Performed by: PHYSICAL THERAPIST

## 2021-05-12 ENCOUNTER — THERAPY VISIT (OUTPATIENT)
Dept: PHYSICAL THERAPY | Facility: CLINIC | Age: 36
End: 2021-05-12
Payer: COMMERCIAL

## 2021-05-12 DIAGNOSIS — Z47.89 AFTERCARE FOLLOWING SURGERY OF THE MUSCULOSKELETAL SYSTEM, NEC: ICD-10-CM

## 2021-05-12 DIAGNOSIS — G89.29 CHRONIC LEFT SHOULDER PAIN: ICD-10-CM

## 2021-05-12 DIAGNOSIS — M25.512 CHRONIC LEFT SHOULDER PAIN: ICD-10-CM

## 2021-05-12 PROCEDURE — 99207 PR STAT THERAPEUTIC EXERCISES - IAM: CPT | Performed by: PHYSICAL THERAPIST

## 2021-05-13 ENCOUNTER — IMMUNIZATION (OUTPATIENT)
Dept: FAMILY MEDICINE | Facility: CLINIC | Age: 36
End: 2021-05-13
Attending: FAMILY MEDICINE
Payer: COMMERCIAL

## 2021-05-13 PROCEDURE — 91301 PR COVID VAC MODERNA 100 MCG/0.5 ML IM: CPT

## 2021-05-13 PROCEDURE — 0012A PR COVID VAC MODERNA 100 MCG/0.5 ML IM: CPT

## 2021-05-26 ENCOUNTER — TELEPHONE (OUTPATIENT)
Dept: FAMILY MEDICINE | Facility: CLINIC | Age: 36
End: 2021-05-26

## 2021-05-26 NOTE — TELEPHONE ENCOUNTER
This writer attempted to contact patient on 05/26/21      Reason for call patient can come into clinic and unable to leave message. Mailbox is full.      If patient calls back:   2nd floor [place] Care Team (MA/TC) called. Inform patient that someone from the team will contact them, document that pt called and route to care team.         Joshua Jacques MA

## 2021-05-26 NOTE — TELEPHONE ENCOUNTER
Reason for Call:  Other appointment    Detailed comments: Patient has an appointment on Wednesday June 2nd with Enoch for an ongoing cough. Patient has had negative covid results but it still has not gone away. Please advise if appointment needs to be changed to a virtual visit.     Phone Number Patient can be reached at: Home number on file [phone] (home)    Best Time: any    Can we leave a detailed message on this number? YES    Call taken on 5/26/2021 at 10:29 AM by Pete Wyatt

## 2021-05-28 ENCOUNTER — THERAPY VISIT (OUTPATIENT)
Dept: PHYSICAL THERAPY | Facility: CLINIC | Age: 36
End: 2021-05-28
Payer: COMMERCIAL

## 2021-05-28 DIAGNOSIS — Z47.89 AFTERCARE FOLLOWING SURGERY OF THE MUSCULOSKELETAL SYSTEM, NEC: ICD-10-CM

## 2021-05-28 DIAGNOSIS — G89.29 CHRONIC LEFT SHOULDER PAIN: ICD-10-CM

## 2021-05-28 DIAGNOSIS — M25.512 CHRONIC LEFT SHOULDER PAIN: ICD-10-CM

## 2021-05-28 PROCEDURE — 99207 PR STAT THERAPEUTIC EXERCISES - IAM: CPT | Performed by: PHYSICAL THERAPIST

## 2021-05-28 PROCEDURE — 99207 PR STAT MANUAL THER TECH, 1+ REGIONS, EA 15 MIN - IAM: CPT | Performed by: PHYSICAL THERAPIST

## 2021-05-28 NOTE — TELEPHONE ENCOUNTER
This writer attempted to contact patient on 05/28/21      Reason for call inform patient can come into clinic and unable to leave message. Mailbox is full.      If patient calls back:   1st floor [facility] Care Team (MA/TC) called. Inform patient that someone from the team will contact them, document that pt called and route to care team.         Joshua Jcaques MA

## 2021-05-28 NOTE — PROGRESS NOTES
Subjective:  HPI  Physical Exam                    Objective:  System    Physical Exam    General     ROS    Assessment/Plan:    PROGRESS  REPORT    Progress reporting period is from 4/14/21 to 5/28/21.       SUBJECTIVE  Patient reports no issues with her shoulder. her elbow was sore a little bit but not today. Overall no issues.    Current Pain level: 0/10.     Initial Pain level: 3/10.   Changes in function:  Yes (See Goal flowsheet attached for changes in current functional level)  Adverse reaction to treatment or activity: None    OBJECTIVE  AROM flex 130 scapular winging, abd 160, ER 70 deg.      ASSESSMENT/PLAN  Updated problem list and treatment plan: Diagnosis 1:  Left shoulder pain s/p jane arthroplasty  Decreased ROM/flexibility - manual therapy, therapeutic exercise, therapeutic activity and home program  Decreased joint mobility - manual therapy, therapeutic exercise, therapeutic activity and home program  Decreased strength - therapeutic exercise, therapeutic activities and home program  Decreased function - therapeutic activities and home program  STG/LTGs have been met or progress has been made towards goals:  Yes (See Goal flow sheet completed today.)  Assessment of Progress: The patient's condition is improving.  Self Management Plans:  Patient is independent in a home treatment program.  I have re-evaluated this patient and find that the nature, scope, duration and intensity of the therapy is appropriate for the medical condition of the patient.  Arpita continues to require the following intervention to meet STG and LTG's:  PT    Recommendations:  This patient would benefit from continued therapy.     Frequency:  2 X a month, once daily  Duration:  for 2 months        Please refer to the daily flowsheet for treatment today, total treatment time and time spent performing 1:1 timed codes.

## 2021-06-02 ENCOUNTER — ANCILLARY PROCEDURE (OUTPATIENT)
Dept: ULTRASOUND IMAGING | Facility: CLINIC | Age: 36
End: 2021-06-02
Attending: NURSE PRACTITIONER
Payer: COMMERCIAL

## 2021-06-02 ENCOUNTER — OFFICE VISIT (OUTPATIENT)
Dept: FAMILY MEDICINE | Facility: CLINIC | Age: 36
End: 2021-06-02
Payer: COMMERCIAL

## 2021-06-02 VITALS
BODY MASS INDEX: 26.33 KG/M2 | SYSTOLIC BLOOD PRESSURE: 123 MMHG | DIASTOLIC BLOOD PRESSURE: 86 MMHG | HEART RATE: 71 BPM | HEIGHT: 65 IN | TEMPERATURE: 97.6 F | WEIGHT: 158 LBS | OXYGEN SATURATION: 98 % | RESPIRATION RATE: 18 BRPM

## 2021-06-02 DIAGNOSIS — I10 HYPERTENSION, UNSPECIFIED TYPE: ICD-10-CM

## 2021-06-02 DIAGNOSIS — Z31.69 ENCOUNTER FOR PRECONCEPTION CONSULTATION: ICD-10-CM

## 2021-06-02 DIAGNOSIS — R05.9 COUGH: Primary | ICD-10-CM

## 2021-06-02 PROCEDURE — 93975 VASCULAR STUDY: CPT | Performed by: RADIOLOGY

## 2021-06-02 PROCEDURE — 99213 OFFICE O/P EST LOW 20 MIN: CPT | Performed by: NURSE PRACTITIONER

## 2021-06-02 RX ORDER — BENZONATATE 200 MG/1
200 CAPSULE ORAL 3 TIMES DAILY PRN
Qty: 21 CAPSULE | Refills: 0 | Status: SHIPPED | OUTPATIENT
Start: 2021-06-02 | End: 2021-06-10

## 2021-06-02 RX ORDER — CODEINE PHOSPHATE/GUAIFENESIN 10-100MG/5
5 LIQUID (ML) ORAL EVERY 4 HOURS PRN
Qty: 236 ML | Refills: 0 | Status: SHIPPED | OUTPATIENT
Start: 2021-06-02 | End: 2021-06-10

## 2021-06-02 ASSESSMENT — MIFFLIN-ST. JEOR: SCORE: 1412.56

## 2021-06-02 ASSESSMENT — PAIN SCALES - GENERAL: PAINLEVEL: NO PAIN (0)

## 2021-06-02 NOTE — PROGRESS NOTES
"    Assessment & Plan     Cough  Can use below for symptoms management.  No sign of infection on exam.  Could be PND (advised started flonase for a couple of weeks) versus lisinopril side effect.   - benzonatate (TESSALON) 200 MG capsule; Take 1 capsule (200 mg) by mouth 3 times daily as needed for cough  - guaiFENesin-codeine (GUAIFENESIN AC) 100-10 MG/5ML syrup; Take 5 mLs by mouth every 4 hours as needed for cough    Hypertension, unspecified type  Stop Lisinopril for possible ACEI cough and due to desire to conceive.  Switch to below.  Follow up via [facility] [facility] in one week with home readings.   - NIFEdipine ER (ADALAT CC) 60 MG 24 hr tablet; Take 1 tablet (60 mg) by mouth daily    Encounter for preconception consultation  - OB/GYN REFERRAL    Prescription drug management  20 minutes spent on the date of the encounter doing chart review, history and exam, documentation and further activities per the note       BMI:   Estimated body mass index is 26.29 kg/m  as calculated from the following:    Height as of this encounter: 1.651 m (5' 5\").    Weight as of this encounter: 71.7 kg (158 lb).       Patient Instructions   Stop Lisinopril  Start Nifedipine.      Return in about 1 week (around 6/9/2021) for Follow up via [facility] [facility] on home bp readings.    SALAS Singletary CNP  [facility]    Jerri Palm is a 36 year old who presents for the following health issues     HPI     Acute Illness  Acute illness concerns: Cough  Onset/Duration: 3 weeks   Symptoms:  Fever: no  Chills/Sweats: no  Headache (location?): no  Sinus Pressure: no  Conjunctivitis:  no  Ear Pain: no  Rhinorrhea: no  Congestion: no  Sore Throat: no  Cough: YES - dry and productive  Wheeze: no  Decreased Appetite: no  Nausea: no  Vomiting: no  Diarrhea: no  Dysuria/Freq.: no  Dysuria or Hematuria: no  Fatigue/Achiness: no  Sick/Strep Exposure: no  Medication: YES rule out if related to Lisinopril medication  Therapies " "tried and outcome: None  Dry cough prior to going to [place].  Then she went there and got a cold with funny nose, cough, congestion.  Congestion resolved around 5/14.  Still with lingering cough.  Worse at night.  Affecting sleep.  She denies shortness of breath, wheezing, chest pain.      She is wondering about LIsinopril s/e.  Would like to switch anyway as she and spouse would like to conceive soon.       Review of Systems   Constitutional, HEENT, cardiovascular, pulmonary, gi and gu systems are negative, except as otherwise noted.      Objective    /86 (BP Location: Left arm, Patient Position: Sitting, Cuff Size: Adult Regular)   Pulse 71   Temp 97.6  F (36.4  C) (Tympanic)   Resp 18   Ht 1.651 m (5' 5\")   Wt 71.7 kg (158 lb)   LMP 05/31/2021 (Exact Date)   SpO2 98%   BMI 26.29 kg/m    Body mass index is 26.29 kg/m .  Physical Exam   GENERAL: healthy, alert and no distress  HENT: normal cephalic/atraumatic, ear canals and TM's normal, nose and mouth without ulcers or lesions, nasal mucosa edematous , oropharynx clear and oral mucous membranes moist  NECK: no adenopathy, no asymmetry, masses, or scars and thyroid normal to palpation  RESP: lungs clear to auscultation - no rales, rhonchi or wheezes  CV: regular rate and rhythm, normal S1 S2, no S3 or S4, no murmur, click or rub, no peripheral edema and peripheral pulses strong  ABDOMEN: soft, nontender, no hepatosplenomegaly, no masses and bowel sounds normal  MS: no gross musculoskeletal defects noted, no edema              "

## 2021-06-08 NOTE — TELEPHONE ENCOUNTER
FYI to provider - Patient is lost to pap tracking follow-up. Attempts to contact pt have been made per reminder process and there has been no reply and/or no appt scheduled.       8/16/17 NIL pap, + HR HPV (not 16 or 18). Plan: cotest in 1 year.  10/31/18 ASC-H pap, + HR HPV (not 16 or 18). Plan colp.  11/15/18 Dix: ESTELA 2, Pap: [facility]-H. Plan: LEEP  1/15/19 LEEP bx: ESTELA 2 with negative margins. Plan: review at f/u visit.   2/27/19 F/U visit: Plan: cotest at 12 and 24 mo.   3/5/20 NIL pap, neg HPV. Plan: cotest in 1 year  2/22/21 Reminder MyChart  3/23/21 Reminder call - spoke to pt.   6/7/21 Lost to follow up

## 2021-06-09 DIAGNOSIS — Z96.612 STATUS POST LEFT SHOULDER HEMIARTHROPLASTY: Primary | ICD-10-CM

## 2021-06-10 ENCOUNTER — OFFICE VISIT (OUTPATIENT)
Dept: ORTHOPEDICS | Facility: CLINIC | Age: 36
End: 2021-06-10
Payer: COMMERCIAL

## 2021-06-10 ENCOUNTER — THERAPY VISIT (OUTPATIENT)
Dept: PHYSICAL THERAPY | Facility: CLINIC | Age: 36
End: 2021-06-10
Payer: COMMERCIAL

## 2021-06-10 ENCOUNTER — ANCILLARY PROCEDURE (OUTPATIENT)
Dept: GENERAL RADIOLOGY | Facility: CLINIC | Age: 36
End: 2021-06-10
Attending: ORTHOPAEDIC SURGERY
Payer: COMMERCIAL

## 2021-06-10 VITALS — SYSTOLIC BLOOD PRESSURE: 141 MMHG | DIASTOLIC BLOOD PRESSURE: 101 MMHG | HEART RATE: 93 BPM | OXYGEN SATURATION: 97 %

## 2021-06-10 DIAGNOSIS — Z96.612 STATUS POST LEFT SHOULDER HEMIARTHROPLASTY: ICD-10-CM

## 2021-06-10 DIAGNOSIS — Z96.612 STATUS POST LEFT SHOULDER HEMIARTHROPLASTY: Primary | ICD-10-CM

## 2021-06-10 DIAGNOSIS — G89.29 CHRONIC LEFT SHOULDER PAIN: ICD-10-CM

## 2021-06-10 DIAGNOSIS — Z47.89 AFTERCARE FOLLOWING SURGERY OF THE MUSCULOSKELETAL SYSTEM, NEC: ICD-10-CM

## 2021-06-10 DIAGNOSIS — M25.512 CHRONIC LEFT SHOULDER PAIN: ICD-10-CM

## 2021-06-10 PROCEDURE — 73030 X-RAY EXAM OF SHOULDER: CPT | Mod: LT | Performed by: RADIOLOGY

## 2021-06-10 PROCEDURE — 99207 PR STAT THERAPEUTIC EXERCISES - IAM: CPT | Performed by: PHYSICAL THERAPIST

## 2021-06-10 PROCEDURE — 99207 PR STAT MANUAL THER TECH, 1+ REGIONS, EA 15 MIN - IAM: CPT | Performed by: PHYSICAL THERAPIST

## 2021-06-10 PROCEDURE — 99212 OFFICE O/P EST SF 10 MIN: CPT | Performed by: ORTHOPAEDIC SURGERY

## 2021-06-10 ASSESSMENT — PAIN SCALES - GENERAL: PAINLEVEL: NO PAIN (0)

## 2021-06-10 NOTE — LETTER
6/10/2021         RE: Arpita Zafar  [address]  [place] [address]        Dear Colleague,    Thank you for referring your patient, Arpita Zafar, to the [facility]. Please see a copy of my visit note below.    CHIEF CONCERN: Status post left shoulder hemiarthroplasty for AVN  DATE OF SURGERY: 3/9/21    HISTORY OF PRESENT ILLNESS: Arpita is an extremely pleasant 36 year-old woman who is 3 months status post the above procedure. Still doing PT. Progressing well    EXAM:  Pleasant adult female in NAD  Respirations even and unlabored.  Left upper extremity: Distally neurovascularly intact without deficits. Shoulder range of motion is active FE to 170 and ER at side to 70, abd to 120 and IR to L2.    IMAGING:   Left shoulder XRs demonstrate component in good position    ASSESSMENT:  1. Three months status post above procedure    PLAN:  Range of Motion: Active ROM of the shoulder and strengthening. May do IR up the back as tolerated.  Work: note provided  Pain medication: NSAIDs and Tylenol PRN  Follow up: in 3 months.              Again, thank you for allowing me to participate in the care of your patient.        Sincerely,        Steffanie Stockton MD

## 2021-06-10 NOTE — LETTER
Arpita CONNIE Zafar     1985  Encounter date/time:  06/10/21       Report of Workability    Physician:  Steffanie Stockton MD     Diagnosis:  Status post left shoulder surgery     Limitations:  No shoulder height or overhead work with more than 1 pound left arm.  No away from body lift, carry with more than 10 pounds.     Return to work status: May work with above restrictions     Follow-Up:   Return to clinic in 8 weeks.

## 2021-06-10 NOTE — NURSING NOTE
Reason For Visit: 13 weeks S/p Left humeral head resurfacing arthroplasty, sx: 3/9/21 (RAP)    Right hand dominant    SANE score  Affected shoulder: Left  Right shoulder SANE: 100  Left shoulder SANE: 80    BP (!) 141/101   Pulse 93   LMP 05/31/2021 (Exact Date)   SpO2 97%

## 2021-06-11 NOTE — PROGRESS NOTES
CHIEF CONCERN: Status post left shoulder hemiarthroplasty for AVN  DATE OF SURGERY: 3/9/21    HISTORY OF PRESENT ILLNESS: Arpita is an extremely pleasant 36 year-old woman who is 3 months status post the above procedure. Still doing PT. Progressing well    EXAM:  Pleasant adult female in NAD  Respirations even and unlabored.  Left upper extremity: Distally neurovascularly intact without deficits. Shoulder range of motion is active FE to 170 and ER at side to 70, abd to 120 and IR to L2.    IMAGING:   Left shoulder XRs demonstrate component in good position    ASSESSMENT:  1. Three months status post above procedure    PLAN:  Range of Motion: Active ROM of the shoulder and strengthening. May do IR up the back as tolerated.  Work: note provided  Pain medication: NSAIDs and Tylenol PRN  Follow up: in 3 months.

## 2021-07-02 ENCOUNTER — THERAPY VISIT (OUTPATIENT)
Dept: PHYSICAL THERAPY | Facility: CLINIC | Age: 36
End: 2021-07-02
Payer: COMMERCIAL

## 2021-07-02 DIAGNOSIS — M25.512 CHRONIC LEFT SHOULDER PAIN: ICD-10-CM

## 2021-07-02 DIAGNOSIS — G89.29 CHRONIC LEFT SHOULDER PAIN: ICD-10-CM

## 2021-07-02 DIAGNOSIS — Z47.89 AFTERCARE FOLLOWING SURGERY OF THE MUSCULOSKELETAL SYSTEM, NEC: ICD-10-CM

## 2021-07-02 PROCEDURE — 99207 PR STAT THERAPEUTIC EXERCISES - IAM: CPT | Performed by: PHYSICAL THERAPIST

## 2021-07-09 ENCOUNTER — THERAPY VISIT (OUTPATIENT)
Dept: PHYSICAL THERAPY | Facility: CLINIC | Age: 36
End: 2021-07-09
Payer: COMMERCIAL

## 2021-07-09 DIAGNOSIS — Z47.89 AFTERCARE FOLLOWING SURGERY OF THE MUSCULOSKELETAL SYSTEM, NEC: ICD-10-CM

## 2021-07-09 DIAGNOSIS — G89.29 CHRONIC LEFT SHOULDER PAIN: ICD-10-CM

## 2021-07-09 DIAGNOSIS — M25.512 CHRONIC LEFT SHOULDER PAIN: ICD-10-CM

## 2021-07-09 PROCEDURE — 99207 PR STAT THERAPEUTIC EXERCISES - IAM: CPT | Performed by: PHYSICAL THERAPIST

## 2021-07-12 LAB
HPV ABSTRACT: NORMAL
PAP-ABSTRACT: NORMAL
TSH SERPL-ACNC: 1.42 UIU/ML (ref 0.45–4.5)

## 2021-07-23 ENCOUNTER — THERAPY VISIT (OUTPATIENT)
Dept: PHYSICAL THERAPY | Facility: CLINIC | Age: 36
End: 2021-07-23
Payer: COMMERCIAL

## 2021-07-23 DIAGNOSIS — Z47.89 AFTERCARE FOLLOWING SURGERY OF THE MUSCULOSKELETAL SYSTEM, NEC: ICD-10-CM

## 2021-07-23 DIAGNOSIS — M25.512 CHRONIC LEFT SHOULDER PAIN: ICD-10-CM

## 2021-07-23 DIAGNOSIS — G89.29 CHRONIC LEFT SHOULDER PAIN: ICD-10-CM

## 2021-07-23 PROCEDURE — 99207 PR STAT THERAPEUTIC EXERCISES - IAM: CPT | Performed by: PHYSICAL THERAPIST

## 2021-07-23 NOTE — PROGRESS NOTES
Subjective:  HPI  Physical Exam                    Objective:  System    Physical Exam    General     ROS    Assessment/Plan:    PROGRESS  REPORT    Progress reporting period is from 7/2/21 to 7/23/21.       SUBJECTIVE  No issues shoulder is feeling good. The only thing that feels irregular is reaching out to the side back behind her. Day to day life she does not notice her arm. would like to be unrestricted on wants to get back into lifetime fitness.     Current Pain level: 0/10.     Initial Pain level: 3/10.   Changes in function:  Yes (See Goal flowsheet attached for changes in current functional level)  Adverse reaction to treatment or activity: None    OBJECTIVE  Changes noted in objective findings:  Yes,  flex 145, abd 155, ER 76, ext/IR T7, flex 4/5, abd 3+/5, ER 4/5, IR 4/5. pt ROM about 90% does not limit day to day life. Continued weakness but independent with HEP.      ASSESSMENT/PLAN  Updated problem list and treatment plan: Diagnosis 1:  Left shoulder pain s/p hemiarthroplasty  Decreased ROM/flexibility - manual therapy, therapeutic exercise, therapeutic activity and home program  Decreased joint mobility - manual therapy, therapeutic exercise, therapeutic activity and home program  Decreased strength - therapeutic exercise, therapeutic activities and home program  STG/LTGs have been met or progress has been made towards goals:  Yes (See Goal flow sheet completed today.)  Assessment of Progress: The patient's condition is improving.  Self Management Plans:  Patient is independent in a home treatment program.  I have re-evaluated this patient and find that the nature, scope, duration and intensity of the therapy is appropriate for the medical condition of the patient.  Arpita continues to require the following intervention to meet STG and LTG's:  PT    Recommendations:  This patient would benefit from continued therapy.     Frequency:  1 X a month, once daily  Duration:  for 2 months.   Plan for pt to  continue with HEP strengthening and return to clinic with question if necessary in 1 month.         Please refer to the daily flowsheet for treatment today, total treatment time and time spent performing 1:1 timed codes.

## 2021-07-27 ENCOUNTER — TRANSFERRED RECORDS (OUTPATIENT)
Dept: HEALTH INFORMATION MANAGEMENT | Facility: CLINIC | Age: 36
End: 2021-07-27

## 2021-08-02 ENCOUNTER — OFFICE VISIT (OUTPATIENT)
Dept: ORTHOPEDICS | Facility: CLINIC | Age: 36
End: 2021-08-02
Payer: COMMERCIAL

## 2021-08-02 DIAGNOSIS — Z96.612 STATUS POST LEFT SHOULDER HEMIARTHROPLASTY: Primary | ICD-10-CM

## 2021-08-02 PROCEDURE — 99213 OFFICE O/P EST LOW 20 MIN: CPT | Performed by: ORTHOPAEDIC SURGERY

## 2021-08-02 RX ORDER — LABETALOL 200 MG/1
TABLET, FILM COATED ORAL
COMMUNITY
Start: 2021-07-12 | End: 2022-03-14

## 2021-08-02 NOTE — LETTER
Arpita Zafar     1985  Encounter date/time:  08/02/21       Report of Workability    Physician:  Steffanie Stockton MD     Diagnosis:  Status post left shoulder surgery     Limitations:  No lifting more than 40 pounds with left arm.     Return to work status: May work with above restrictions     Follow-Up:   Return to clinic in 1 year

## 2021-08-02 NOTE — PROGRESS NOTES
CHIEF CONCERN: Status post left shoulder hemiarthroplasty for AVN  DATE OF SURGERY: 3/9/21    HISTORY OF PRESENT ILLNESS: Arpita is an extremely pleasant 36 year-old woman who is 6 months status post the above procedure. She is very pleased with her shoulder and pain is markedly improved from preop.    EXAM:  Pleasant adult female in NAD  Respirations even and unlabored.  Left upper extremity: Distally neurovascularly intact without deficits. Shoulder range of motion is active FE to 170 and ER at side to 70, abd to 120 and IR to T12.    IMAGING:   Left shoulder XRs demonstrate component in good position    ASSESSMENT:  1. Three months status post above procedure    PLAN:  Range of Motion: Active ROM of the shoulder and strengthening. May do IR up the back as tolerated.  Work: note provided  Pain medication: NSAIDs and Tylenol PRN  Follow up: in 3 months.

## 2021-08-02 NOTE — LETTER
8/2/2021         RE: Arpita Zafar  [address]  [place] [address]        Dear Colleague,    Thank you for referring your patient, Arpita Zafar, to the [facility]. Please see a copy of my visit note below.    CHIEF CONCERN: Status post left shoulder hemiarthroplasty for AVN  DATE OF SURGERY: 3/9/21    HISTORY OF PRESENT ILLNESS: Arpita is an extremely pleasant 36 year-old woman who is 6 months status post the above procedure. She is very pleased with her shoulder and pain is markedly improved from preop.    EXAM:  Pleasant adult female in NAD  Respirations even and unlabored.  Left upper extremity: Distally neurovascularly intact without deficits. Shoulder range of motion is active FE to 170 and ER at side to 70, abd to 120 and IR to T12.    IMAGING:   Left shoulder XRs demonstrate component in good position    ASSESSMENT:  1. Three months status post above procedure    PLAN:  Range of Motion: Active ROM of the shoulder and strengthening. May do IR up the back as tolerated.  Work: note provided  Pain medication: NSAIDs and Tylenol PRN  Follow up: in 3 months.              Again, thank you for allowing me to participate in the care of your patient.        Sincerely,        Steffanie Stockton MD

## 2021-08-02 NOTE — NURSING NOTE
Reason For Visit:   Chief Complaint   Patient presents with     RECHECK     5 months clinic f/u, S/p Left humeral head resurfacing arthroplasty, sx: 3/9/21 (RAP)          PCP: Micah Bridges  .  Date of surgery: 3/9/21  Type of surgery: Left shoulder hemiarthroplasty.  Smoker: No  Request smoking cessation information: No     Right hand dominant     SANE score  Affected shoulder: Left  Right shoulder SANE: 100  Left shoulder SANE: 95    There were no vitals taken for this visit.           Ifeoma Best, ATC

## 2021-08-05 PROBLEM — M25.512 LEFT SHOULDER PAIN: Status: RESOLVED | Noted: 2021-03-16 | Resolved: 2021-08-05

## 2021-08-05 PROBLEM — M25.512 CHRONIC LEFT SHOULDER PAIN: Status: RESOLVED | Noted: 2021-05-28 | Resolved: 2021-08-05

## 2021-08-05 PROBLEM — G89.29 CHRONIC LEFT SHOULDER PAIN: Status: RESOLVED | Noted: 2021-05-28 | Resolved: 2021-08-05

## 2021-08-05 PROBLEM — Z47.89 AFTERCARE FOLLOWING SURGERY OF THE MUSCULOSKELETAL SYSTEM, NEC: Status: RESOLVED | Noted: 2021-03-16 | Resolved: 2021-08-05

## 2021-08-30 ENCOUNTER — OFFICE VISIT (OUTPATIENT)
Dept: FAMILY MEDICINE | Facility: CLINIC | Age: 36
End: 2021-08-30
Payer: COMMERCIAL

## 2021-08-30 VITALS
HEIGHT: 65 IN | TEMPERATURE: 98.2 F | WEIGHT: 157.5 LBS | RESPIRATION RATE: 14 BRPM | BODY MASS INDEX: 26.24 KG/M2 | DIASTOLIC BLOOD PRESSURE: 80 MMHG | HEART RATE: 77 BPM | SYSTOLIC BLOOD PRESSURE: 128 MMHG | OXYGEN SATURATION: 96 %

## 2021-08-30 DIAGNOSIS — L60.8 TOENAIL DEFORMITY: ICD-10-CM

## 2021-08-30 DIAGNOSIS — B35.1 ONYCHOMYCOSIS: Primary | ICD-10-CM

## 2021-08-30 PROCEDURE — 99213 OFFICE O/P EST LOW 20 MIN: CPT | Performed by: PHYSICIAN ASSISTANT

## 2021-08-30 RX ORDER — TERBINAFINE HYDROCHLORIDE 250 MG/1
250 TABLET ORAL DAILY
Qty: 90 TABLET | Refills: 0 | Status: SHIPPED | OUTPATIENT
Start: 2021-08-30 | End: 2021-11-28

## 2021-08-30 ASSESSMENT — ENCOUNTER SYMPTOMS
NERVOUS/ANXIOUS: 0
FEVER: 0
ARTHRALGIAS: 0

## 2021-08-30 ASSESSMENT — MIFFLIN-ST. JEOR: SCORE: 1410.3

## 2021-08-30 NOTE — PROGRESS NOTES
"    Assessment & Plan     Onychomycosis  Toenail deformity  Patient is a 36-year-old female who presents to clinic due to significant changes of left great toenail over the last 3 to 4 months including thickening and curling of edges.  Exam findings are consistent with onychomycosis of left great toenail creating deformity.  No current signs of paronychia or inflammation caused by mildly ingrown edges.  Discussed treatment options including oral terbinafine x3 months or referral to podiatry for complete nail removal.  Patient would like to avoid complete removal and elects to proceed with oral terbinafine.  Discussed the importance of avoiding pregnancy during this treatment timeframe and risk of needing removal following treatment if fungal infection does not resolve.  Patient verbalized understanding.    - terbinafine (LAMISIL) 250 MG tablet; Take 1 tablet (250 mg) by mouth daily     See Patient Instructions    Return in about 3 months (around 11/30/2021), or if symptoms worsen or fail to improve.    [facility] ISABEL Palm is a 36 year old who presents for the following health issues     HPI     Patient notes approximately 3 to 4 months of sudden left great toenail deformity including thickening and curling on edges bilaterally.  Area is intermittently painful. No drainage. No fevers.  No current redness or swelling.       Review of Systems   Constitutional: Negative for fever.   Musculoskeletal: Negative for arthralgias.   Skin: Negative for rash.   Psychiatric/Behavioral: The patient is not nervous/anxious.             Objective    /80   Pulse 77   Temp 98.2  F (36.8  C) (Tympanic)   Resp 14   Ht 1.651 m (5' 5\")   Wt 71.4 kg (157 lb 8 oz)   SpO2 96%   BMI 26.21 kg/m    Body mass index is 26.21 kg/m .  Physical Exam  Vitals and nursing note reviewed.   Constitutional:       General: She is not in acute distress.     Appearance: Normal appearance. "   HENT:      Head: Normocephalic and atraumatic.   Eyes:      Extraocular Movements: Extraocular movements intact.      Pupils: Pupils are equal, round, and reactive to light.   Cardiovascular:      Rate and Rhythm: Normal rate.   Pulmonary:      Effort: Pulmonary effort is normal.   Musculoskeletal:         General: Normal range of motion.      Cervical back: Normal range of motion.      Comments: Left foot: Great toenail with significant thickening, white in appearance, and mild curling of edges bilaterally.  No surrounding erythema, no tenderness to palpation, discharge.    Skin:     General: Skin is warm and dry.   Neurological:      General: No focal deficit present.      Mental Status: She is alert.   Psychiatric:         Mood and Affect: Mood normal.         Behavior: Behavior normal.

## 2021-08-30 NOTE — PATIENT INSTRUCTIONS
Your exam shows a fungal infection of your left great toenail which is likely causing the deformity of the nail itself.  For treatment you been prescribed terbinafine.  As discussed, if your symptoms are worsening, or do not improve over the 3 months of treatment, we would recommend an evaluation by podiatry for possible complete toenail removal.  Please reach out with questions or concerns.  If you would like to proceed with complete removal, please send a [email or web address] message and I am happy to place a referral.      Patient Education     Nail Fungal Infection  A nail fungal infection changes the way fingernails and toenails look. They may thicken, discolor, change shape, or split. This condition is hard to treat because nails grow slowly and have limited blood supply. The infection often comes back after treatment.   There are 2 types of medicines used to treat this condition:     Antifungal medicines for the skin (topical).  These are applied to the skin and nail area. These medicines don't work well because they can t get deep into the nail.    Oral antifungal medicines. These medicines work better because they go into the nail from the inside out. But the infection may still come back. It may take 9 to 12 months for your nail to look normal again. This means you are cured. You can repeat treatment if needed. Most people take these medicines without any problems. It's rare to stop therapy because of side effects. But your healthcare provider may give you some monitoring tests. Talk about possible side effects with your provider before starting treatment.  If medicines fail, the nail can be removed surgically or chemically. These methods physically remove the fungus from the body. This helps medical treatment be more effective.   If the changes in your nails are not bothering you, you may not need treatment. Discuss this with your healthcare provider.   Home care    Use medicines exactly as directed for as long as  directed. Treating a fungal infection can take longer than other kinds of infections.    Smoking is a risk factor for fungal infection. This is one more reason to quit.    Wear absorbent socks, and shoes that let your feet breathe. Sweaty feet increase your risk of fungal infection. They also make an existing infection harder to treat.    Use footwear when in damp public places like swimming pools, gyms, and shower rooms. This will help you stay away from the fungus that grows there.    Don't share nail clippers or scissors with others.    Follow-up care  Follow up with your healthcare provider, or as advised.  When to seek medical advice  Call your healthcare provider right away if any of these occur:    Skin by the nail becomes red, swollen, painful, or drains pus (a creamy yellow or white liquid)    Side effects from oral anti-fungal medicines  Hector last reviewed this educational content on 7/1/2019 2000-2021 [employer]. All rights reserved. This information is not intended as a substitute for professional medical care. Always follow your healthcare professional's instructions.

## 2021-10-09 ENCOUNTER — HEALTH MAINTENANCE LETTER (OUTPATIENT)
Age: 36
End: 2021-10-09

## 2021-10-19 ENCOUNTER — OFFICE VISIT (OUTPATIENT)
Dept: URGENT CARE | Facility: URGENT CARE | Age: 36
End: 2021-10-19
Payer: COMMERCIAL

## 2021-10-19 VITALS
SYSTOLIC BLOOD PRESSURE: 149 MMHG | OXYGEN SATURATION: 98 % | TEMPERATURE: 97.5 F | HEART RATE: 71 BPM | DIASTOLIC BLOOD PRESSURE: 105 MMHG

## 2021-10-19 DIAGNOSIS — L03.119 CELLULITIS AND ABSCESS OF LEG: Primary | ICD-10-CM

## 2021-10-19 DIAGNOSIS — L02.419 CELLULITIS AND ABSCESS OF LEG: Primary | ICD-10-CM

## 2021-10-19 PROCEDURE — 99213 OFFICE O/P EST LOW 20 MIN: CPT

## 2021-10-19 RX ORDER — CEPHALEXIN 500 MG/1
500 CAPSULE ORAL 3 TIMES DAILY
Qty: 30 CAPSULE | Refills: 0 | Status: SHIPPED | OUTPATIENT
Start: 2021-10-19 | End: 2022-02-25

## 2021-10-20 NOTE — PROGRESS NOTES
SUBJECTIVE:  Arpita Zafar is a 36 year old female who presents to the clinic today for a rash. Red tender area on lt lower leg two days ago.  No known injury or bite/sting.  Was slightly bigger earlier today.    Associated symptoms include: nothing.    Past Medical History:   Diagnosis Date     Abnormal Pap smear of cervix 10/31/2018    See problem list     Cervical high risk HPV (human papillomavirus) test positive 8/16/2017, 10/31/18    See problem list     Current Outpatient Medications   Medication Sig Dispense Refill     acetaminophen (TYLENOL) 325 MG tablet Take 2 tablets (650 mg) by mouth every 4 hours as needed for other (mild pain) 100 tablet 0     cephALEXin (KEFLEX) 500 MG capsule Take 1 capsule (500 mg) by mouth 3 times daily 30 capsule 0     labetalol (NORMODYNE) 200 MG tablet TAKE 1 TABLET BY MOUTH EVERY 12 HOURS       terbinafine (LAMISIL) 250 MG tablet Take 1 tablet (250 mg) by mouth daily 90 tablet 0     NIFEdipine ER (ADALAT CC) 60 MG 24 hr tablet Take 1 tablet (60 mg) by mouth daily (Patient not taking: Reported on 8/2/2021) 30 tablet 3     History   Smoking Status     Former Smoker     Types: Cigarettes     Quit date: 10/31/2015   Smokeless Tobacco     Never Used       ROS:  Review of systems negative except as stated above.    EXAM:   BP (!) 149/105 (BP Location: Right arm, Patient Position: Sitting, Cuff Size: Adult Regular)   Pulse 71   Temp 97.5  F (36.4  C) (Oral)   SpO2 98%   Breastfeeding No   GENERAL: alert, no acute distress.  SKIN: Rash description:    Slightly raised red area approx 1.5 cm diameter lt lower leg.  No streaking.    ASSESSMENT:  Possible insect bite or cellulitis    PLAN:  Warm moist packs and observation.  Printed rx for cephalexin to be filled if increasing redness and tenderness.  Follow up with primary care provider if no improvement.

## 2021-11-24 ENCOUNTER — TELEPHONE (OUTPATIENT)
Dept: FAMILY MEDICINE | Facility: CLINIC | Age: 36
End: 2021-11-24
Payer: COMMERCIAL

## 2021-11-24 NOTE — TELEPHONE ENCOUNTER
Prior Authorization Retail Medication Request    Medication/Dose: terbinafine (LAMISIL) 250 MG tablet  ICD code (if different than what is on RX):  B35.1/L60.8  Previously Tried and Failed: na  Rationale:  na    Insurance Name: [employer]   Insurance ID:  [id number]      Pharmacy Information (if different than what is on RX)  Name:  [facility]  Phone:   [phone]

## 2021-11-24 NOTE — TELEPHONE ENCOUNTER
Central Prior Authorization Team   Phone: [phone]    PA Initiation    Medication: terbinafine (LAMISIL) 250 MG tablet  Insurance Company: [email or web address] - Phone [phone] Fax [phone]  Pharmacy Filling the Rx: [id number] - [place], [place] - [address] AT [facility] [address]  Filling Pharmacy Phone: [phone]  Filling Pharmacy Fax:    Start Date: 11/24/2021

## 2021-12-07 NOTE — TELEPHONE ENCOUNTER
PRIOR AUTHORIZATION DENIED    Medication: terbinafine (LAMISIL) 250 MG tablet    Denial Date: 12/6/2021    Denial Rational:  I called insurance and was told PA was denied. Waiting on letter from insurance.     Appeal Information:

## 2021-12-28 PROCEDURE — 88305 TISSUE EXAM BY PATHOLOGIST: CPT | Mod: TC,ORL | Performed by: OBSTETRICS & GYNECOLOGY

## 2021-12-29 ENCOUNTER — LAB REQUISITION (OUTPATIENT)
Dept: LAB | Facility: CLINIC | Age: 36
End: 2021-12-29
Payer: COMMERCIAL

## 2021-12-30 LAB
PATH REPORT.COMMENTS IMP SPEC: NORMAL
PATH REPORT.COMMENTS IMP SPEC: NORMAL
PATH REPORT.FINAL DX SPEC: NORMAL
PATH REPORT.GROSS SPEC: NORMAL
PATH REPORT.MICROSCOPIC SPEC OTHER STN: NORMAL
PATH REPORT.RELEVANT HX SPEC: NORMAL
PHOTO IMAGE: NORMAL

## 2021-12-30 PROCEDURE — 88305 TISSUE EXAM BY PATHOLOGIST: CPT | Mod: 26 | Performed by: PATHOLOGY

## 2022-02-25 ENCOUNTER — VIRTUAL VISIT (OUTPATIENT)
Dept: FAMILY MEDICINE | Facility: CLINIC | Age: 37
End: 2022-02-25
Payer: COMMERCIAL

## 2022-02-25 DIAGNOSIS — I10 HYPERTENSION, UNSPECIFIED TYPE: Primary | ICD-10-CM

## 2022-02-25 PROCEDURE — 99213 OFFICE O/P EST LOW 20 MIN: CPT | Mod: 95 | Performed by: NURSE PRACTITIONER

## 2022-02-25 NOTE — LETTER
February 25, 2022      Arpita Zafar  [address]  [place] [address]        To Whom It May Concern,     Arpita Zafar is a patient of mine.  I have been following her for blood pressure management for 2+ years now.  Typically, when she is taking her medication, her blood pressure is well-controlled.  On the day she saw you, her blood pressure was elevated as she had not been taking her medication consistently.  She also can have elevated blood pressures when in clinic.  She has never had any adverse effects from elevated blood pressure (no history of stroke, heart attack, kidney dysfunction, etc.).  If her blood pressure is elevated at her next dental visit, she is cleared on my end to continue with her dental care.      Please contact our office with any questions or concerns.    Sincerely,        SALAS Singletary CNP

## 2022-02-25 NOTE — PROGRESS NOTES
"Arpita is a 37 year old who is being evaluated via a billable video visit.        Video Start Time: 11:51 AM    Assessment & Plan     Hypertension, unspecified type  When consistent with taking medication, her blood pressure is well controlled.  Needs a note to get her dental cleaning done.  Put in letter section.         7 minutes spent on the date of the encounter doing chart review, history and exam, documentation and further activities per the note       BMI:   Estimated body mass index is 26.21 kg/m  as calculated from the following:    Height as of 8/30/21: 1.651 m (5' 5\").    Weight as of 8/30/21: 71.4 kg (157 lb 8 oz).           Return in about 3 months (around 5/25/2022) for Routine preventive.    SALAS Singletary CNP  [facility]    Jerri Palm is a 37 year old who presents for the following health issues     HPI   Went to dentist.  Blood pressure was elevated so they wouldn't do it.      She is on Labetolol.  Hasn't been taking it super consistently.  She is trying to set an alarm so she could take it more consistently better.      Overall, she is tolerating it better than Nifedipine.  Nifedipine made her swollen and flushed.             Review of Systems   Constitutional, HEENT, cardiovascular, pulmonary, gi and gu systems are negative, except as otherwise noted.      Objective           Vitals:  No vitals were obtained today due to virtual visit.    Physical Exam   GENERAL: Healthy, alert and no distress  EYES: Eyes grossly normal to inspection.  No discharge or erythema, or obvious scleral/conjunctival abnormalities.  RESP: No audible wheeze, cough, or visible cyanosis.  No visible retractions or increased work of breathing.    SKIN: Visible skin clear. No significant rash, abnormal pigmentation or lesions.  NEURO: Cranial nerves grossly intact.  Mentation and speech appropriate for age.  PSYCH: Mentation appears normal, affect normal/bright, judgement and " insight intact, normal speech and appearance well-groomed.                Video-Visit Details    Type of service:  Video Visit    Video End Time:11:56 AM    Originating Location (pt. Location): Home    Distant Location (provider location):  [facility]     Platform used for Video Visit: Marlene    Answers for HPI/ROS submitted by the patient on 2/25/2022  Do you check your blood pressure regularly outside of the clinic?: No  Are your blood pressures ever more than 140 on the top number (systolic) OR more than 90 on the bottom number (diastolic)? (For example, greater than 140/90): Yes  Are you following a low salt diet?: Yes  How many servings of fruits and vegetables do you eat daily?: 0-1  On average, how many sweetened beverages do you drink each day (Examples: soda, juice, sweet tea, etc.  Do NOT count diet or artificially sweetened beverages)?: 1  How many minutes a day do you exercise enough to make your heart beat faster?: 10 to 19  How many days a week do you exercise enough to make your heart beat faster?: 3 or less  How many days per week do you miss taking your medication?: 2  What makes it hard for you to take your medication every day?: remembering to take

## 2022-03-04 ENCOUNTER — MYC MEDICAL ADVICE (OUTPATIENT)
Dept: FAMILY MEDICINE | Facility: CLINIC | Age: 37
End: 2022-03-04
Payer: COMMERCIAL

## 2022-03-04 DIAGNOSIS — Z96.612 STATUS POST LEFT SHOULDER HEMIARTHROPLASTY: Primary | ICD-10-CM

## 2022-03-04 DIAGNOSIS — I10 HYPERTENSION, UNSPECIFIED TYPE: Primary | ICD-10-CM

## 2022-03-07 RX ORDER — LABETALOL 200 MG/1
200 TABLET, FILM COATED ORAL 2 TIMES DAILY
Qty: 60 TABLET | Refills: 1 | Status: SHIPPED | OUTPATIENT
Start: 2022-03-07 | End: 2022-03-14

## 2022-03-14 RX ORDER — LABETALOL 200 MG/1
300 TABLET, FILM COATED ORAL 2 TIMES DAILY
Qty: 45 TABLET | Refills: 3 | Status: SHIPPED | OUTPATIENT
Start: 2022-03-14 | End: 2023-10-10

## 2022-03-21 ENCOUNTER — ANCILLARY PROCEDURE (OUTPATIENT)
Dept: GENERAL RADIOLOGY | Facility: CLINIC | Age: 37
End: 2022-03-21
Attending: ORTHOPAEDIC SURGERY
Payer: COMMERCIAL

## 2022-03-21 ENCOUNTER — OFFICE VISIT (OUTPATIENT)
Dept: ORTHOPEDICS | Facility: CLINIC | Age: 37
End: 2022-03-21
Payer: COMMERCIAL

## 2022-03-21 DIAGNOSIS — Z96.612 STATUS POST LEFT SHOULDER HEMIARTHROPLASTY: ICD-10-CM

## 2022-03-21 DIAGNOSIS — Z96.612 STATUS POST LEFT SHOULDER HEMIARTHROPLASTY: Primary | ICD-10-CM

## 2022-03-21 PROCEDURE — 99213 OFFICE O/P EST LOW 20 MIN: CPT | Performed by: ORTHOPAEDIC SURGERY

## 2022-03-21 PROCEDURE — 73030 X-RAY EXAM OF SHOULDER: CPT | Mod: LT | Performed by: RADIOLOGY

## 2022-03-21 NOTE — PROGRESS NOTES
Chief Concern: Surgical Followup (1 year S/p Left humeral head resurfacing hemiarthroplasty, sx: 3/9/21 )    History of Present Illness:   Arpita Zafar is a 37 year old female who presents today for follow-up regarding her avascular necrosis. The patient is one year s/p left shoulder hemiarthroplasty for AVN. She is here today for follow up. The patient says work is fine and has not had trouble with her job at work. She reports no other pain or problems with other joints.     Physical Examination:  Adult female in no acute distress. Articulates and communicates with normal affect.  Respirations even and unlabored  Focused upper extremity exam: CMS intact into the hand. No deformity or atrophy. Shoulder AROM is forward elevation to 180, enternal rotation to 85, and internal rotation to T10.    Imaging:  Radiographs of the left shoulder - 4 views (03/21/2022)  Left shoulder XRs demonstrate the humeral component is in good position. No lucencies noted. Good glenohumeral alignment.    I have independently reviewed the above imaging studies; the results were discussed with the patient.     Assessment:   1. One year status post above procedure    Plan:   I discussed with the patient her recovery s/p the above procedure. I am very pleased with how she is doing. The patient voiced understanding of the information discussed and all questions were answered. I would like to see the patient back in one year with new xrays or prn.    Scribe Disclosure:  Arturo BRAND, am serving as a scribe to document services personally performed by Steffanie Stockton MD at this visit, based upon the provider's statements to me. All documentation has been reviewed by the aforementioned provider prior to being entered into the official medical record.     Arturo BRAND, a scribe, prepared the chart for today's encounter.

## 2022-03-21 NOTE — LETTER
3/21/2022         RE: Arpita Zafar  [address]  [place] [address]        Dear Colleague,    Thank you for referring your patient, Arpita Zafar, to the [facility]. Please see a copy of my visit note below.    Chief Concern: Surgical Followup (1 year S/p Left humeral head resurfacing hemiarthroplasty, sx: 3/9/21 )    History of Present Illness:   Arpita Zafar is a 37 year old female who presents today for follow-up regarding her avascular necrosis. The patient is one year s/p left shoulder hemiarthroplasty for AVN. She is here today for follow up. The patient says work is fine and has not had trouble with her job at work. She reports no other pain or problems with other joints.     Physical Examination:  Adult female in no acute distress. Articulates and communicates with normal affect.  Respirations even and unlabored  Focused upper extremity exam: CMS intact into the hand. No deformity or atrophy. Shoulder AROM is forward elevation to 180, enternal rotation to 85, and internal rotation to T10.    Imaging:  Radiographs of the left shoulder - 4 views (03/21/2022)  Left shoulder XRs demonstrate the humeral component is in good position. No lucencies noted. Good glenohumeral alignment.    I have independently reviewed the above imaging studies; the results were discussed with the patient.     Assessment:   1. One year status post above procedure    Plan:   I discussed with the patient her recovery s/p the above procedure. I am very pleased with how she is doing. The patient voiced understanding of the information discussed and all questions were answered. I would like to see the patient back in one year with new xrays or prn.    Scribe Disclosure:  I, Arturo Restrepo, am serving as a scribe to document services personally performed by Steffanie Stockton MD at this visit, based upon the provider's statements to me. All documentation has been reviewed by  the aforementioned provider prior to being entered into the official medical record.     I, Arturo Restrepo, a scribe, prepared the chart for today's encounter.         Again, thank you for allowing me to participate in the care of your patient.        Sincerely,        Steffanie Stockton MD

## 2022-03-21 NOTE — LETTER
Arpita Zafar     1985  Encounter date/time:  03/21/22       Report of Workability    Physician:  Stefafnie Stockton MD     Diagnosis:  History of left shoulder surgery     Limitations:  No lifting more than 40 pounds with left arm.     Return to work status: May work with above restrictions     Follow-Up:   Return to clinic in 1 year

## 2022-03-21 NOTE — NURSING NOTE
Reason For Visit:   Chief Complaint   Patient presents with     Surgical Followup     1 year S/p Left humeral head resurfacing hemiarthroplasty, sx: 3/9/21        PCP: Micah Bridges    Date of surgery: 3/9/21  Type of surgery: Left shoulder hemiarthroplasty.  Smoker: No  Request smoking cessation information: No     Right hand dominant     SANE score  Affected shoulder: Left  Right shoulder SANE: 100  Left shoulder SANE: 95    There were no vitals taken for this visit.    Ifeoma Best, ATC

## 2022-05-21 ENCOUNTER — HEALTH MAINTENANCE LETTER (OUTPATIENT)
Age: 37
End: 2022-05-21

## 2022-07-21 ENCOUNTER — TELEPHONE (OUTPATIENT)
Dept: CARDIOLOGY | Facility: CLINIC | Age: 37
End: 2022-07-21

## 2022-07-21 NOTE — TELEPHONE ENCOUNTER
Confirmed and scheduled appointment with patient for 7/28 @ 3:00PM with Dr. Apodaca. Patient and I discussed appointment details, arrival time and location

## 2022-07-28 ENCOUNTER — OFFICE VISIT (OUTPATIENT)
Dept: CARDIOLOGY | Facility: CLINIC | Age: 37
End: 2022-07-28
Payer: COMMERCIAL

## 2022-07-28 ENCOUNTER — HOSPITAL ENCOUNTER (OUTPATIENT)
Dept: CARDIOLOGY | Facility: CLINIC | Age: 37
Discharge: HOME | End: 2022-07-28
Payer: COMMERCIAL

## 2022-07-28 DIAGNOSIS — O26.92 PREGNANCY RELATED CONDITION IN SECOND TRIMESTER: Primary | ICD-10-CM

## 2022-07-28 DIAGNOSIS — O09.529 AMA (ADVANCED MATERNAL AGE) MULTIGRAVIDA 35+: ICD-10-CM

## 2022-07-28 DIAGNOSIS — Z98.890 H/O LEEP: ICD-10-CM

## 2022-07-28 PROCEDURE — 93325 DOPPLER ECHO COLOR FLOW MAPG: CPT

## 2022-07-28 PROCEDURE — 93325 DOPPLER ECHO COLOR FLOW MAPG: CPT | Mod: 26 | Performed by: PEDIATRICS

## 2022-07-28 PROCEDURE — 76827 ECHO EXAM OF FETAL HEART: CPT | Mod: 26 | Performed by: PEDIATRICS

## 2022-07-28 PROCEDURE — 76825 ECHO EXAM OF FETAL HEART: CPT | Mod: 26 | Performed by: PEDIATRICS

## 2022-07-28 PROCEDURE — 99204 OFFICE O/P NEW MOD 45 MIN: CPT | Mod: 25 | Performed by: PEDIATRICS

## 2022-07-28 NOTE — PROGRESS NOTES
Fetal Cardiology Consultation    Patient:  Arpita Zafar MRN:  [id number]   YOB: 1985 Age:  37 year old   Date of Visit:  2022 PCP:  Micah Bridges   TERRY: 22 EGA: 22+0 weeks     Dear Doctor:    I had the pleasure of seeing Arpita Zafar at the [facility] in [place] on 2022 in consultation for fetal echocardiography results. She presented today accompanied by her mother. As you know, she is a 37 year old female with suspected fetal cardiac anomaly on obstetrical ultrasound (VSD); in addition there is a hypoplastic/absent fetal nasal bone with normal/negative maternal cell-free DNA testing.    The fetal echocardiogram was abnormal: Small midseptal/inferior muscular ventricular septal defect. Otherwise normal cardiac anatomy. Normal right and left ventricular size and systolic function. No pericardial effusion.     I reviewed and interpreted the fetal echocardiogram today. I discussed the anatomy, physiology, expected fetal course, and need for post-afua confirmation. The fetal cardiac anatomy is not expected to be dependent on the ductus arteriosus after birth. The expected post-afua intervention will depend on confirmation of these findings, and is likely to include observation.     -- No additional fetal echocardiograms are recommended for this pregnancy.  -- A post- transthoracic echocardiogram is recommended to confirm anatomy.    Thank you for allowing me to participate in Ms. Zafar's care. Please don't hesitate to contact me or the Fetal Cardiology team at [facility] with any questions or concerns.     I spent a total of 35 minutes on the date of the encounter doing chart review, patient history, documentation, counseling, and coordinating care.    Neto Apodaca MD  Pediatric Cardiology  [facility]  Phone [phone]    Review  of prior external note(s) from - Outside records from [facility]  Review of the result(s) of each unique test - fetal echocardiogram

## 2022-08-29 ENCOUNTER — OFFICE VISIT (OUTPATIENT)
Dept: URGENT CARE | Facility: URGENT CARE | Age: 37
End: 2022-08-29
Payer: COMMERCIAL

## 2022-08-29 VITALS
SYSTOLIC BLOOD PRESSURE: 110 MMHG | OXYGEN SATURATION: 98 % | TEMPERATURE: 98.2 F | DIASTOLIC BLOOD PRESSURE: 82 MMHG | BODY MASS INDEX: 29.49 KG/M2 | HEART RATE: 90 BPM | WEIGHT: 177.2 LBS

## 2022-08-29 DIAGNOSIS — Z33.1 PREGNANCY, INCIDENTAL: ICD-10-CM

## 2022-08-29 DIAGNOSIS — R21 RASH AND NONSPECIFIC SKIN ERUPTION: Primary | ICD-10-CM

## 2022-08-29 PROCEDURE — 99213 OFFICE O/P EST LOW 20 MIN: CPT | Performed by: FAMILY MEDICINE

## 2022-08-29 NOTE — PATIENT INSTRUCTIONS
Stop the Neosporin.  Bacitracin, only as discussed.  Follow-up if worsening condition or not improving over the next week.

## 2022-08-29 NOTE — PROGRESS NOTES
"Assessment & Plan     Arpita was seen today for urgent care and hand problem.    Diagnoses and all orders for this visit:    Rash and nonspecific skin eruption, likely contact dermatitis (due to Neosporin) and resolving hematoma/abrasion.  Doubt tenia or cellulitis.  Doubt erythema migrans, but differential was again considered.    Pregnancy, incidental    Discussed risks and benefits of treatment strategies.    Patient Instructions     Stop the Neosporin.    Bacitracin, only as discussed.    Follow-up if worsening condition or not improving over the next week.    Return for Follow up, as noted in Patient Instructions.    Char Goodman MD  [facility] [place]      Jerri Palm is a 37 year old female, who presents to clinic today for the following health issues:  Chief Complaint   Patient presents with     Urgent Care     Hand Problem     Possible derm  infection-  right hand 3rd digit between 2nd & 3rd knuckle- started as a small bump: itched erupted- discolored, painful, blisters. 26 weeks      HPI - Infection    The patient is a right-handed female, who presents with a concern regarding a possible infection.  The patient noticed a flesh-colored bump involving her right third finger a few days ago.  No mosquito, insect, or tick bites reported.  The patient scratched at the lesion until it came off, due to pruritus involving the area.  Patient applied Neosporin after accidentally hitting the same area against a door, with small \"blood blisters\" and bruising noted since that time.  Patient notes soreness if she presses on the area, but she denies significant pain.  No current pruritus reported.  No fever, chills, nausea, vomiting, bleeding, or drainage.    Treatments Tried: Neosporin.    The patient is incidentally pregnant.  She is at 26 weeks 4 days gestation, with LMP ~2/24/2022.    Review of Systems        Objective    /82   Pulse 90   Temp 98.2  F (36.8  C) " (Oral)   Wt 80.4 kg (177 lb 3.2 oz)   SpO2 98%   BMI 29.49 kg/m    GENERAL: healthy, alert and no distress  EYES: Eyes grossly normal to inspection.  Conjunctivae and sclerae normal.  NECK: spontaneous full range of motion   RESP: lungs clear to auscultation - no rales, rhonchi or wheezes  CV: regular rate and rhythm, normal S1 S2, no S3 or S4, no murmur, click or rub  RIGHT HAND/SKIN: Full range of motion, with intact strength and sensation.  There 1 x 2.5 cm rash involving the dorsum of the proximal third finger.  Rash is purple in color and pear-shaped, with a 1 mm, erythematous border noted.  There is a 0.5 cm, superficial abrasion noted involving the proximal aspect of the rash.  There is 0.4 cm blister involving the distal rash, which is raised ~0.3 cm.  The remainder of the finger is not erythematous, edematous, or ecchymotic.  Capillary refill less than 3 seconds.

## 2022-09-16 ENCOUNTER — TRANSFERRED RECORDS (OUTPATIENT)
Dept: HEALTH INFORMATION MANAGEMENT | Facility: CLINIC | Age: 37
End: 2022-09-16

## 2022-09-17 ENCOUNTER — MEDICAL CORRESPONDENCE (OUTPATIENT)
Dept: HEALTH INFORMATION MANAGEMENT | Facility: CLINIC | Age: 37
End: 2022-09-17

## 2022-09-17 ENCOUNTER — HEALTH MAINTENANCE LETTER (OUTPATIENT)
Age: 37
End: 2022-09-17

## 2022-09-19 ENCOUNTER — TRANSCRIBE ORDERS (OUTPATIENT)
Dept: MATERNAL FETAL MEDICINE | Facility: CLINIC | Age: 37
End: 2022-09-19

## 2022-09-19 DIAGNOSIS — O26.90 PREGNANCY RELATED CONDITION: Primary | ICD-10-CM

## 2022-10-31 NOTE — TELEPHONE ENCOUNTER
OV notes faxed to [facility].    Kyle Lemos RN   
Waiting for Dr. Stockton to sign 4/22 OV notes. Will then fax to [facility] ([phone])    Kyle Lemos RN   
normal...

## 2022-12-05 ENCOUNTER — VIRTUAL VISIT (OUTPATIENT)
Dept: FAMILY MEDICINE | Facility: CLINIC | Age: 37
End: 2022-12-05
Payer: COMMERCIAL

## 2022-12-05 DIAGNOSIS — J06.9 VIRAL URI WITH COUGH: Primary | ICD-10-CM

## 2022-12-05 PROCEDURE — 99213 OFFICE O/P EST LOW 20 MIN: CPT | Mod: 95 | Performed by: NURSE PRACTITIONER

## 2022-12-05 NOTE — PROGRESS NOTES
"Arpita is a 37 year old who is being evaluated via a billable telephone visit.      What phone number would you like to be contacted at? [phone]  How would you like to obtain your AVS? MyChart    Assessment & Plan     Viral URI with cough  Symptoms c/w viral URI. Discussed symptomatic measures. Follow up if not improving.       Patient Instructions   This is most likely a viral self-limiting infection that should clear spontaneously in the next 3-7 days.  Symptomatic cares recommended:  -nasal saline rinses/sprays 1-2 times daily. Obtain nasal saline spray (Ayr or Ocean are brand names).  Get into a hot shower, and wait for the sensation of your sinuses \"opening\". Occlude one side of your nose, and use a gentle spray of saline into the opposite side of your nose.  Then blow your nose to try to mobilize the nasal secretions.  -adequate rest  -copious hydration  -ibuprofen or acetaminophen for comfort  -Robitussin or Mucinex as needed for cough, nasal congestion  -throat lozenges as needed for sore throat  -Can try Zicam nasal spray     Follow-up with primary care provider if not improving in 7-10 days, sooner if worsening.     If you develop high fevers that do not go down with ibuprofen/Tylenol, shortness of breath, cough up any blood, chest pain, or any other new, concerning symptoms, please go to the ER for further evaluation.        Return in about 1 week (around 12/12/2022) for worsening or continued symptoms.    SALAS Richards CNP  [facility]    Subjective   Arpita is a 37 year old, presenting for the following health issues:  No chief complaint on file.      URI    History of Present Illness       Reason for visit:  Sore throat cough stuffy nose body aches  Symptom onset:  3-7 days ago  Symptom intensity:  Severe  Symptom progression:  Worsening  Had these symptoms before:  No  What makes it better:  Sleep    She eats 0-1 servings of fruits and vegetables daily.She " consumes 1 sweetened beverage(s) daily.She exercises with enough effort to increase her heart rate 9 or less minutes per day.  She exercises with enough effort to increase her heart rate 3 or less days per week.   She is taking medications regularly.       Acute Illness  Acute illness concerns: sore throat, cough, congestion, body aches  Onset/Duration: 1 week  Symptoms:  Fever: No  Chills/Sweats: YES  Headache (location?): YES  Sinus Pressure: No  Conjunctivitis:  No  Ear Pain: no  Rhinorrhea: No  Congestion: YES  Sore Throat: YES  Cough: YES-productive of green sputum  Wheeze: YES  Decreased Appetite: No  Nausea: No  Vomiting: No  Diarrhea: No  Dysuria/Freq.: No  Dysuria or Hematuria: No  Fatigue/Achiness: YES  Sick/Strep Exposure: YES- fiance was ill  Therapies tried and outcome: none, is breastfeeding, taking cough drops    Above HPI reviewed. Additionally, symptoms for several days last week, started to feel improved, woke up this morning with sore throat. Continued cough. No fevers, but has had some sweats/chills. No chest pain or shortness of breath. Recent COVID.       Review of Systems   Constitutional, HEENT, cardiovascular, pulmonary, gi and gu systems are negative, except as otherwise noted.      Objective           Vitals:  No vitals were obtained today due to virtual visit.    Physical Exam   healthy, alert and no distress  PSYCH: Alert and oriented times 3; coherent speech, normal   rate and volume, able to articulate logical thoughts, able   to abstract reason, no tangential thoughts, no hallucinations   or delusions  Her affect is normal  RESP: No cough, no audible wheezing, able to talk in full sentences  Remainder of exam unable to be completed due to telephone visits                Phone call duration: 5 minutes

## 2022-12-05 NOTE — PATIENT INSTRUCTIONS
"This is most likely a viral self-limiting infection that should clear spontaneously in the next 3-7 days.  Symptomatic cares recommended:  -nasal saline rinses/sprays 1-2 times daily. Obtain nasal saline spray (Ayr or Ocean are brand names).  Get into a hot shower, and wait for the sensation of your sinuses \"opening\". Occlude one side of your nose, and use a gentle spray of saline into the opposite side of your nose.  Then blow your nose to try to mobilize the nasal secretions.  -adequate rest  -copious hydration  -ibuprofen or acetaminophen for comfort  -Robitussin or Mucinex as needed for cough, nasal congestion  -throat lozenges as needed for sore throat  -Can try Zicam nasal spray     Follow-up with primary care provider if not improving in 7-10 days, sooner if worsening.     If you develop high fevers that do not go down with ibuprofen/Tylenol, shortness of breath, cough up any blood, chest pain, or any other new, concerning symptoms, please go to the ER for further evaluation.    "

## 2023-01-25 ENCOUNTER — OFFICE VISIT (OUTPATIENT)
Dept: DERMATOLOGY | Facility: CLINIC | Age: 38
End: 2023-01-25
Payer: COMMERCIAL

## 2023-01-25 DIAGNOSIS — D18.01 CHERRY ANGIOMA: Primary | ICD-10-CM

## 2023-01-25 DIAGNOSIS — L81.4 SOLAR LENTIGINOSIS: ICD-10-CM

## 2023-01-25 DIAGNOSIS — D22.9 MULTIPLE BENIGN NEVI: ICD-10-CM

## 2023-01-25 DIAGNOSIS — D48.5 NEOPLASM OF UNCERTAIN BEHAVIOR OF SKIN: ICD-10-CM

## 2023-01-25 PROCEDURE — 99203 OFFICE O/P NEW LOW 30 MIN: CPT | Mod: 25 | Performed by: PHYSICIAN ASSISTANT

## 2023-01-25 PROCEDURE — 11102 TANGNTL BX SKIN SINGLE LES: CPT | Performed by: PHYSICIAN ASSISTANT

## 2023-01-25 PROCEDURE — 88305 TISSUE EXAM BY PATHOLOGIST: CPT | Performed by: DERMATOLOGY

## 2023-01-25 ASSESSMENT — PAIN SCALES - GENERAL: PAINLEVEL: NO PAIN (0)

## 2023-01-25 NOTE — PROGRESS NOTES
[facility] Dermatology Note  Encounter Date: Jan 25, 2023  Office Visit     Dermatology Problem List:  1. NUB - L lower back - s/p bx 1/25/22    Social: 2mo old baby girl.  ____________________________________________    Assessment & Plan:    # Neoplasm of uncertain behavior (D49.2) on the L lower back. The differential diagnosis includes DN vs MM vs other.  - shave - see below    # Multiple clinically benign nevi on the trunk and extremities. No treatment is necessary at this time unless the lesion changes or becomes symptomatic.    - ABCDEs of melanoma were discussed and self skin checks were advised.    # Cherry Angiomas - trunk and extremities. Explained to patient benign nature of lesion. No treatment is necessary at this time unless the lesion changes or becomes symptomatic.      # Solar lentigines on the sun exposed skin areas. Benign nature was discussed. No further intervention required at this time.    - Sun precaution was advised including the use of sun screens of SPF 30 or higher, sun protective clothing, and avoidance of tanning beds.        Procedures Performed:   - Shave biopsy procedure note, location(s): L lower back. After discussion of benefits and risks including but not limited to bleeding, infection, scar, incomplete removal, recurrence, and non-diagnostic biopsy, written consent and photographs were obtained. The area was cleaned with isopropyl alcohol. 0.5mL of 1% lidocaine with epinephrine was injected to obtain adequate anesthesia of lesion(s). Shave biopsy at site(s) performed. Hemostasis was achieved with aluminium chloride. Petrolatum ointment and a sterile dressing were applied. The patient tolerated the procedure and no complications were noted. The patient was provided with verbal and written post care instructions.     Follow-up: 2 year(s) in-person, or earlier for new or changing lesions    Staff and Scribe:     Scribe Disclosure:   Ricci BRAND am  serving as a scribe to document services personally performed by this physician, Ignacia Jean Baptiste PA-C, based on data collection and the provider's statements to me.   Provider Disclosure:   The documentation recorded by the scribe accurately reflects the services I personally performed and the decisions made by me.    All risks, benefits and alternatives were discussed with patient.  Patient is in agreement and understands the assessment and plan.  All questions were answered.    Ignacia Jean Baptiste PA-C, [facility]  [facility] [place]: Phone: [phone], Fax: [phone]  [facility]: Phone: [phone],  Fax: [phone]  [facility]: Phone: [phone], Fax: [phone]  ____________________________________________    CC: No chief complaint on file.    HPI:  Ms. Arpita Zafar is a(n) 37 year old female who presents today as a new patient for FBSE.    Self referred.     Today, reports a mole on her upper back which has been present as long as she can remember but would like to have it checked. No personal of fhx melanoma. 2mo post partum with 1st baby - girl    Patient is otherwise feeling well, without additional skin concerns.    Labs Reviewed:  N/A    Physical Exam:  Vitals: There were no vitals taken for this visit.  SKIN: Full skin, which includes the head/face, both arms, chest, back, abdomen,both legs, genitalia and/or groin buttocks, digits and/or nails, was examined.  - Bettencourt type III.  - L lower back - 3,, asymmetrical brown macule  - There are dome shaped bright red papules on the trunk and extremities.   - Multiple regular brown pigmented macules and papules are identified on the trunk and extremities, >100.  - Scattered brown macules on sun exposed areas.  - No other lesions of concern on areas examined.           Medications:  Current Outpatient Medications   Medication     labetalol  (NORMODYNE) 200 MG tablet     No current facility-administered medications for this visit.      Past Medical History:   Patient Active Problem List   Diagnosis     Cervical high risk HPV (human papillomavirus) test positive     Atypical squamous cells cannot exclude high grade squamous intraepithelial lesion on cytologic smear of cervix (ASC-H)     Avascular necrosis of bone of shoulder (H)     Hypertension, unspecified type     Past Medical History:   Diagnosis Date     Abnormal Pap smear of cervix 10/31/2018    See problem list     Cervical high risk HPV (human papillomavirus) test positive 8/16/2017, 10/31/18    See problem list

## 2023-01-25 NOTE — NURSING NOTE
Arpita Zafar's chief complaint for this visit includes:  Chief Complaint   Patient presents with     Skin Check     FBSC: area of concern upper back. No history of skin cancer in self or family.      PCP: [facility]    Referring Provider:  Referred Self, MD  No address on file    There were no vitals taken for this visit.  No Pain (0)        Allergies   Allergen Reactions     Nifedipine Rash         Do you need any medication refills at today's visit?  No.     Becky Feldman LPN

## 2023-01-25 NOTE — LETTER
1/25/2023         RE: Arpita Zafar  [address]  [place] [address]        Dear Colleague,    Thank you for referring your patient, Arpita Zafar, to the [facility]. Please see a copy of my visit note below.    [facility] Dermatology Note  Encounter Date: Jan 25, 2023  Office Visit     Dermatology Problem List:  1. NUB - L lower back - s/p bx 1/25/22    Social: 2mo old baby girl.  ____________________________________________    Assessment & Plan:    # Neoplasm of uncertain behavior (D49.2) on the L lower back. The differential diagnosis includes DN vs MM vs other.  - shave - see below    # Multiple clinically benign nevi on the trunk and extremities. No treatment is necessary at this time unless the lesion changes or becomes symptomatic.    - ABCDEs of melanoma were discussed and self skin checks were advised.    # Cherry Angiomas - trunk and extremities. Explained to patient benign nature of lesion. No treatment is necessary at this time unless the lesion changes or becomes symptomatic.      # Solar lentigines on the sun exposed skin areas. Benign nature was discussed. No further intervention required at this time.    - Sun precaution was advised including the use of sun screens of SPF 30 or higher, sun protective clothing, and avoidance of tanning beds.        Procedures Performed:   - Shave biopsy procedure note, location(s): L lower back. After discussion of benefits and risks including but not limited to bleeding, infection, scar, incomplete removal, recurrence, and non-diagnostic biopsy, written consent and photographs were obtained. The area was cleaned with isopropyl alcohol. 0.5mL of 1% lidocaine with epinephrine was injected to obtain adequate anesthesia of lesion(s). Shave biopsy at site(s) performed. Hemostasis was achieved with aluminium chloride. Petrolatum ointment and a sterile dressing were applied. The patient tolerated the  procedure and no complications were noted. The patient was provided with verbal and written post care instructions.     Follow-up: 2 year(s) in-person, or earlier for new or changing lesions    Staff and Scribe:     Scribe Disclosure:   I, Ricci Solorzano, am serving as a scribe to document services personally performed by this physician, Ignacia Jean Baptiste PA-C, based on data collection and the provider's statements to me.   Provider Disclosure:   The documentation recorded by the scribe accurately reflects the services I personally performed and the decisions made by me.    All risks, benefits and alternatives were discussed with patient.  Patient is in agreement and understands the assessment and plan.  All questions were answered.    Ignacia Jean Baptiste PA-C, [facility]  [facility] [place]: Phone: [phone], Fax: [phone]  [facility]: Phone: [phone],  Fax: [phone]  [facility]: Phone: [phone], Fax: [phone]  ____________________________________________    CC: No chief complaint on file.    HPI:  Ms. Arpita Zafar is a(n) 37 year old female who presents today as a new patient for FBSE.    Self referred.     Today, reports a mole on her upper back which has been present as long as she can remember but would like to have it checked. No personal of fhx melanoma. 2mo post partum with 1st baby - girl    Patient is otherwise feeling well, without additional skin concerns.    Labs Reviewed:  N/A    Physical Exam:  Vitals: There were no vitals taken for this visit.  SKIN: Full skin, which includes the head/face, both arms, chest, back, abdomen,both legs, genitalia and/or groin buttocks, digits and/or nails, was examined.  - Bettencourt type III.  - L lower back - 3,, asymmetrical brown macule  - There are dome shaped bright red papules on the trunk and extremities.   - Multiple regular brown pigmented  macules and papules are identified on the trunk and extremities, >100.  - Scattered brown macules on sun exposed areas.  - No other lesions of concern on areas examined.           Medications:  Current Outpatient Medications   Medication     labetalol (NORMODYNE) 200 MG tablet     No current facility-administered medications for this visit.      Past Medical History:   Patient Active Problem List   Diagnosis     Cervical high risk HPV (human papillomavirus) test positive     Atypical squamous cells cannot exclude high grade squamous intraepithelial lesion on cytologic smear of cervix (ASC-H)     Avascular necrosis of bone of shoulder (H)     Hypertension, unspecified type     Past Medical History:   Diagnosis Date     Abnormal Pap smear of cervix 10/31/2018    See problem list     Cervical high risk HPV (human papillomavirus) test positive 8/16/2017, 10/31/18    See problem list          The following medication was given:     MEDICATION:  Lidocaine 1%   ROUTE: SQ  SITE: see procedure note  DOSE: 0.5 mL  LOT #: [id number]  : Hospira  EXPIRATION DATE: 09/01/2023  NDC#: 1597-7169-71  Was there drug waste? 0.5 mL  Multi-dose vial: Yes    Becky Feldman LPN  January 25, 2023          Again, thank you for allowing me to participate in the care of your patient.        Sincerely,        Ignacia Jean Baptiste PA-C

## 2023-01-25 NOTE — PROGRESS NOTES
The following medication was given:     MEDICATION:  Lidocaine 1%   ROUTE: SQ  SITE: see procedure note  DOSE: 0.5 mL  LOT #: [id number]  : Hospira  EXPIRATION DATE: 09/01/2023  NDC#: 1193-9744-27  Was there drug waste? 0.5 mL  Multi-dose vial: Yes    Becky Feldman LPN  January 25, 2023

## 2023-01-29 LAB
PATH REPORT.COMMENTS IMP SPEC: NORMAL
PATH REPORT.COMMENTS IMP SPEC: NORMAL
PATH REPORT.FINAL DX SPEC: NORMAL
PATH REPORT.GROSS SPEC: NORMAL
PATH REPORT.MICROSCOPIC SPEC OTHER STN: NORMAL
PATH REPORT.RELEVANT HX SPEC: NORMAL

## 2023-03-06 ENCOUNTER — OFFICE VISIT (OUTPATIENT)
Dept: ORTHOPEDICS | Facility: CLINIC | Age: 38
End: 2023-03-06
Payer: COMMERCIAL

## 2023-03-06 ENCOUNTER — TELEPHONE (OUTPATIENT)
Dept: ORTHOPEDICS | Facility: CLINIC | Age: 38
End: 2023-03-06

## 2023-03-06 ENCOUNTER — ANCILLARY PROCEDURE (OUTPATIENT)
Dept: GENERAL RADIOLOGY | Facility: CLINIC | Age: 38
End: 2023-03-06
Attending: ORTHOPAEDIC SURGERY
Payer: COMMERCIAL

## 2023-03-06 DIAGNOSIS — M25.511 BILATERAL SHOULDER PAIN: ICD-10-CM

## 2023-03-06 DIAGNOSIS — M25.512 BILATERAL SHOULDER PAIN: ICD-10-CM

## 2023-03-06 DIAGNOSIS — M87.00 AVN (AVASCULAR NECROSIS OF BONE) (H): Primary | ICD-10-CM

## 2023-03-06 PROCEDURE — 73030 X-RAY EXAM OF SHOULDER: CPT | Mod: RT | Performed by: RADIOLOGY

## 2023-03-06 PROCEDURE — 99214 OFFICE O/P EST MOD 30 MIN: CPT | Performed by: ORTHOPAEDIC SURGERY

## 2023-03-06 NOTE — NURSING NOTE
Reason For Visit:   Chief Complaint   Patient presents with     Surgical Followup     Right shoulder pain; S/p Left humeral head resurfacing hemiarthroplasty, sx: 3/9/21 left shoulder is feeling great          PCP: Micah Bridges     Date of surgery: 3/9/21  Type of surgery: Left shoulder hemiarthroplasty.  Smoker: No  Request smoking cessation information: No     Right hand dominant     SANE score  Affected shoulder: Left  Right shoulder SANE:30-40 intermittently   Left shoulder SANE: 90    There were no vitals taken for this visit.    Ifeoma Best, ATC

## 2023-03-06 NOTE — TELEPHONE ENCOUNTER
Last surgery was done RAP, I don't think they are doing that yet, so we just need to ask the Pt if she is ok with doing a same-day discharge to home instead of to hotel. If yes, then we could scheduled at [facility] [place] [place] (jane's are approved for [facility] [place] [place] right?). If she is not comfortable going home, then we need to schedule at [facility].    Procedure: Right shoulder resurfacing arthroplasty (jane)  Facility:  or [facility] [place] [place]  Length: 120 minutes  Anesthesia: Choice, Interscalene Block  Post-op appointments needed: 2 weeks provider only, 6 weeks with provider only.  Surgery packet/instructions given to patient?  Need to mail    Kyle Lemos RN

## 2023-03-06 NOTE — LETTER
3/6/2023         RE: Arpita Zafar  [address]  [place] [address]        Dear Colleague,    Thank you for referring your patient, Arpita Zafar, to the [facility]. Please see a copy of my visit note below.    Chief Concern: Status post left shoulder hemiarthroplasty for AVN & Worsening right shoulder pain  DATE OF SURGERY: 3/9/21    History of Present Illness:   Arpita Zafar is a 38 year old female who presents today for follow-up regarding her avascular necrosis. The patient is two years s/p left shoulder hemiarthroplasty for AVN.  Unfortunately, approximately 6 months ago, while patient was repositioning herself in bed, she developed shooting pain of her right shoulder.  In the intervening time, she has continued to have intermittent episodes of sharp shooting pain impacting her right shoulder.  These episodes of pain are not associated with particular movements, but rather occur randomly.  Due to these episodes of shooting pain, patient has been limiting her activity in hopes of preventing recurrence.  She states that these episodes of sharp shooting pain last for a brief period of time, and then dissipate spontaneously.  She is not currently taking anything related to pain.  She denies associated neurovascular changes.  She denies significant change in her range of motion.  Patient reports that her current right shoulder symptoms are very similar to the presenting symptoms of her left shoulder prior to her hemiarthroplasty.    With regards to her left shoulder hemiarthroplasty, patient reports that her left shoulder has been doing exceptionally well.  She has been able to perform all of her necessary activities without difficulty or limitation.  She has no concerns with regards to her left shoulder hemiarthroplasty.    Patient presents to clinic today predominantly to determine the etiology associated with her right shoulder pain.    Physical  Examination:  General: In no acute distress, sitting comfortably in chair  Chest: breathing on room air comfortably  Musculoskeletal Exam:   RUE: skin intact, no obvious physical deformities, no rashes.   No shoulder asymmetry compared with contralateral shoulder. No muscle atrophy.   No pain on palpation over SC joint, AC joint, or long head of biceps.   SILT in axillary, median, ulnar musculocutaneous, and radial nerve distributions  Firing EPL, FPL, intrinsics, wrist flexion/extension, bicep, tricep, deltoid.     Right shoulder motion:  Active motion is FE to 170 degrees without discomfort, Abduction to 150 degrees without discomfort, ER at side to 80 degrees without discomfort, IR to T12 without discomfort at end range.    Left shoulder motion:  Active motion is FE to 170 degrees without discomfort, Abduction to 165 degrees without discomfort, ER at side to 75 degrees without discomfort, IR to T12 without discomfort at end range.    SPECIAL TESTS:  Rotator Cuff:  -5/5 strength throughout rotator cuff musculature  -Diffusely negative provocative tests    Imaging:  XR bilateral shoulders (3/6/2023)  Demonstrates stable appearance of the left shoulder humeral component without associated concern for loosening.   Evidence of AVN of the right shoulder with associated subchondral collapse    Assessment:   1. Two years status post above procedure  2. Right shoulder AVN    Plan:   Discussed with patient that based upon XR, and physical exam that her left shoulder hemiarthroplasty appears to be in stable alignment, and functioning well.    Unfortunately, XR reveals AVN of her right humeral head.  Discussed that management of AVN is predominantly based upon patient's symptomatology therefore, emphasized that patient is able to determine when and how aggressive she would like to be.  Ultimately, patient is indicated for a right shoulder hemiarthroplasty given her AVN with associated subchondral collapse.  Patient will work  with her family to determine an appropriate surgical time.  Patient is well aware of the risk, and benefits associated with surgical intervention, and is interested in proceeding in an expedient fashion.  She currently has a 3.5-month-old daughter at home, and is hoping to undergo surgical intervention while her daughter is less mobile.    Patient will arrange timing for surgical intervention with her family.    Patient acknowledged understanding of the above care plan; no additional questions or concerns.    This patient was discussed and evaluated with Dr. Stockton who is in agreement with the above care plan.    Lilliam Pretty MD  Orthopaedic Surgery, PGY-5       I have personally examined this patient and have reviewed the clinical presentation and progress note with the resident.  I agree with the treatment plan as outlined.  The plan was formulated with the resident on the day of the resident's note.         Again, thank you for allowing me to participate in the care of your patient.        Sincerely,        Steffanie Stockton MD

## 2023-03-06 NOTE — PROGRESS NOTES
Chief Concern: Status post left shoulder hemiarthroplasty for AVN & Worsening right shoulder pain  DATE OF SURGERY: 3/9/21    History of Present Illness:   Arpita Zafar is a 38 year old female who presents today for follow-up regarding her avascular necrosis. The patient is two years s/p left shoulder hemiarthroplasty for AVN.  Unfortunately, approximately 6 months ago, while patient was repositioning herself in bed, she developed shooting pain of her right shoulder.  In the intervening time, she has continued to have intermittent episodes of sharp shooting pain impacting her right shoulder.  These episodes of pain are not associated with particular movements, but rather occur randomly.  Due to these episodes of shooting pain, patient has been limiting her activity in hopes of preventing recurrence.  She states that these episodes of sharp shooting pain last for a brief period of time, and then dissipate spontaneously.  She is not currently taking anything related to pain.  She denies associated neurovascular changes.  She denies significant change in her range of motion.  Patient reports that her current right shoulder symptoms are very similar to the presenting symptoms of her left shoulder prior to her hemiarthroplasty.    With regards to her left shoulder hemiarthroplasty, patient reports that her left shoulder has been doing exceptionally well.  She has been able to perform all of her necessary activities without difficulty or limitation.  She has no concerns with regards to her left shoulder hemiarthroplasty.    Patient presents to clinic today predominantly to determine the etiology associated with her right shoulder pain.    Physical Examination:  General: In no acute distress, sitting comfortably in chair  Chest: breathing on room air comfortably  Musculoskeletal Exam:   RUE: skin intact, no obvious physical deformities, no rashes.   No shoulder asymmetry compared with contralateral shoulder. No muscle  atrophy.   No pain on palpation over SC joint, AC joint, or long head of biceps.   SILT in axillary, median, ulnar musculocutaneous, and radial nerve distributions  Firing EPL, FPL, intrinsics, wrist flexion/extension, bicep, tricep, deltoid.     Right shoulder motion:  Active motion is FE to 170 degrees without discomfort, Abduction to 150 degrees without discomfort, ER at side to 80 degrees without discomfort, IR to T12 without discomfort at end range.    Left shoulder motion:  Active motion is FE to 170 degrees without discomfort, Abduction to 165 degrees without discomfort, ER at side to 75 degrees without discomfort, IR to T12 without discomfort at end range.    SPECIAL TESTS:  Rotator Cuff:  -5/5 strength throughout rotator cuff musculature  -Diffusely negative provocative tests    Imaging:  XR bilateral shoulders (3/6/2023)  Demonstrates stable appearance of the left shoulder humeral component without associated concern for loosening.   Evidence of AVN of the right shoulder with associated subchondral collapse    Assessment:   1. Two years status post above procedure  2. Right shoulder AVN    Plan:   Discussed with patient that based upon XR, and physical exam that her left shoulder hemiarthroplasty appears to be in stable alignment, and functioning well.    Unfortunately, XR reveals AVN of her right humeral head.  Discussed that management of AVN is predominantly based upon patient's symptomatology therefore, emphasized that patient is able to determine when and how aggressive she would like to be.  Ultimately, patient is indicated for a right shoulder hemiarthroplasty given her AVN with associated subchondral collapse.  Patient will work with her family to determine an appropriate surgical time.  Patient is well aware of the risk, and benefits associated with surgical intervention, and is interested in proceeding in an expedient fashion.  She currently has a 3.5-month-old daughter at home, and is hoping to  undergo surgical intervention while her daughter is less mobile.    Patient will arrange timing for surgical intervention with her family.    Patient acknowledged understanding of the above care plan; no additional questions or concerns.    This patient was discussed and evaluated with Dr. Stockton who is in agreement with the above care plan.    Lilliam Pretty MD  Orthopaedic Surgery, PGY-5       I have personally examined this patient and have reviewed the clinical presentation and progress note with the resident.  I agree with the treatment plan as outlined.  The plan was formulated with the resident on the day of the resident's note.

## 2023-03-07 NOTE — TELEPHONE ENCOUNTER
Message left for the patient to call back to get surgery scheduled.  Josephine Gray  Surgery Scheduling Coordinator  Ph: [phone]

## 2023-03-13 NOTE — TELEPHONE ENCOUNTER
Message left for the patient to call back to get surgery scheduled with Dr. Stockton.  Josephine Gray  Surgery Scheduling Coordinator  Ph: [phone]

## 2023-03-26 ENCOUNTER — TELEPHONE (OUTPATIENT)
Dept: NURSING | Facility: CLINIC | Age: 38
End: 2023-03-26

## 2023-03-26 ENCOUNTER — OFFICE VISIT (OUTPATIENT)
Dept: URGENT CARE | Facility: URGENT CARE | Age: 38
End: 2023-03-26
Payer: COMMERCIAL

## 2023-03-26 ENCOUNTER — NURSE TRIAGE (OUTPATIENT)
Dept: NURSING | Facility: CLINIC | Age: 38
End: 2023-03-26

## 2023-03-26 VITALS
OXYGEN SATURATION: 100 % | DIASTOLIC BLOOD PRESSURE: 92 MMHG | TEMPERATURE: 97.6 F | BODY MASS INDEX: 27.96 KG/M2 | SYSTOLIC BLOOD PRESSURE: 132 MMHG | HEART RATE: 69 BPM | WEIGHT: 168 LBS

## 2023-03-26 DIAGNOSIS — M87.019 AVASCULAR NECROSIS OF BONE OF SHOULDER (H): ICD-10-CM

## 2023-03-26 PROCEDURE — 99213 OFFICE O/P EST LOW 20 MIN: CPT | Performed by: STUDENT IN AN ORGANIZED HEALTH CARE EDUCATION/TRAINING PROGRAM

## 2023-03-26 RX ORDER — IBUPROFEN 200 MG
200 TABLET ORAL EVERY 4 HOURS PRN
COMMUNITY

## 2023-03-26 RX ORDER — OXYCODONE HYDROCHLORIDE 5 MG/1
5-10 TABLET ORAL EVERY 4 HOURS PRN
Qty: 20 TABLET | Refills: 0 | Status: SHIPPED | OUTPATIENT
Start: 2023-03-26 | End: 2023-03-26

## 2023-03-26 RX ORDER — OXYCODONE HYDROCHLORIDE 5 MG/1
5-10 TABLET ORAL EVERY 4 HOURS PRN
Qty: 20 TABLET | Refills: 0 | Status: ON HOLD | OUTPATIENT
Start: 2023-03-26 | End: 2023-05-16

## 2023-03-26 RX ORDER — OXYCODONE HYDROCHLORIDE 5 MG/1
5-10 TABLET ORAL EVERY 4 HOURS PRN
Qty: 20 TABLET | Refills: 0 | Status: CANCELLED
Start: 2023-03-26

## 2023-03-26 ASSESSMENT — PAIN SCALES - GENERAL: PAINLEVEL: MODERATE PAIN (4)

## 2023-03-26 NOTE — TELEPHONE ENCOUNTER
Pt calling back for RX pain medication.    Pt is crying during this call.    Will send msg to provider and call clinic, if needed.    Do Love RN, Nurse Advisor 12:32 PM 3/26/2023

## 2023-03-26 NOTE — TELEPHONE ENCOUNTER
Please disregard.  Addendum made to earlier call of today.    Do Love RN, Nurse Advisor 11:25 AM 3/26/2023

## 2023-03-26 NOTE — TELEPHONE ENCOUNTER
Pt calling about Oxycodone Rx, was seen in [facility] today. States pharmacy is closed, wants Rx sent to [facility] in [facility] on [address].    Advised call back if have worsen symptoms, questions/concerns.    Goyo Kimble, RN, BSN  3/26/2023 at 10:46 AM  [facility]        Reason for Disposition    [1] Prescription not at pharmacy AND [2] was prescribed by PCP recently (Exception: triager has access to EMR and prescription is recorded there. Go to Home Care and confirm for pharmacy.)     Closed pharmacy    Protocols used: MEDICATION QUESTION CALL-A-

## 2023-03-26 NOTE — TELEPHONE ENCOUNTER
Pt calling back asking for pain medication to be sent to pharmacy below as soon as possible.    Pt is tearful during this call stating pain is so bad.    Will route to ordering provider.    Do Love RN, Nurse Advisor 11:24 AM 3/26/2023

## 2023-03-26 NOTE — TELEPHONE ENCOUNTER
Please call patient to notify that prescription was resent to [facility] on [address].  Josephine Castillo, CNP

## 2023-03-26 NOTE — PROGRESS NOTES
"Assessment & Plan     Avascular necrosis of bone of shoulder (H)  Patient has avascular necrosis of the right shoulder causing intermittent flare-ups. She had the same condition of the left shoulder and had surgery to treat it. She plans to call the surgeon on Monday to schedule right shoulder replacement to treat. The pain is severe when it flares, causing the shoulder to become \"stuck\" with the sensation of bone on bone in the shoulder. When her left shoulder was affected she needed opioid pain medication as Tylenol and ibuprofen do not reduce the pain. PDMP checked and no prescription for controlled substances in the past year. I gave her prescription for oxycodone to use as needed for severe pain. She will continue to take ibuprofen and Tylenol scheduled to help control the pain and will call Dr. Stockton's office on Monday to schedule surgery.   - oxyCODONE (ROXICODONE) 5 MG tablet  Dispense: 20 tablet; Refill: 0     Addendum: pharmacy closed that prescription was sent to during visit. Patient requested it is sent to [facility] on [address] so I resent it there. PMDP checked and the medication has not been filled yet.    No follow-ups on file.    SALAS Perez [facility] CARE CONSTANTINE Palm is a 38 year old female who presents to clinic today for the following health issues:  Chief Complaint   Patient presents with     Shoulder Pain     Right shoulder flare up of ongoing condition avascular necrosis       HPI    Patient has avascular necrosis of the right shoulder causing intermittent flare-ups. She had the same condition of the left shoulder and had surgery to treat it. She plans to call the surgeon on Monday to schedule right shoulder replacement to treat. The pain is severe when it flares, causing the shoulder to become \"stuck\" with the sensation of bone on bone in the shoulder. When her left shoulder was affected she needed opioid pain medication as Tylenol " and ibuprofen do not reduce the pain.       Review of Systems  Constitutional, HEENT, cardiovascular, pulmonary, gi and gu systems are negative, except as otherwise noted.      Objective    BP (!) 132/92   Pulse 69   Temp 97.6  F (36.4  C) (Temporal)   Wt 76.2 kg (168 lb)   SpO2 100%   BMI 27.96 kg/m    Physical Exam   GENERAL APPEARANCE: alert and moderate distress, tearful  MS: right shoulder ROM limited due to pain, is holding right arm with left hand in a sling position   SKIN: no suspicious lesions or rashes  NEURO: Normal strength and tone, mentation intact and speech normal

## 2023-03-27 NOTE — TELEPHONE ENCOUNTER
Date Scheduled: 5-16-23  Facility: Surgery Locations: [facility]  Surgeon: Dr. Stockton   Post-op appointment scheduled:    scheduled?: No  Surgery packet/instructions confirmed received?  No-please mail  Pre op physical/PAC appointment:   Special Considerations:     Patient added to move up list to get in sooner if possible.     Josephine Gray  Surgery Scheduling Coordinator  [phone]

## 2023-03-31 NOTE — TELEPHONE ENCOUNTER
Spoke with the patient to offer her a sooner surgery date. She needs to check with her boss before moving up the surgery. She will call me back to let me know her decision.     Pending move up date is 5-2-23    Josephine Grya  Surgery Scheduling Coordinator  Ph: [phone]

## 2023-04-12 ENCOUNTER — DOCUMENTATION ONLY (OUTPATIENT)
Dept: ORTHOPEDICS | Facility: CLINIC | Age: 38
End: 2023-04-12
Payer: COMMERCIAL

## 2023-04-12 NOTE — PROGRESS NOTES
Forms Received: [facility]  Company: Absence [employer]  Status: Signed and faxed to absence management    Carmina Rodriguez on 5/2/2023 at 12:57 PM

## 2023-05-02 ENCOUNTER — OFFICE VISIT (OUTPATIENT)
Dept: FAMILY MEDICINE | Facility: CLINIC | Age: 38
End: 2023-05-02
Payer: COMMERCIAL

## 2023-05-02 VITALS
SYSTOLIC BLOOD PRESSURE: 110 MMHG | RESPIRATION RATE: 24 BRPM | HEART RATE: 77 BPM | BODY MASS INDEX: 27.06 KG/M2 | HEIGHT: 65 IN | TEMPERATURE: 97.8 F | OXYGEN SATURATION: 98 % | WEIGHT: 162.4 LBS | DIASTOLIC BLOOD PRESSURE: 80 MMHG

## 2023-05-02 DIAGNOSIS — I10 HYPERTENSION, UNSPECIFIED TYPE: ICD-10-CM

## 2023-05-02 DIAGNOSIS — Z01.818 PREOP GENERAL PHYSICAL EXAM: Primary | ICD-10-CM

## 2023-05-02 DIAGNOSIS — M87.00 AVASCULAR NECROSIS OF BONE (H): ICD-10-CM

## 2023-05-02 LAB
ANION GAP SERPL CALCULATED.3IONS-SCNC: 6 MMOL/L (ref 3–14)
BUN SERPL-MCNC: 9 MG/DL (ref 7–30)
CALCIUM SERPL-MCNC: 10.2 MG/DL (ref 8.5–10.1)
CHLORIDE BLD-SCNC: 106 MMOL/L (ref 94–109)
CO2 SERPL-SCNC: 26 MMOL/L (ref 20–32)
CREAT SERPL-MCNC: 0.75 MG/DL (ref 0.52–1.04)
ERYTHROCYTE [DISTWIDTH] IN BLOOD BY AUTOMATED COUNT: 12.8 % (ref 10–15)
GFR SERPL CREATININE-BSD FRML MDRD: >90 ML/MIN/1.73M2
GLUCOSE BLD-MCNC: 108 MG/DL (ref 70–99)
HCT VFR BLD AUTO: 41.6 % (ref 35–47)
HGB BLD-MCNC: 14.3 G/DL (ref 11.7–15.7)
MCH RBC QN AUTO: 32.5 PG (ref 26.5–33)
MCHC RBC AUTO-ENTMCNC: 34.4 G/DL (ref 31.5–36.5)
MCV RBC AUTO: 95 FL (ref 78–100)
PLATELET # BLD AUTO: 282 10E3/UL (ref 150–450)
POTASSIUM BLD-SCNC: 3.9 MMOL/L (ref 3.4–5.3)
RBC # BLD AUTO: 4.4 10E6/UL (ref 3.8–5.2)
SODIUM SERPL-SCNC: 138 MMOL/L (ref 133–144)
WBC # BLD AUTO: 3.7 10E3/UL (ref 4–11)

## 2023-05-02 PROCEDURE — 85027 COMPLETE CBC AUTOMATED: CPT | Performed by: PREVENTIVE MEDICINE

## 2023-05-02 PROCEDURE — 80048 BASIC METABOLIC PNL TOTAL CA: CPT | Performed by: PREVENTIVE MEDICINE

## 2023-05-02 PROCEDURE — 99214 OFFICE O/P EST MOD 30 MIN: CPT | Performed by: PREVENTIVE MEDICINE

## 2023-05-02 PROCEDURE — 36415 COLL VENOUS BLD VENIPUNCTURE: CPT | Performed by: PREVENTIVE MEDICINE

## 2023-05-02 NOTE — PROGRESS NOTES
[facility]  [address] [facility] [id number]  Phone: [phone]  Primary Provider: [facility] - [facility]  Pre-op Performing Provider: AN BURNHAM      PREOPERATIVE EVALUATION:  Today's date: 5/2/2023    Arpita Zafar is a 38 year old female who presents for a preoperative evaluation.      5/2/2023     8:43 AM   Additional Questions   Roomed by Mery     Surgical Information:  Surgery/Procedure: Resurfacing Arthroplasty Rt. Shoulder  Surgery Location: [facility]  Surgeon: Dr. Stockton  Surgery Date: 5/16/23  Time of Surgery: 6:30am  Where patient plans to recover: At home with family  Fax number for surgical facility: Note does not need to be faxed, will be available electronically in Epic.    Assessment & Plan     The proposed surgical procedure is considered INTERMEDIATE risk.    Preop general physical exam  -procedure scheduled for 5/16/23  - Basic metabolic panel  (Ca, Cl, CO2, Creat, Gluc, K, Na, BUN)  - CBC with platelets    Avascular necrosis of bone (H)  -Impacting right shoulder     Hypertension, unspecified type  -at goal  -continue current medication   - Basic metabolic panel  (Ca, Cl, CO2, Creat, Gluc, K, Na, BUN)  - CBC with platelets              - No identified additional risk factors other than previously addressed    Antiplatelet or Anticoagulation Medication Instructions:   - Patient is on no antiplatelet or anticoagulation medications.    Additional Medication Instructions:  Patient is to take all scheduled medications on the day of surgery   - Beta Blockers: Continue taking on the day of surgery.    RECOMMENDATION:  APPROVAL GIVEN to proceed with proposed procedure, without further diagnostic evaluation.    Ordering of each unique test  20 minutes spent by me on the date of the encounter doing chart review, history and exam, documentation and further activities per the note      Subjective      HPI related to upcoming  procedure: 38 year old female with history of avascular necrosis of the shoulder.       Anesthesia: Choice         5/2/2023     8:37 AM   Preop Questions   1. Have you ever had a heart attack or stroke? No   2. Have you ever had surgery on your heart or blood vessels, such as a stent placement, a coronary artery bypass, or surgery on an artery in your head, neck, heart, or legs? No   3. Do you have chest pain with activity? No   4. Do you have a history of  heart failure? No   5. Do you currently have a cold, bronchitis or symptoms of other infection? No   6. Do you have a cough, shortness of breath, or wheezing? No   7. Do you or anyone in your family have previous history of blood clots? No   8. Do you or does anyone in your family have a serious bleeding problem such as prolonged bleeding following surgeries or cuts? No   9. Have you ever had problems with anemia or been told to take iron pills? No   10. Have you had any abnormal blood loss such as black, tarry or bloody stools, or abnormal vaginal bleeding? No   11. Have you ever had a blood transfusion? No   12. Are you willing to have a blood transfusion if it is medically needed before, during, or after your surgery? Yes   13. Have you or any of your relatives ever had problems with anesthesia? No   14. Do you have sleep apnea, excessive snoring or daytime drowsiness? No   15. Do you have any artifical heart valves or other implanted medical devices like a pacemaker, defibrillator, or continuous glucose monitor? No   16. Do you have artificial joints? YES - Left shoulder Arthroplasty    17. Are you allergic to latex? No   18. Is there any chance that you may be pregnant? No     Physical activity+  Had a baby 5 months ago  Half an hour of exercise  No chest pain  No dizziness  No syncope      Health Care Directive:  Patient does not have a Health Care Directive or Living Will: Discussed advance care planning with patient; information given to patient to  review.    Preoperative Review of :   reviewed - controlled substances reflected in medication list.      Status of Chronic Conditions:  HYPERTENSION - Patient has a history of HTN , currently denies any symptoms referable to elevated blood pressure. Specifically denies chest pain, palpitations, dyspnea, orthopnea, PND or peripheral edema. Blood pressure readings have been in normal range. Current medication regimen is as listed below. Patient denies any side effects of medication.       Review of Systems  CONSTITUTIONAL: NEGATIVE for fever, chills, change in weight  INTEGUMENTARY/SKIN: NEGATIVE for worrisome rashes, moles or lesions  EYES: NEGATIVE for vision changes or irritation  ENT/MOUTH: NEGATIVE for ear, mouth and throat problems  RESP: NEGATIVE for significant cough or SOB  CV: NEGATIVE for chest pain, palpitations or peripheral edema  GI: NEGATIVE for nausea, abdominal pain, heartburn, or change in bowel habits  : NEGATIVE for frequency, dysuria, or hematuria  NEURO: NEGATIVE for weakness, dizziness or paresthesias  ENDOCRINE: NEGATIVE for temperature intolerance, skin/hair changes  HEME: NEGATIVE for bleeding problems  PSYCHIATRIC: NEGATIVE for changes in mood or affect    Patient Active Problem List    Diagnosis Date Noted     Hypertension, unspecified type 01/22/2021     Priority: Medium     Avascular necrosis of bone of shoulder (H) 10/06/2020     Priority: Medium     Atypical squamous cells cannot exclude high grade squamous intraepithelial lesion on cytologic smear of cervix (ASC-H) 11/06/2018     Priority: Medium     8/16/17 NIL pap, + HR HPV (not 16 or 18). Plan: cotest in 1 year.  10/31/18 ASC-H pap, + HR HPV (not 16 or 18). Plan colp.  11/15/18 [place]: ESTELA 2, Pap: ASC-H. Plan: LEEP  1/15/19 LEEP bx: ESTELA 2 with negative margins. Plan: review at f/u visit.   2/27/19 F/U visit: Plan: cotest at 12 and 24 mo.   3/5/20 NIL pap, neg HPV. Plan: cotest in 1 year  2/22/21 Reminder MyChart  3/23/21  Reminder call - spoke to pt.   21 Lost to follow up        Cervical high risk HPV (human papillomavirus) test positive 2017     Priority: Medium      Past Medical History:   Diagnosis Date     Abnormal Pap smear of cervix 10/31/2018    See problem list     Cervical high risk HPV (human papillomavirus) test positive 2017, 10/31/18    See problem list     Past Surgical History:   Procedure Laterality Date     ARTHROPLASTY SHOULDER Left 3/9/2021    Procedure: Left Shoulder Resurfacing Hemiarthroplasty;  Surgeon: Steffanie Stockton MD;  Location: [facility] OR     CONIZATION LEEP N/A 1/15/2019    Procedure: LOOP ELECTROSURGICAL EXCISION PROCEDURE WITH CONIZATION OF CERVIX;  Surgeon: Corey Blakely MD;  Location: [facility] OR     HC COLP CERVIX/UPPER VAGINA W ENDOCERV CURETT        BREAST BIOPSY CORE NEEDLE LEFT       Current Outpatient Medications   Medication Sig Dispense Refill     labetalol (NORMODYNE) 200 MG tablet Take 1.5 tablets (300 mg) by mouth 2 times daily 45 tablet 3     Prenatal Multivit-Min-Fe-FA (PRENATAL 1 + IRON OR) Take 1 tablet by mouth daily       Folic Acid-Cholecalciferol 1-500 MG-UNIT TABS  (Patient not taking: Reported on 2023)       ibuprofen (ADVIL/MOTRIN) 200 MG tablet Take 200 mg by mouth every 4 hours as needed for pain (Patient not taking: Reported on 2023)       oxyCODONE (ROXICODONE) 5 MG tablet Take 1-2 tablets (5-10 mg) by mouth every 4 hours as needed for pain (Moderate to Severe) (Patient not taking: Reported on 2023) 20 tablet 0       Allergies   Allergen Reactions     Nifedipine Rash        Social History     Tobacco Use     Smoking status: Former     Types: Cigarettes     Quit date: 10/31/2015     Years since quittin.5     Smokeless tobacco: Never   Vaping Use     Vaping status: Never Used   Substance Use Topics     Alcohol use: Yes     Comment: occasionally wine     Family History   Problem Relation Age of Onset     Diabetes Father       "Hyperlipidemia Father      Malignant Hyperthermia No family hx of      Anesthesia Reaction No family hx of      Deep Vein Thrombosis (DVT) No family hx of      History   Drug Use No         Objective     /80 (BP Location: Left arm, Patient Position: Sitting, Cuff Size: Adult Regular)   Pulse 77   Temp 97.8  F (36.6  C) (Oral)   Resp 24   Ht 1.651 m (5' 5\")   Wt 73.7 kg (162 lb 6.4 oz)   LMP 04/29/2023 (Exact Date)   SpO2 98%   BMI 27.02 kg/m      Physical Exam  GENERAL APPEARANCE: healthy, alert and no distress  EYES: Eyes grossly normal to inspection and conjunctivae and sclerae normal  HENT: nose and mouth without ulcers or lesions  NECK: no adenopathy and trachea midline and normal to palpation, I did not hear any carotid bruits   RESP: lungs clear to auscultation - no rales, rhonchi or wheezes  CV: regular rates and rhythm, normal S1 S2, no S3 or S4 and no murmur, click or rub  ABDOMEN: soft, non-tender and no rebound or guarding   MS: extremities normal- no gross deformities noted and peripheral pulses normal  SKIN: no suspicious lesions or rashes  NEURO: Normal strength and tone, mentation intact and speech normal  PSYCH: mentation appears normal      No results for input(s): HGB, PLT, INR, NA, POTASSIUM, CR, A1C in the last 13925 hours.     Diagnostics:  Labs pending at this time.  Results will be reviewed when available.   No EKG required, no history of coronary heart disease, significant arrhythmia, peripheral arterial disease or other structural heart disease.    Revised Cardiac Risk Index (RCRI):  The patient has the following serious cardiovascular risks for perioperative complications:   - No serious cardiac risks = 0 points     RCRI Interpretation: 0 points: Class I (very low risk - 0.4% complication rate)           Signed Electronically by: Carmen Beach MD MPH    Copy of this evaluation report is provided to requesting physician.      "

## 2023-05-02 NOTE — RESULT ENCOUNTER NOTE
Arpita,     Basic blood count is not showing anemia or infection.  Other labs are pending.     Please do not hesitate to call us at [phone] if you have any questions or concerns.    Thank you,    Carmen Beach MD MPH

## 2023-05-02 NOTE — RESULT ENCOUNTER NOTE
Arpita,     Electrolytes, non fasting glucose and kidney function are normal.  Calcium was marginally elevated, avoid any supplements with calcium.     Please do not hesitate to call us at [phone] if you have any questions or concerns.    Thank you,    Carmen Beach MD MPH

## 2023-05-02 NOTE — PATIENT INSTRUCTIONS
For informational purposes only. Not to replace the advice of your health care provider. Copyright   2003,  [place] [employer] [facility]. All rights reserved. Clinically reviewed by Jodie Orlando MD. Divided [id number] - REV .  Preparing for Your Surgery  Getting started  A nurse will call you to review your health history and instructions. They will give you an arrival time based on your scheduled surgery time. Please be ready to share:  Your doctor's clinic name and phone number  Your medical, surgical, and anesthesia history  A list of allergies and sensitivities  A list of medicines, including herbal treatments and over-the-counter drugs  Whether the patient has a legal guardian (ask how to send us the papers in advance)  Please tell us if you're pregnant--or if there's any chance you might be pregnant. Some surgeries may injure a fetus (unborn baby), so they require a pregnancy test. Surgeries that are safe for a fetus don't always need a test, and you can choose whether to have one.   If you have a child who's having surgery, please ask for a copy of Preparing for Your Child's Surgery.    Preparing for surgery  Within 10 to 30 days of surgery: Have a pre-op exam (sometimes called an H&P, or History and Physical). This can be done at a clinic or pre-operative center.  If you're having a , you may not need this exam. Talk to your care team.  At your pre-op exam, talk to your care team about all medicines you take. If you need to stop any medicines before surgery, ask when to start taking them again.  We do this for your safety. Many medicines can make you bleed too much during surgery. Some change how well surgery (anesthesia) drugs work.  Call your insurance company to let them know you're having surgery. (If you don't have insurance, call [phone].)  Call your clinic if there's any change in your health. This includes signs of a cold or flu (sore throat, runny nose, cough, rash, fever). It  also includes a scrape or scratch near the surgery site.  If you have questions on the day of surgery, call your hospital or surgery center.  Eating and drinking guidelines  For your safety: Unless your surgeon tells you otherwise, follow the guidelines below.  Eat and drink as usual until 8 hours before you arrive for surgery. After that, no food or milk.  Drink clear liquids until 2 hours before you arrive. These are liquids you can see through, like water, Gatorade, and Propel Water. They also include plain black coffee and tea (no cream or milk), candy, and breath mints. You can spit out gum when you arrive.  If you drink alcohol: Stop drinking it the night before surgery.  If your care team tells you to take medicine on the morning of surgery, it's okay to take it with a sip of water.  Preventing infection  Shower or bathe the night before and morning of your surgery. Follow the instructions your clinic gave you. (If no instructions, use regular soap.)  Don't shave or clip hair near your surgery site. We'll remove the hair if needed.  Don't smoke or vape the morning of surgery. You may chew nicotine gum up to 2 hours before surgery. A nicotine patch is okay.  Note: Some surgeries require you to completely quit smoking and nicotine. Check with your surgeon.  Your care team will make every effort to keep you safe from infection. We will:  Clean our hands often with soap and water (or an alcohol-based hand rub).  Clean the skin at your surgery site with a special soap that kills germs.  Give you a special gown to keep you warm. (Cold raises the risk of infection.)  Wear special hair covers, masks, gowns and gloves during surgery.  Give antibiotic medicine, if prescribed. Not all surgeries need antibiotics.  What to bring on the day of surgery  Photo ID and insurance card  Copy of your health care directive, if you have one  Glasses and hearing aids (bring cases)  You can't wear contacts during surgery  Inhaler and  eye drops, if you use them (tell us about these when you arrive)  CPAP machine or breathing device, if you use them  A few personal items, if spending the night  If you have . . .  A pacemaker, ICD (cardiac defibrillator) or other implant: Bring the ID card.  An implanted stimulator: Bring the remote control.  A legal guardian: Bring a copy of the certified (court-stamped) guardianship papers.  Please remove any jewelry, including body piercings. Leave jewelry and other valuables at home.  If you're going home the day of surgery  You must have a responsible adult drive you home. They should stay with you overnight as well.  If you don't have someone to stay with you, and you aren't safe to go home alone, we may keep you overnight. Insurance often won't pay for this.  After surgery  If it's hard to control your pain or you need more pain medicine, please call your surgeon's office.  Questions?   If you have any questions for your care team, list them here: _________________________________________________________________________________________________________________________________________________________________________ ____________________________________ ____________________________________ ____________________________________    How to Take Your Medication Before Surgery  - Take all of your medications before surgery as usual

## 2023-05-16 ENCOUNTER — HOSPITAL ENCOUNTER (OUTPATIENT)
Facility: CLINIC | Age: 38
Discharge: HOME | End: 2023-05-16
Attending: ORTHOPAEDIC SURGERY | Admitting: ORTHOPAEDIC SURGERY
Payer: COMMERCIAL

## 2023-05-16 ENCOUNTER — ANESTHESIA (OUTPATIENT)
Dept: SURGERY | Facility: CLINIC | Age: 38
End: 2023-05-16
Payer: COMMERCIAL

## 2023-05-16 ENCOUNTER — ANESTHESIA EVENT (OUTPATIENT)
Dept: SURGERY | Facility: CLINIC | Age: 38
End: 2023-05-16
Payer: COMMERCIAL

## 2023-05-16 VITALS
BODY MASS INDEX: 27.03 KG/M2 | WEIGHT: 162.26 LBS | DIASTOLIC BLOOD PRESSURE: 100 MMHG | OXYGEN SATURATION: 96 % | HEART RATE: 74 BPM | TEMPERATURE: 97.5 F | HEIGHT: 65 IN | RESPIRATION RATE: 16 BRPM | SYSTOLIC BLOOD PRESSURE: 148 MMHG

## 2023-05-16 DIAGNOSIS — M87.019 AVASCULAR NECROSIS OF BONE OF SHOULDER (H): Primary | ICD-10-CM

## 2023-05-16 PROCEDURE — 710N000010 HC RECOVERY PHASE 1, LEVEL 2, PER MIN: Performed by: ORTHOPAEDIC SURGERY

## 2023-05-16 PROCEDURE — 271N000001 HC OR GENERAL SUPPLY NON-STERILE: Performed by: ORTHOPAEDIC SURGERY

## 2023-05-16 PROCEDURE — 250N000011 HC RX IP 250 OP 636: Performed by: NURSE ANESTHETIST, CERTIFIED REGISTERED

## 2023-05-16 PROCEDURE — 360N000077 HC SURGERY LEVEL 4, PER MIN: Performed by: ORTHOPAEDIC SURGERY

## 2023-05-16 PROCEDURE — 710N000012 HC RECOVERY PHASE 2, PER MINUTE: Performed by: ORTHOPAEDIC SURGERY

## 2023-05-16 PROCEDURE — 272N000002 HC OR SUPPLY OTHER OPNP: Performed by: ORTHOPAEDIC SURGERY

## 2023-05-16 PROCEDURE — 250N000013 HC RX MED GY IP 250 OP 250 PS 637: Performed by: ANESTHESIOLOGY

## 2023-05-16 PROCEDURE — 250N000009 HC RX 250: Performed by: ORTHOPAEDIC SURGERY

## 2023-05-16 PROCEDURE — 258N000003 HC RX IP 258 OP 636: Performed by: NURSE ANESTHETIST, CERTIFIED REGISTERED

## 2023-05-16 PROCEDURE — 250N000013 HC RX MED GY IP 250 OP 250 PS 637: Performed by: ORTHOPAEDIC SURGERY

## 2023-05-16 PROCEDURE — 370N000017 HC ANESTHESIA TECHNICAL FEE, PER MIN: Performed by: ORTHOPAEDIC SURGERY

## 2023-05-16 PROCEDURE — 250N000011 HC RX IP 250 OP 636: Performed by: ANESTHESIOLOGY

## 2023-05-16 PROCEDURE — 250N000009 HC RX 250: Performed by: NURSE ANESTHETIST, CERTIFIED REGISTERED

## 2023-05-16 PROCEDURE — 272N000001 HC OR GENERAL SUPPLY STERILE: Performed by: ORTHOPAEDIC SURGERY

## 2023-05-16 PROCEDURE — C1713 ANCHOR/SCREW BN/BN,TIS/BN: HCPCS | Performed by: ORTHOPAEDIC SURGERY

## 2023-05-16 PROCEDURE — 250N000011 HC RX IP 250 OP 636: Performed by: ORTHOPAEDIC SURGERY

## 2023-05-16 PROCEDURE — C9290 INJ, BUPIVACAINE LIPOSOME: HCPCS | Performed by: ANESTHESIOLOGY

## 2023-05-16 PROCEDURE — 250N000025 HC SEVOFLURANE, PER MIN: Performed by: ORTHOPAEDIC SURGERY

## 2023-05-16 PROCEDURE — 23470 RECONSTRUCT SHOULDER JOINT: CPT | Mod: RT | Performed by: ORTHOPAEDIC SURGERY

## 2023-05-16 PROCEDURE — 258N000001 HC RX 258: Performed by: ORTHOPAEDIC SURGERY

## 2023-05-16 PROCEDURE — 999N000141 HC STATISTIC PRE-PROCEDURE NURSING ASSESSMENT: Performed by: ORTHOPAEDIC SURGERY

## 2023-05-16 DEVICE — BIO-COMPSI CRKSCRW FT 4.5X 14MM
Type: IMPLANTABLE DEVICE | Site: SHOULDER | Status: FUNCTIONAL
Brand: ARTHREX®

## 2023-05-16 DEVICE — BIO-COMP SWVLK C, CLD 4.75X19.1MM
Type: IMPLANTABLE DEVICE | Site: SHOULDER | Status: FUNCTIONAL
Brand: ARTHREX®

## 2023-05-16 RX ORDER — OXYCODONE HYDROCHLORIDE 5 MG/1
5 TABLET ORAL
Status: COMPLETED | OUTPATIENT
Start: 2023-05-16 | End: 2023-05-16

## 2023-05-16 RX ORDER — LIDOCAINE HYDROCHLORIDE 20 MG/ML
INJECTION, SOLUTION INFILTRATION; PERINEURAL PRN
Status: DISCONTINUED | OUTPATIENT
Start: 2023-05-16 | End: 2023-05-16

## 2023-05-16 RX ORDER — FENTANYL CITRATE 50 UG/ML
25 INJECTION, SOLUTION INTRAMUSCULAR; INTRAVENOUS EVERY 5 MIN PRN
Status: DISCONTINUED | OUTPATIENT
Start: 2023-05-16 | End: 2023-05-16 | Stop reason: HOSPADM

## 2023-05-16 RX ORDER — OXYCODONE HYDROCHLORIDE 5 MG/1
5-10 TABLET ORAL EVERY 4 HOURS PRN
Qty: 20 TABLET | Refills: 0 | Status: SHIPPED | OUTPATIENT
Start: 2023-05-16 | End: 2023-10-10

## 2023-05-16 RX ORDER — HYDROMORPHONE HYDROCHLORIDE 1 MG/ML
0.2 INJECTION, SOLUTION INTRAMUSCULAR; INTRAVENOUS; SUBCUTANEOUS EVERY 5 MIN PRN
Status: DISCONTINUED | OUTPATIENT
Start: 2023-05-16 | End: 2023-05-16 | Stop reason: HOSPADM

## 2023-05-16 RX ORDER — HYDROMORPHONE HYDROCHLORIDE 1 MG/ML
0.4 INJECTION, SOLUTION INTRAMUSCULAR; INTRAVENOUS; SUBCUTANEOUS EVERY 5 MIN PRN
Status: DISCONTINUED | OUTPATIENT
Start: 2023-05-16 | End: 2023-05-16 | Stop reason: HOSPADM

## 2023-05-16 RX ORDER — OXYCODONE HYDROCHLORIDE 5 MG/1
5 TABLET ORAL ONCE
Status: COMPLETED | OUTPATIENT
Start: 2023-05-16 | End: 2023-05-16

## 2023-05-16 RX ORDER — AMOXICILLIN 250 MG
1-2 CAPSULE ORAL 2 TIMES DAILY
Qty: 30 TABLET | Refills: 0 | Status: SHIPPED | OUTPATIENT
Start: 2023-05-16 | End: 2023-10-10

## 2023-05-16 RX ORDER — FENTANYL CITRATE 50 UG/ML
50 INJECTION, SOLUTION INTRAMUSCULAR; INTRAVENOUS EVERY 5 MIN PRN
Status: DISCONTINUED | OUTPATIENT
Start: 2023-05-16 | End: 2023-05-16 | Stop reason: HOSPADM

## 2023-05-16 RX ORDER — BUPIVACAINE HYDROCHLORIDE 2.5 MG/ML
INJECTION, SOLUTION EPIDURAL; INFILTRATION; INTRACAUDAL
Status: COMPLETED | OUTPATIENT
Start: 2023-05-16 | End: 2023-05-16

## 2023-05-16 RX ORDER — NALOXONE HYDROCHLORIDE 0.4 MG/ML
0.2 INJECTION, SOLUTION INTRAMUSCULAR; INTRAVENOUS; SUBCUTANEOUS
Status: DISCONTINUED | OUTPATIENT
Start: 2023-05-16 | End: 2023-05-16 | Stop reason: HOSPADM

## 2023-05-16 RX ORDER — SODIUM CHLORIDE, SODIUM LACTATE, POTASSIUM CHLORIDE, CALCIUM CHLORIDE 600; 310; 30; 20 MG/100ML; MG/100ML; MG/100ML; MG/100ML
INJECTION, SOLUTION INTRAVENOUS CONTINUOUS PRN
Status: DISCONTINUED | OUTPATIENT
Start: 2023-05-16 | End: 2023-05-16

## 2023-05-16 RX ORDER — IBUPROFEN 600 MG/1
600 TABLET, FILM COATED ORAL EVERY 6 HOURS PRN
Qty: 30 TABLET | Refills: 0 | Status: SHIPPED | OUTPATIENT
Start: 2023-05-16 | End: 2023-10-10

## 2023-05-16 RX ORDER — ACETAMINOPHEN 325 MG/1
650 TABLET ORAL EVERY 4 HOURS PRN
Qty: 50 TABLET | Refills: 0 | Status: SHIPPED | OUTPATIENT
Start: 2023-05-16

## 2023-05-16 RX ORDER — VANCOMYCIN HYDROCHLORIDE 1 G/20ML
INJECTION, POWDER, LYOPHILIZED, FOR SOLUTION INTRAVENOUS PRN
Status: DISCONTINUED | OUTPATIENT
Start: 2023-05-16 | End: 2023-05-16 | Stop reason: HOSPADM

## 2023-05-16 RX ORDER — NALOXONE HYDROCHLORIDE 0.4 MG/ML
0.4 INJECTION, SOLUTION INTRAMUSCULAR; INTRAVENOUS; SUBCUTANEOUS
Status: DISCONTINUED | OUTPATIENT
Start: 2023-05-16 | End: 2023-05-16 | Stop reason: HOSPADM

## 2023-05-16 RX ORDER — FLUMAZENIL 0.1 MG/ML
0.2 INJECTION, SOLUTION INTRAVENOUS
Status: DISCONTINUED | OUTPATIENT
Start: 2023-05-16 | End: 2023-05-16 | Stop reason: HOSPADM

## 2023-05-16 RX ORDER — ONDANSETRON 2 MG/ML
INJECTION INTRAMUSCULAR; INTRAVENOUS PRN
Status: DISCONTINUED | OUTPATIENT
Start: 2023-05-16 | End: 2023-05-16

## 2023-05-16 RX ORDER — ONDANSETRON 4 MG/1
4 TABLET, ORALLY DISINTEGRATING ORAL EVERY 30 MIN PRN
Status: DISCONTINUED | OUTPATIENT
Start: 2023-05-16 | End: 2023-05-16 | Stop reason: HOSPADM

## 2023-05-16 RX ORDER — CEFAZOLIN SODIUM/WATER 2 G/20 ML
2 SYRINGE (ML) INTRAVENOUS SEE ADMIN INSTRUCTIONS
Status: DISCONTINUED | OUTPATIENT
Start: 2023-05-16 | End: 2023-05-16 | Stop reason: HOSPADM

## 2023-05-16 RX ORDER — FENTANYL CITRATE 50 UG/ML
25-50 INJECTION, SOLUTION INTRAMUSCULAR; INTRAVENOUS
Status: DISCONTINUED | OUTPATIENT
Start: 2023-05-16 | End: 2023-05-16

## 2023-05-16 RX ORDER — FENTANYL CITRATE 50 UG/ML
INJECTION, SOLUTION INTRAMUSCULAR; INTRAVENOUS PRN
Status: DISCONTINUED | OUTPATIENT
Start: 2023-05-16 | End: 2023-05-16

## 2023-05-16 RX ORDER — ONDANSETRON 4 MG/1
4 TABLET, ORALLY DISINTEGRATING ORAL EVERY 8 HOURS PRN
Qty: 4 TABLET | Refills: 0 | Status: SHIPPED | OUTPATIENT
Start: 2023-05-16 | End: 2023-10-10

## 2023-05-16 RX ORDER — EPHEDRINE SULFATE 50 MG/ML
INJECTION, SOLUTION INTRAMUSCULAR; INTRAVENOUS; SUBCUTANEOUS PRN
Status: DISCONTINUED | OUTPATIENT
Start: 2023-05-16 | End: 2023-05-16

## 2023-05-16 RX ORDER — CEFAZOLIN SODIUM/WATER 2 G/20 ML
2 SYRINGE (ML) INTRAVENOUS
Status: COMPLETED | OUTPATIENT
Start: 2023-05-16 | End: 2023-05-16

## 2023-05-16 RX ORDER — SODIUM CHLORIDE, SODIUM LACTATE, POTASSIUM CHLORIDE, CALCIUM CHLORIDE 600; 310; 30; 20 MG/100ML; MG/100ML; MG/100ML; MG/100ML
INJECTION, SOLUTION INTRAVENOUS CONTINUOUS
Status: DISCONTINUED | OUTPATIENT
Start: 2023-05-16 | End: 2023-05-16 | Stop reason: HOSPADM

## 2023-05-16 RX ORDER — PROPOFOL 10 MG/ML
INJECTION, EMULSION INTRAVENOUS PRN
Status: DISCONTINUED | OUTPATIENT
Start: 2023-05-16 | End: 2023-05-16

## 2023-05-16 RX ORDER — ONDANSETRON 2 MG/ML
4 INJECTION INTRAMUSCULAR; INTRAVENOUS EVERY 30 MIN PRN
Status: DISCONTINUED | OUTPATIENT
Start: 2023-05-16 | End: 2023-05-16 | Stop reason: HOSPADM

## 2023-05-16 RX ORDER — DEXAMETHASONE SODIUM PHOSPHATE 4 MG/ML
INJECTION, SOLUTION INTRA-ARTICULAR; INTRALESIONAL; INTRAMUSCULAR; INTRAVENOUS; SOFT TISSUE PRN
Status: DISCONTINUED | OUTPATIENT
Start: 2023-05-16 | End: 2023-05-16

## 2023-05-16 RX ORDER — ACETAMINOPHEN 325 MG/1
650 TABLET ORAL
Status: DISCONTINUED | OUTPATIENT
Start: 2023-05-16 | End: 2023-05-16 | Stop reason: HOSPADM

## 2023-05-16 RX ORDER — TRANEXAMIC ACID 650 MG/1
1950 TABLET ORAL ONCE
Status: COMPLETED | OUTPATIENT
Start: 2023-05-16 | End: 2023-05-16

## 2023-05-16 RX ADMIN — TRANEXAMIC ACID 1950 MG: 650 TABLET ORAL at 09:09

## 2023-05-16 RX ADMIN — BUPIVACAINE 10 ML: 13.3 INJECTION, SUSPENSION, LIPOSOMAL INFILTRATION at 10:30

## 2023-05-16 RX ADMIN — PHENYLEPHRINE HYDROCHLORIDE 100 MCG: 10 INJECTION INTRAVENOUS at 11:34

## 2023-05-16 RX ADMIN — HYDROMORPHONE HYDROCHLORIDE 0.4 MG: 1 INJECTION, SOLUTION INTRAMUSCULAR; INTRAVENOUS; SUBCUTANEOUS at 13:48

## 2023-05-16 RX ADMIN — MINERAL OIL AND PETROLATUM 0.5 INCH: 150; 830 OINTMENT OPHTHALMIC at 10:54

## 2023-05-16 RX ADMIN — OXYCODONE HYDROCHLORIDE 5 MG: 5 TABLET ORAL at 13:52

## 2023-05-16 RX ADMIN — MIDAZOLAM 2 MG: 1 INJECTION INTRAMUSCULAR; INTRAVENOUS at 10:43

## 2023-05-16 RX ADMIN — Medication 10 MG: at 12:08

## 2023-05-16 RX ADMIN — DEXAMETHASONE SODIUM PHOSPHATE 6 MG: 4 INJECTION, SOLUTION INTRA-ARTICULAR; INTRALESIONAL; INTRAMUSCULAR; INTRAVENOUS; SOFT TISSUE at 11:05

## 2023-05-16 RX ADMIN — PHENYLEPHRINE HYDROCHLORIDE 100 MCG: 10 INJECTION INTRAVENOUS at 11:27

## 2023-05-16 RX ADMIN — MIDAZOLAM 1 MG: 1 INJECTION INTRAMUSCULAR; INTRAVENOUS at 10:25

## 2023-05-16 RX ADMIN — SODIUM CHLORIDE, POTASSIUM CHLORIDE, SODIUM LACTATE AND CALCIUM CHLORIDE: 600; 310; 30; 20 INJECTION, SOLUTION INTRAVENOUS at 12:20

## 2023-05-16 RX ADMIN — PHENYLEPHRINE HYDROCHLORIDE 100 MCG: 10 INJECTION INTRAVENOUS at 11:21

## 2023-05-16 RX ADMIN — HYDROMORPHONE HYDROCHLORIDE 0.4 MG: 1 INJECTION, SOLUTION INTRAMUSCULAR; INTRAVENOUS; SUBCUTANEOUS at 13:38

## 2023-05-16 RX ADMIN — PROPOFOL 200 MG: 10 INJECTION, EMULSION INTRAVENOUS at 10:53

## 2023-05-16 RX ADMIN — LIDOCAINE HYDROCHLORIDE 100 MG: 20 INJECTION, SOLUTION INFILTRATION; PERINEURAL at 10:53

## 2023-05-16 RX ADMIN — OXYCODONE HYDROCHLORIDE 5 MG: 5 TABLET ORAL at 13:43

## 2023-05-16 RX ADMIN — BUPIVACAINE HYDROCHLORIDE 5 ML: 2.5 INJECTION, SOLUTION EPIDURAL; INFILTRATION; INTRACAUDAL at 10:30

## 2023-05-16 RX ADMIN — ONDANSETRON 4 MG: 2 INJECTION INTRAMUSCULAR; INTRAVENOUS at 12:40

## 2023-05-16 RX ADMIN — ONDANSETRON 4 MG: 2 INJECTION INTRAMUSCULAR; INTRAVENOUS at 14:57

## 2023-05-16 RX ADMIN — FENTANYL CITRATE 50 MCG: 50 INJECTION, SOLUTION INTRAMUSCULAR; INTRAVENOUS at 13:10

## 2023-05-16 RX ADMIN — Medication 2 G: at 10:44

## 2023-05-16 RX ADMIN — ACETAMINOPHEN 650 MG: 325 TABLET ORAL at 13:43

## 2023-05-16 RX ADMIN — Medication 5 MG: at 11:36

## 2023-05-16 RX ADMIN — SODIUM CHLORIDE, POTASSIUM CHLORIDE, SODIUM LACTATE AND CALCIUM CHLORIDE: 600; 310; 30; 20 INJECTION, SOLUTION INTRAVENOUS at 10:37

## 2023-05-16 RX ADMIN — FENTANYL CITRATE 50 MCG: 50 INJECTION, SOLUTION INTRAMUSCULAR; INTRAVENOUS at 13:15

## 2023-05-16 RX ADMIN — PHENYLEPHRINE HYDROCHLORIDE 50 MCG: 10 INJECTION INTRAVENOUS at 11:13

## 2023-05-16 RX ADMIN — Medication 5 MG: at 11:43

## 2023-05-16 RX ADMIN — Medication 5 MG: at 11:55

## 2023-05-16 RX ADMIN — HYDROMORPHONE HYDROCHLORIDE 0.4 MG: 1 INJECTION, SOLUTION INTRAMUSCULAR; INTRAVENOUS; SUBCUTANEOUS at 14:09

## 2023-05-16 RX ADMIN — FENTANYL CITRATE 50 MCG: 50 INJECTION INTRAMUSCULAR; INTRAVENOUS at 10:25

## 2023-05-16 ASSESSMENT — ACTIVITIES OF DAILY LIVING (ADL)
ADLS_ACUITY_SCORE: 35

## 2023-05-16 NOTE — ANESTHESIA POSTPROCEDURE EVALUATION
Patient: Arpita Zafar    Procedure: Procedure(s):  RESURFACING ARTHROPLASTY, RIGHT SHOULDER       Anesthesia Type:  General    Note:  Disposition: Outpatient   Postop Pain Control: Uneventful            Sign Out: Well controlled pain   PONV: No   Neuro/Psych: Uneventful            Sign Out: Acceptable/Baseline neuro status   Airway/Respiratory: Uneventful            Sign Out: Acceptable/Baseline resp. status   CV/Hemodynamics: Uneventful            Sign Out: Acceptable CV status; No obvious hypovolemia; No obvious fluid overload   Other NRE: NONE   DID A NON-ROUTINE EVENT OCCUR? No           Last vitals:  Vitals Value Taken Time   /86 05/16/23 1330   Temp 36  C (96.8  F) 05/16/23 1315   Pulse 82 05/16/23 1344   Resp 17 05/16/23 1344   SpO2 98 % 05/16/23 1344   Vitals shown include unvalidated device data.    Electronically Signed By: Luke Pace MD  May 16, 2023  1:45 PM

## 2023-05-16 NOTE — BRIEF OP NOTE
[facility]    Brief Operative Note    Pre-operative diagnosis: AVN (avascular necrosis of bone) (H) [M87.00]  Post-operative diagnosis Same as pre-operative diagnosis    Procedure: Procedure(s):  RESURFACING ARTHROPLASTY, RIGHT SHOULDER  Surgeon: Surgeon(s) and Role:     * Steffanie Stockton MD - Primary     * Kassidy Lewis MD - Assisting  Anesthesia: General with Block   Estimated Blood Loss: Less than 50 ml    Drains: None  Specimens: * No specimens in log *  Findings:   large area of bone collapse in central humeral head.  Complications: None.  Implants:   Implant Name Type Inv. Item Serial No.  Lot No. LRB No. Used Action   [employer] Size D    [employer] Skin Care [id number] Right 1 Implanted   IMP ANCHOR ARTHREX 4.5MM BIOCOMP CKSCR W/FB [id number] - [id number] Metallic Hardware/[place] IMP ANCHOR ARTHREX 4.5MM BIOCOMP CKSCR W/FB [id number]  ARTHREX [id number] Right 2 Implanted   Suture [place], Bio[employer] SwiveLock; Closed Eyelet (4.75 x 19.1mm)     [id number] Right 1 Implanted

## 2023-05-16 NOTE — ANESTHESIA PROCEDURE NOTES
Brachial plexus Procedure Note    Pre-Procedure   Staff -        Anesthesiologist:  Cristian Kunz MD       Performed By: anesthesiologist       Location: pre-op       Procedure Start/Stop Times: 5/16/2023 10:30 AM       Pre-Anesthestic Checklist: patient identified, IV checked, site marked, risks and benefits discussed, informed consent, monitors and equipment checked, pre-op evaluation, at physician/surgeon's request and post-op pain management  Timeout:       Correct Patient: Yes        Correct Procedure: Yes        Correct Site: Yes        Correct Position: Yes        Correct Laterality: Yes        Site Marked: Yes  Procedure Documentation  Procedure: Brachial plexus       Diagnosis: RIGHT SHOULDER SURGERY       Laterality: right       Patient Position: supine       Skin prep: Chloraprep       Local skin infiltrated with 3 mL of 1% lidocaine.  (interscalene approach).       Needle Type: short bevel       Needle Gauge: 21.        Needle Length (Inches): 4        1. Ultrasound was used to identify targeted nerve, plexus, vascular marker, or fascial plane and place a needle adjacent to it in real-time.       2. Ultrasound was used to visualize the spread of anesthetic in close proximity to the above referenced structure.       3. A permanent image is entered into the patient's record.    Assessment/Narrative         The placement was negative for: blood aspirated, painful injection and site bleeding       Paresthesias: No.       Bolus given via needle..        Secured via.        Insertion/Infusion Method: Single Shot       Complications: none    Medication(s) Administered   Bupivacaine 0.25% PF (Infiltration) - Infiltration   5 mL - 5/16/2023 10:30:00 AM  Bupivacaine liposome (Exparel) 1.3% LA inj susp (Infiltration) - Infiltration   10 mL - 5/16/2023 10:30:00 AM  Medication Administration Time: 5/16/2023 10:30 AM      FOR [facility] ([facility]) ONLY:   Pain Team Contact information: please page the  "Pain Team Via [facility]. Search \"Pain\". During daytime hours, please page the attending first. At night please page the resident first.      "

## 2023-05-16 NOTE — ANESTHESIA CARE TRANSFER NOTE
Patient: Arpita Zafar    Procedure: Procedure(s):  RESURFACING ARTHROPLASTY, RIGHT SHOULDER       Diagnosis: AVN (avascular necrosis of bone) (H) [M87.00]  Diagnosis Additional Information: No value filed.    Anesthesia Type:   General     Note:    Oropharynx: oropharynx clear of all foreign objects and spontaneously breathing  Level of Consciousness: awake  Oxygen Supplementation: nasal cannula  Level of Supplemental Oxygen (L/min / FiO2): 3  Independent Airway: airway patency satisfactory and stable  Dentition: dentition unchanged  Vital Signs Stable: post-procedure vital signs reviewed and stable  Report to RN Given: handoff report given  Patient transferred to: PACU    Handoff Report: Identifed the Patient, Identified the Reponsible Provider, Reviewed the pertinent medical history, Discussed the surgical course, Reviewed Intra-OP anesthesia mangement and issues during anesthesia, Set expectations for post-procedure period and Allowed opportunity for questions and acknowledgement of understanding      Vitals:  Vitals Value Taken Time   /89 05/16/23 1315   Temp 36  C (96.8  F) 05/16/23 1315   Pulse 72 05/16/23 1317   Resp 20 05/16/23 1317   SpO2 99 % 05/16/23 1317   Vitals shown include unvalidated device data.    Electronically Signed By: SALAS Bateman CRNA  May 16, 2023  1:19 PM

## 2023-05-16 NOTE — DISCHARGE INSTRUCTIONS

## 2023-05-16 NOTE — ANESTHESIA PROCEDURE NOTES
Airway       Patient location during procedure: OR  Staff -        Anesthesiologist:  Cristian Kunz MD       CRNA: José Miguel Garcia APRN CRNA       Other Anesthesia Staff: Dionna Coe RN       Performed By: SRNA  Consent for Airway        Urgency: elective  Indications and Patient Condition       Indications for airway management: dinah-procedural       Induction type:intravenous       Mask difficulty assessment: 0 - not attempted    Final Airway Details       Final airway type: supraglottic airway    Supraglottic Airway Details        Type: LMA       Brand: I-Gel       LMA size: 4    Post intubation assessment        Placement verified by: capnometry, equal breath sounds and chest rise        Number of attempts at approach: 1       Number of other approaches attempted: 0       Secured with: paper tape       Ease of procedure: easy       Dentition: Unchanged

## 2023-05-16 NOTE — ANESTHESIA PREPROCEDURE EVALUATION
Anesthesia Pre-Procedure Evaluation    Patient: Arpita Zafar   MRN: [id number] : 1985        Procedure : Procedure(s):  RESURFACING ARTHROPLASTY, RIGHT SHOULDER          Past Medical History:   Diagnosis Date     Abnormal Pap smear of cervix 10/31/2018    See problem list     Cervical high risk HPV (human papillomavirus) test positive 2017, 10/31/18    See problem list      Past Surgical History:   Procedure Laterality Date     ARTHROPLASTY SHOULDER Left 3/9/2021    Procedure: Left Shoulder Resurfacing Hemiarthroplasty;  Surgeon: Steffanie Stockton MD;  Location: [facility] OR     CONIZATION LEEP N/A 1/15/2019    Procedure: LOOP ELECTROSURGICAL EXCISION PROCEDURE WITH CONIZATION OF CERVIX;  Surgeon: Corey Blakely MD;  Location: [facility] OR     HC COLP CERVIX/UPPER VAGINA W ENDOCERV CURETT  2018      BREAST BIOPSY CORE NEEDLE LEFT        Allergies   Allergen Reactions     Nifedipine Rash      Social History     Tobacco Use     Smoking status: Former     Types: Cigarettes     Quit date: 10/31/2015     Years since quittin.5     Smokeless tobacco: Never   Vaping Use     Vaping status: Never Used   Substance Use Topics     Alcohol use: Yes     Comment: occasionally wine      Wt Readings from Last 1 Encounters:   23 73.7 kg (162 lb 6.4 oz)        Anesthesia Evaluation   Pt has had prior anesthetic.     No history of anesthetic complications       ROS/MED HX  ENT/Pulmonary:       Neurologic:       Cardiovascular:     (+) hypertension-----    METS/Exercise Tolerance:     Hematologic:       Musculoskeletal:       GI/Hepatic:       Renal/Genitourinary:       Endo:       Psychiatric/Substance Use:       Infectious Disease:       Malignancy:       Other:            Physical Exam    Airway        Mallampati: II   TM distance: > 3 FB   Neck ROM: full   Mouth opening: > 3 cm    Respiratory Devices and Support         Dental           Cardiovascular   cardiovascular exam normal          Pulmonary    pulmonary exam normal                OUTSIDE LABS:  CBC:   Lab Results   Component Value Date    WBC 3.7 (L) 05/02/2023    WBC 6.1 01/08/2021    HGB 14.3 05/02/2023    HGB 14.0 01/08/2021    HCT 41.6 05/02/2023    HCT 41.4 01/08/2021     05/02/2023     01/08/2021     BMP:   Lab Results   Component Value Date     05/02/2023     03/05/2021    POTASSIUM 3.9 05/02/2023    POTASSIUM 4.1 03/05/2021    CHLORIDE 106 05/02/2023    CHLORIDE 103 03/05/2021    CO2 26 05/02/2023    CO2 28 03/05/2021    BUN 9 05/02/2023    BUN 15 03/05/2021    CR 0.75 05/02/2023    CR 0.78 03/05/2021     (H) 05/02/2023    GLC 84 03/05/2021     COAGS: No results found for: PTT, INR, FIBR  POC:   Lab Results   Component Value Date    HCG Negative 03/09/2021     HEPATIC:   Lab Results   Component Value Date    ALBUMIN 4.0 08/16/2017    PROTTOTAL 7.4 08/16/2017    ALT 22 08/16/2017    AST 9 08/16/2017    ALKPHOS 58 08/16/2017    BILITOTAL 0.6 08/16/2017     OTHER:   Lab Results   Component Value Date    MARY 10.2 (H) 05/02/2023    TSH 1.11 08/16/2017       Anesthesia Plan    ASA Status:  2      Anesthesia Type: General.     - Airway: LMA              Consents    Anesthesia Plan(s) and associated risks, benefits, and realistic alternatives discussed. Questions answered and patient/representative(s) expressed understanding.    - Discussed:     - Discussed with:  Patient    Use of blood products discussed: No .     Postoperative Care    Pain management: Multi-modal analgesia, Peripheral nerve block (Single Shot).   PONV prophylaxis: Ondansetron (or other 5HT-3)     Comments:                Luke Pace MD

## 2023-05-16 NOTE — OR NURSING
Right brachial plexus block complete in preop with fentanyl and versed, tolerated well. Remains on monitoring until OR.

## 2023-05-18 ENCOUNTER — NURSE TRIAGE (OUTPATIENT)
Dept: NURSING | Facility: CLINIC | Age: 38
End: 2023-05-18

## 2023-05-18 DIAGNOSIS — M87.00 AVN (AVASCULAR NECROSIS OF BONE) (H): Primary | ICD-10-CM

## 2023-05-18 NOTE — TELEPHONE ENCOUNTER
Pt calls with right shoulder blade pain 5/10  Been icing and taking oxycodone  The oxy is causing itching  No rash  Would like something new prescribed for pain     Forwarding to clinic to call back    Reason for Disposition    MILD TO MODERATE post-op pain (e.g., pain scale 1-7) that is not controlled with pain medications    Additional Information    Negative: Sounds like a life-threatening emergency to the triager    Negative: Chest pain    Negative: Difficulty breathing    Negative: Acting confused (e.g., disoriented, slurred speech) or excessively sleepy    Negative: Surgical incision symptoms and questions    Negative: Pain or burning with passing urine (urination) and male    Negative: Pain or burning with passing urine (urination) and female    Negative: Constipation    Negative: New or worsening leg (calf, thigh) pain    Negative: New or worsening leg swelling    Negative: Dizziness is severe, or persists > 24 hours after surgery    Negative: Symptoms arising from use of a urinary catheter (Miner or Coude)    Negative: Cast problems or questions    Negative: Medication question    Negative: Bright red, wide-spread, sunburn-like rash    Negative: SEVERE headache and after spinal (epidural) anesthesia    Negative: Vomiting and persists > 4 hours    Negative: Vomiting and abdomen looks much more swollen than usual    Negative: Drinking very little and dehydration suspected (e.g., no urine > 12 hours, very dry mouth, very lightheaded)    Negative: Patient sounds very sick or weak to the triager    Negative: Sounds like a serious complication to the triager    Negative: Fever > 100.4 F (38.0 C)    Negative: Patient wants to be seen    Negative: Fever present > 3 days (72 hours)    Negative: SEVERE post-op pain (e.g., excruciating, pain scale 8-10) that is not controlled with pain medications    Negative: Headache and after spinal (epidural) anesthesia and not severe    Negative: Caller has URGENT question and  "triager unable to answer question    Answer Assessment - Initial Assessment Questions  1. SYMPTOM: \"What's the main symptom you're concerned about?\" (e.g., pain, fever, vomiting)      Right shoulder blade pain  Oxycodone  Causing itching  2. ONSET: \"When did symptoms   start?\"      yesterday  3. SURGERY: \"What surgery did you have?\"      RESURFACING ARTHROPLASTY, RIGHT SHOULDER  4. DATE of SURGERY: \"When was the surgery?\"       5/16/23  5. ANESTHESIA: \" What type of anesthesia did you have?\" (e.g., general, spinal, epidural, local)      gen  6. PAIN: \"Is there any pain?\" If Yes, ask: \"How bad is it?\"  (Scale 1-10; or mild, moderate, severe)      5/10  7. FEVER: \"Do you have a fever?\" If Yes, ask: \"What is your temperature, how was it measured, and when did it start?\"      no  8. VOMITING: \"Is there any vomiting?\" If Yes, ask: \"How many times?\"      no  9. BLEEDING: \"Is there any bleeding?\" If Yes, ask: \"How much?\" and \"Where?\"      no  10. OTHER SYMPTOMS: \"Do you have any other symptoms?\" (e.g., drainage from wound, painful urination, constipation)        no    Protocols used: POST-OP SYMPTOMS AND [id number]      "

## 2023-05-18 NOTE — TELEPHONE ENCOUNTER
[facility] Call Center    Phone Message    May a detailed message be left on voicemail: yes     Reason for Call: Other: Pt is itching from the oxy and would like to know what else she can take she is still in a lot of pain      Action Taken: Other: ortho mg     Travel Screening: Not Applicable

## 2023-05-18 NOTE — TELEPHONE ENCOUNTER
Please call patient ASAP. Sent a msg early this morning and has not heard back yet.   [facility] [address], [place]

## 2023-05-19 ENCOUNTER — THERAPY VISIT (OUTPATIENT)
Dept: PHYSICAL THERAPY | Facility: CLINIC | Age: 38
End: 2023-05-19
Payer: COMMERCIAL

## 2023-05-19 DIAGNOSIS — M25.511 CHRONIC RIGHT SHOULDER PAIN: ICD-10-CM

## 2023-05-19 DIAGNOSIS — Z96.611 STATUS POST RIGHT SHOULDER HEMIARTHROPLASTY: ICD-10-CM

## 2023-05-19 DIAGNOSIS — G89.29 CHRONIC RIGHT SHOULDER PAIN: ICD-10-CM

## 2023-05-19 DIAGNOSIS — Z96.611 STATUS POST RIGHT SHOULDER HEMIARTHROPLASTY: Primary | ICD-10-CM

## 2023-05-19 PROCEDURE — 97110 THERAPEUTIC EXERCISES: CPT | Mod: GP | Performed by: PHYSICAL THERAPIST

## 2023-05-19 PROCEDURE — 97161 PT EVAL LOW COMPLEX 20 MIN: CPT | Mod: GP | Performed by: PHYSICAL THERAPIST

## 2023-05-19 RX ORDER — HYDROXYZINE HYDROCHLORIDE 25 MG/1
25 TABLET, FILM COATED ORAL 3 TIMES DAILY PRN
Qty: 30 TABLET | Refills: 0 | Status: SHIPPED | OUTPATIENT
Start: 2023-05-19

## 2023-05-19 RX ORDER — HYDROCODONE BITARTRATE AND ACETAMINOPHEN 5; 325 MG/1; MG/1
1 TABLET ORAL EVERY 6 HOURS PRN
Qty: 20 TABLET | Refills: 0 | Status: SHIPPED | OUTPATIENT
Start: 2023-05-19 | End: 2023-10-10

## 2023-05-19 NOTE — TELEPHONE ENCOUNTER
Discussed with Dr. Stockton and she was ok with changing medication and adding anti-itch medication. PT order placed and Pt may start now. Will update PT order at next visit. Pt notified.    Kyle Lemos RN

## 2023-05-19 NOTE — TELEPHONE ENCOUNTER
S/p Right shoulder resurfacing arthroplasty (jane), DOS: 5/16/23    Prescribed oxycodone after surgery. This has been helping with her pain, but she would like to try something else if possible to see if it causes less itching. She has not taken anything for itching yet. We discussed changing to Norco and taking either OTC Benadryl or asking Dr. Stockton about hydroxyzine, which is in the same drug class. Pt agreeable with either, she does not have any Benadryl on hand.     She is wearing her sling, using Tylenol and ibuprofen alternating, and also icing.    Will discuss with Dr. Stockton and call Pt back.    Kyle Lemos RN

## 2023-05-19 NOTE — PROGRESS NOTES
PHYSICAL THERAPY EVALUATION  Type of Visit: Evaluation    See electronic medical record for Abuse and Falls Screening details.    Subjective      Presenting condition or subjective complaint: after R shoulder surgery 2023 (s/p R Shoulder Resurfacing Arthroplasty)  Date of onset: 23 (DOS)    Relevant medical history: High blood pressure   Dates & types of surgery:      Prior diagnostic imaging/testing results: X-ray     Prior therapy history for the same diagnosis, illness or injury: No (pt did have similiar issue on L side: surgery and PT)      Prior Level of Function   Transfers: Independent  Ambulation: Independent  ADL: Independent  IADL: Childcare, Driving, Housekeeping, Laundry, Meal preparation, Work, Yard work    Living Environment  Social support: With a significant other or spouse   Type of home: House; Multi-level   Stairs to enter the home: Yes -2 Is there a railing: No   Ramp: No   Stairs inside the home: Yes 6 Is there a railing: Yes   Help at home: Self Cares (home health aide/personal care attendant, family, etc)  Equipment owned:       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  Hobbies/Interests: taking care of her child, house/yard work    Patient goals for therapy: able to use the R arm for daily activities, take care of her child (6 month old girl), no pain    Pain assessment: Pain present  Location: R shoulder/Ratin     Objective   SHOULDER EVALUATION  PAIN: Pain Level at Rest: 6/10  Pain Level with Use: 6/10  Pain Location: shoulder  Pain Quality: Aching  Pain Frequency: constant  Pain is Worst: same all the time  Pain is Exacerbated By: movement of R arm; R shoulder blade can be sore from sitting to long  INTEGUMENTARY (edema, incisions): WNL  POSTURE: Sitting Posture: Rounded shoulders, Forward head  GAIT:     Assistive Device(s): Brace- shoulder harness    Balance/Proprioception: WNL  Weight Bearing Alignment: WNL  ROM:   (Degrees) Left AROM Left PROM  Right AROM  Right PROM   Shoulder Flexion    114   Shoulder Extension    NT   Shoulder Abduction    NT   Shoulder Adduction       Shoulder Internal Rotation    WNL (done at side)   Shoulder External Rotation    6 (done at side)   Shoulder Horizontal Abduction       Shoulder Horizontal Adduction       Shoulder Flexion ER       Shoulder Flexion IR       Elbow Extension    WNL   Elbow Flexion    WNL     STRENGTH: Not assessed at this time due to surgery    Special Tests: No special tests done  PALPATION: general soreness throughout the R UE/ scapulae  region; incision covered with dressing  JOINT MOBILITY: not assessed  CERVICAL SCREEN: ROM is WNL; some tightness reported both sides of neck    Assessment & Plan   CLINICAL IMPRESSIONS   Medical Diagnosis: Status post right shoulder hemiarthroplasty    Treatment Diagnosis: s/p R shoulder surgery/R shoulder pain   Impression/Assessment: Patient is a 38 year old female with R shoulder/ UE complaints.  The following significant findings have been identified: Pain, Decreased ROM/flexibility, Decreased strength, Impaired muscle performance, Decreased activity tolerance and Impaired posture. These impairments interfere with their ability to perform self care tasks, work tasks, recreational activities, household chores and driving  as compared to previous level of function.     Clinical Decision Making (Complexity):   Clinical Presentation: Stable/Uncomplicated  Clinical Presentation Rationale: based on medical and personal factors listed in PT evaluation  Clinical Decision Making (Complexity): Low complexity    PLAN OF CARE  Treatment Interventions:  Modalities: Cryotherapy  Interventions: Manual Therapy, Neuromuscular Re-education, Therapeutic Activity, Therapeutic Exercise, Self-Care/Home Management    Long Term Goals     PT Goal 1  Goal Identifier: REACHING  Goal Description: R shouldder AROM to WNL to allow patient to perfrom ADL's independently.  Rationale: to maximize  safety and independence with performance of ADLs and functional tasks;to maximize safety and independence with self cares  Goal Progress: unable to reach actively with R UE due to surgery precautions: R shoulder PROM: , 6 ER  Target Date: 08/11/23      Frequency of Treatment: 1x/week  Duration of Treatment: x12 weeks    Recommended Referrals to Other Professionals: Physical Therapy  Education Assessment:   Learner/Method: Patient;Pictures/Video;No Barriers to Learning    Risks and benefits of evaluation/treatment have been explained.   Patient/Family/caregiver agrees with Plan of Care.     Evaluation Time:     PT Eval, Low Complexity Minutes (63086): 16   Present: No: not needed    Signing Clinician: Sarah Solis, PT

## 2023-05-24 ENCOUNTER — DOCUMENTATION ONLY (OUTPATIENT)
Dept: ORTHOPEDICS | Facility: CLINIC | Age: 38
End: 2023-05-24
Payer: COMMERCIAL

## 2023-05-24 NOTE — OP NOTE
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 Right shoulder resurfacing hemiarthroplasty.      STAFF SURGEON:  Steffanie Stockton MD      ASSISTANT: Damon Hartman MD     ANESTHESIA:  General endotracheal anesthesia with supplementary interscalene block.      ESTIMATED BLOOD LOSS:  75ml.      IMPLANTS:    1. Catalyst humeral resurfacing, size D     BRIEF PATIENT HISTORY: Arpita is a 38-year-old with collapse of her right shoulder humeral AVN.  Nonoperative treatment has not provided lasting relief of pain or improvement in motion. She underwent a resurfacing on her left side for collapse as well and she has done well from that.  For this reason, she wished to consider surgical intervention.  We discussed the risks and benefits of shoulder arthroplasty and the patient wished to proceed with this surgery.  Informed consent was completed.      DESCRIPTION OF PROCEDURE:  The patient was identified in the preoperative area and correct right shoulder was marked for surgery.  She was provided an interscalene block by our anesthesia colleagues and was taken to the operating room where she was surrendered to general endotracheal anesthesia. She was moved to the operating room table in the beachchair position with all bony prominences well padded, and the head was placed in a head randall with the neck in neutral position.  The right upper extremity was prepped and draped in the usual sterile fashion.  A timeout was held in accordance with hospital policy, confirming correct patient, side, site, procedure, placement of lower extremity, sequential compressive devices and administration of IV antibiotics prior to incision.      I completed a deltopectoral incision and approach.  The cephalic vein was absent from the clear delto-pec interval (same as the  left side).  I entered the deltopectoral interval and freed the subdeltoid adhesions.  I palpated and inspected the bursal surface of the rotator cuff and found it to be intact.  I palpated the axillary nerve medially and laterally performing a tug test confirming location and continuity of the nerve.  This was performed multiple times throughout the procedure including once prior to closure.  I opened the bicipital groove and rotator cuff interval and tenotomized the biceps then tenodesing this to the upper pec.  I released the subscapularis subperiosteally off of the lesser tuberosity.  I released the capsule along the inferior neck of the humerus while protecting the axillary nerve.  I then was able to dislocate the humerus anteriorly.  I used the  guidelines to place the central pin and the prepared the humeral head with the  cut guides. The head then sized to a D. The implant was then securely impacted. The joint was reduced and the implant provided 50% bounce back on the glenoid with ER to 60 and IR to 70 in abduction. The glenoid surface was preserved without advanced chondral change. I then repaired the subscap back with the previously placed 4.5 corkscrew anchors. These were passed in mattress fashion through the subscap and tied down, then brought over to a lateral row with 4.75 SwivelLock x 2.   The wound was copiously irrigated.  The wound was closed in layered fashion with monofilament sutures.  Steri-Strips and a soft sterile dressing were applied.  The arm was placed into an abduction sling.  The patient was extubated and transported to recovery room in stable condition.  There were no apparent intraoperative complications.      POSTOPERATIVE PLAN:   1.  The patient will be allowed passive and active assisted range of motion with forward elevation to 140 and external rotation at the side to 40.  She will be discharged to home when she meets discharge criteria.   2.  The patient  will be allowed to transition to active range of motion at 6 weeks and discontinue the sling at that time.  She will be seen by me for wound check at 2 weeks.         ANA NGO MD

## 2023-05-24 NOTE — PROGRESS NOTES
Forms Received: Accomodation Request  Company: [employer]  Status: Signed and faxed    Carmina Rodriguez on 5/30/2023 at 4:14 PM

## 2023-05-25 ENCOUNTER — THERAPY VISIT (OUTPATIENT)
Dept: PHYSICAL THERAPY | Facility: CLINIC | Age: 38
End: 2023-05-25
Payer: COMMERCIAL

## 2023-05-25 DIAGNOSIS — G89.29 CHRONIC RIGHT SHOULDER PAIN: Primary | ICD-10-CM

## 2023-05-25 DIAGNOSIS — Z47.89 AFTERCARE FOLLOWING SURGERY OF THE MUSCULOSKELETAL SYSTEM: ICD-10-CM

## 2023-05-25 DIAGNOSIS — M25.511 CHRONIC RIGHT SHOULDER PAIN: Primary | ICD-10-CM

## 2023-05-25 PROCEDURE — 97110 THERAPEUTIC EXERCISES: CPT | Mod: GP | Performed by: PHYSICAL THERAPIST

## 2023-06-04 ENCOUNTER — HEALTH MAINTENANCE LETTER (OUTPATIENT)
Age: 38
End: 2023-06-04

## 2023-06-05 ENCOUNTER — OFFICE VISIT (OUTPATIENT)
Dept: ORTHOPEDICS | Facility: CLINIC | Age: 38
End: 2023-06-05
Payer: COMMERCIAL

## 2023-06-05 ENCOUNTER — THERAPY VISIT (OUTPATIENT)
Dept: PHYSICAL THERAPY | Facility: CLINIC | Age: 38
End: 2023-06-05
Payer: COMMERCIAL

## 2023-06-05 DIAGNOSIS — Z96.611 STATUS POST RIGHT SHOULDER HEMIARTHROPLASTY: Primary | ICD-10-CM

## 2023-06-05 DIAGNOSIS — Z47.89 AFTERCARE FOLLOWING SURGERY OF THE MUSCULOSKELETAL SYSTEM: ICD-10-CM

## 2023-06-05 DIAGNOSIS — M25.511 CHRONIC RIGHT SHOULDER PAIN: Primary | ICD-10-CM

## 2023-06-05 DIAGNOSIS — G89.29 CHRONIC RIGHT SHOULDER PAIN: Primary | ICD-10-CM

## 2023-06-05 PROCEDURE — 99024 POSTOP FOLLOW-UP VISIT: CPT | Performed by: ORTHOPAEDIC SURGERY

## 2023-06-05 PROCEDURE — 97110 THERAPEUTIC EXERCISES: CPT | Mod: GP

## 2023-06-05 NOTE — NURSING NOTE
Reason For Visit:   Chief Complaint   Patient presents with     Surgical Followup     3 wks s/p right shoulder resurfacing hemiarthroplasty DOS: 5/16/23  - pain on posterior aspect of shoulder. Had trouble with pain control first week but doing better now.        PCP: [facility]    ?  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  Work status?  Full time.  Date of surgery:S/p Left humeral head resurfacing hemiarthroplasty, sx: 3/9/21   s/p right shoulder resurfacing hemiarthroplasty DOS: 5/16/23   Smoker: No  Request smoking cessation information: No    Right hand dominant        LMP 04/29/2023 (Exact Date)     Ifeoma Best, ATC

## 2023-06-05 NOTE — PROGRESS NOTES
CHIEF CONCERN: Status post right shoulder hemiarthroplasty resurfacing  DATE OF SURGERY: 5/816/23    HISTORY OF PRESENT ILLNESS: Arpita is an extremely pleasant 38 year-old young woman who is 2 weeks status post the above procedure. She had difficult pain the first week but has been fine since.    EXAM:  Pleasant adult female in NAD  Respirations even and unlabored.  Right upper extremity: Incision clean, dry, and intact. Distally neurovascularly intact without deficits. Shoulder range of motion is beginning with PT.    IMAGING: None new    ASSESSMENT:  1. Two weeks status post above procedure    PLAN:    Range of Motion: Continue passive and active assisted ROM of the shoulder per operative note.     Sling: Continue sling except for bathing/dressing/rehab or when sitting at home at rest.    Pain medication: NSAIDs and Tylenol PRN    Follow up: in 4-6 weeks WITH XRAYS.  Will discontinue sling in 3-4 weeks and initiate AROM. Plan to advance to strengthening at 10 weeks.

## 2023-06-05 NOTE — LETTER
6/5/2023         RE: Arpita Zafar  [address]  [place] [address]        Dear Colleague,    Thank you for referring your patient, Arpita Zafar, to the [facility]. Please see a copy of my visit note below.    CHIEF CONCERN: Status post right shoulder hemiarthroplasty resurfacing  DATE OF SURGERY: 5/816/23    HISTORY OF PRESENT ILLNESS: Arpita is an extremely pleasant 38 year-old young woman who is 2 weeks status post the above procedure. She had difficult pain the first week but has been fine since.    EXAM:  Pleasant adult female in NAD  Respirations even and unlabored.  Right upper extremity: Incision clean, dry, and intact. Distally neurovascularly intact without deficits. Shoulder range of motion is beginning with PT.    IMAGING: None new    ASSESSMENT:  1. Two weeks status post above procedure    PLAN:  Range of Motion: Continue passive and active assisted ROM of the shoulder per operative note.   Sling: Continue sling except for bathing/dressing/rehab or when sitting at home at rest.  Pain medication: NSAIDs and Tylenol PRN  Follow up: in 4-6 weeks WITH XRAYS.  Will discontinue sling in 3-4 weeks and initiate AROM. Plan to advance to strengthening at 10 weeks.        Again, thank you for allowing me to participate in the care of your patient.        Sincerely,        Steffanie Stockton MD

## 2023-06-19 ENCOUNTER — MYC MEDICAL ADVICE (OUTPATIENT)
Dept: ORTHOPEDICS | Facility: CLINIC | Age: 38
End: 2023-06-19
Payer: COMMERCIAL

## 2023-06-28 ENCOUNTER — THERAPY VISIT (OUTPATIENT)
Dept: PHYSICAL THERAPY | Facility: CLINIC | Age: 38
End: 2023-06-28
Payer: COMMERCIAL

## 2023-06-28 DIAGNOSIS — G89.29 CHRONIC RIGHT SHOULDER PAIN: Primary | ICD-10-CM

## 2023-06-28 DIAGNOSIS — M25.511 CHRONIC RIGHT SHOULDER PAIN: Primary | ICD-10-CM

## 2023-06-28 DIAGNOSIS — Z47.89 AFTERCARE FOLLOWING SURGERY OF THE MUSCULOSKELETAL SYSTEM: ICD-10-CM

## 2023-06-28 PROCEDURE — 97110 THERAPEUTIC EXERCISES: CPT | Mod: GP

## 2023-06-28 NOTE — Clinical Note
Hello-  Current PT progress note for you to review.  I understand Arpita has been communicating with you via my chart messaging regarding work ability letter for return to work on 7/10/2023 so hopefully the information in the PN will help. Please message if questions. Thank you

## 2023-06-29 NOTE — PROGRESS NOTES
PLAN  Continue therapy per MD protocol.    Beginning/End Dates of Progress Note Reporting Period:   5/19/2023 to 06/28/2023    Referring Provider:  Dr Steffanie Stockton

## 2023-06-29 NOTE — PROGRESS NOTES
PLAN  Progress Physical Therapy per MD protocol    Beginning/End Dates of Progress Note Reporting Period:    5/19/2023 to 06/28/2023    Referring Provider:  Steffanie Stockton MD     06/28/23 0500   Appointment Info   Signing clinician's name / credentials Zoe Mehta, SPT; Sarah Solis, KELLY   Total/Authorized Visits 12   Visits Used 4   Medical Diagnosis Status post right shoulder hemiarthroplasty   PT Tx Diagnosis s/p R shoulder surgery/R shoulder pain   Precautions/Limitations OK for PROM/AAROM; begin AROM in 3-4 weeks; begin strengthening at 10 weeks   Quick Adds Student Supervision   Progress Note/Certification   Onset of illness/injury or Date of Surgery 05/16/23  (DOS)   Therapy Frequency 1x/week   Predicted Duration x12 weeks   Progress Note Due Date 05/19/23  (see's MD for 2 week follow up)   Supervision   Student Supervision Direct supervision provided   PT Goal 1   Goal Identifier REACHING   Goal Description R shoulder AROM to WNL to allow patient to perfrom ADL's independently.   Rationale to maximize safety and independence with performance of ADLs and functional tasks;to maximize safety and independence with self cares   Goal Progress per MD protocol; pt only allowed P/AAROM at this time;  Current PROM: 135 FF- and 32 ER at side.   Target Date 08/11/23   Subjective Report   Subjective Report Pt reports her shoulder is achy and sore, but other than that is doing well. Pt wears sling when up and moving around, but not while sleeping or sitting down. Pt is planning on going back to work 7/10, but is concerned and frustrated about whether she will be cleared to perform her duties due to sling and AAROM.   Objective Measures   Objective Measures Objective Measure 1   Objective Measure 1   Objective Measure R shoulder AAROM   Details flex 140; abduction 90; extension 65; ER (at side) 38   Objective Measure 2   Objective Measure R shoulder PROM   Details abd 90, Flexion 135, ER (at side) 32    Treatment Interventions (PT)   Interventions Therapeutic Procedure/Exercise   Therapeutic Procedure/Exercise   Therapeutic Procedures: strength, endurance, ROM, flexibillity minutes (41601) 32   Ther Proc 1 R shoulder PROM   Ther Proc 1 - Details x 15 min for FF, abd, and ER supine   PTRx Ther Proc 1 Pendulum/Codmans   PTRx Ther Proc 1 - Details 1x10 reps; CW and CCW; Front to Back   PTRx Ther Proc 2 Wand Shoulder Extension Standing   PTRx Ther Proc 2 - Details 10x   PTRx Ther Proc 3 Wand Shoulder Abduction Standing   PTRx Ther Proc 3 - Details 10x to 90 degrees   PTRx Ther Proc 4 Wand Shoulder Flexion in Standing   PTRx Ther Proc 4 - Details 10x   PTRx Ther Proc 5 Wand Shoulder External Rotation in Standing   PTRx Ther Proc 5 - Details x10 reps   PTRx Ther Proc 6 Wand Shoulder External Rotation in Neutral   PTRx Ther Proc 6 - Details x10 reps   Skilled Intervention Passive ROM/AA ROM per MD protocol; cueing on exercise   Patient Response/Progress pt tolerated well, improved ROM   Education   Learner/Method Pictures/Video;Demonstration   Plan   Home program pt to do HEP as instructed daily to comply with MD protocol   Updates to plan of care no changes currently   Plan for next session progress MD protocol as able   Total Session Time   Timed Code Treatment Minutes 32   Total Treatment Time (sum of timed and untimed services) 32

## 2023-07-05 ENCOUNTER — THERAPY VISIT (OUTPATIENT)
Dept: PHYSICAL THERAPY | Facility: CLINIC | Age: 38
End: 2023-07-05
Payer: COMMERCIAL

## 2023-07-05 DIAGNOSIS — Z47.89 AFTERCARE FOLLOWING SURGERY OF THE MUSCULOSKELETAL SYSTEM: ICD-10-CM

## 2023-07-05 DIAGNOSIS — G89.29 CHRONIC RIGHT SHOULDER PAIN: Primary | ICD-10-CM

## 2023-07-05 DIAGNOSIS — M25.511 CHRONIC RIGHT SHOULDER PAIN: Primary | ICD-10-CM

## 2023-07-05 PROCEDURE — 97110 THERAPEUTIC EXERCISES: CPT | Mod: GP

## 2023-07-12 ENCOUNTER — ANCILLARY PROCEDURE (OUTPATIENT)
Dept: GENERAL RADIOLOGY | Facility: CLINIC | Age: 38
End: 2023-07-12
Attending: ORTHOPAEDIC SURGERY
Payer: COMMERCIAL

## 2023-07-12 ENCOUNTER — OFFICE VISIT (OUTPATIENT)
Dept: ORTHOPEDICS | Facility: CLINIC | Age: 38
End: 2023-07-12
Payer: COMMERCIAL

## 2023-07-12 DIAGNOSIS — Z96.611 STATUS POST RIGHT SHOULDER HEMIARTHROPLASTY: ICD-10-CM

## 2023-07-12 DIAGNOSIS — Z96.611 STATUS POST RIGHT SHOULDER HEMIARTHROPLASTY: Primary | ICD-10-CM

## 2023-07-12 PROCEDURE — 73030 X-RAY EXAM OF SHOULDER: CPT | Mod: RT | Performed by: RADIOLOGY

## 2023-07-12 PROCEDURE — 99024 POSTOP FOLLOW-UP VISIT: CPT | Performed by: ORTHOPAEDIC SURGERY

## 2023-07-12 NOTE — LETTER
July 12, 2023      Arpita Zafar  [address] [facility] [address]        To Whom It May Concern:    Arpita Zafar was seen in our clinic. She may return to work on 7/12/23 with the following restrictions: no lift, push, pull, carry with no more than 2 pounds.       Sincerely,        Steffanie Stockton MD

## 2023-07-12 NOTE — PROGRESS NOTES
CHIEF CONCERN: Status post right shoulder hemiarthroplasty resurfacing  DATE OF SURGERY: 5/16/23    HISTORY OF PRESENT ILLNESS: Arpita is an extremely pleasant 38 year-old young woman who is 8 weeks status post the above procedure. She has been out of the sling for ~ 2 weeks. She reports to be doing well. Has full passive ROM with the dowel, but still working on active ROM. No questions or concerns noted today.    EXAM:  Pleasant adult female in NAD  Respirations even and unlabored.  Right upper extremity:    Distally neurovascularly intact without deficits.   Active shoulder range of motion   FE:120  ER: 35  IR: to L2      IMAGING:  3 views of the right shoulder obtained today: interval hemiarthroplasty. Appropriate alignment. No appreciable signs of hardware loosening.     ASSESSMENT:  1.  8 weeks status post above procedure    PLAN:    Range of Motion: Continue active shoulder ROM as tolerated. No lifting/pushing/pulling/carrying  > 2 lbs at work    Sling: Discontinued    Pain medication: NSAIDs and Tylenol PRN    Follow up: Follow up at 3 months post-op. Plan to advance to strengthening at that time    Severiano Viveros MD  Orthopedic Surgery PGY5      I have personally examined this patient and have reviewed the clinical presentation and progress note with the resident.  I agree with the treatment plan as outlined.  The plan was formulated with the resident on the day of the resident's note.

## 2023-07-12 NOTE — LETTER
7/12/2023         RE: Arpita Zafar  [address]  [place] [address]        Dear Colleague,    Thank you for referring your patient, Arpita Zafar, to the [facility]. Please see a copy of my visit note below.    CHIEF CONCERN: Status post right shoulder hemiarthroplasty resurfacing  DATE OF SURGERY: 5/16/23    HISTORY OF PRESENT ILLNESS: Arpita is an extremely pleasant 38 year-old young woman who is 8 weeks status post the above procedure. She has been out of the sling for ~ 2 weeks. She reports to be doing well. Has full passive ROM with the dowel, but still working on active ROM. No questions or concerns noted today.    EXAM:  Pleasant adult female in NAD  Respirations even and unlabored.  Right upper extremity:    Distally neurovascularly intact without deficits.   Active shoulder range of motion   FE:120  ER: 35  IR: to L2      IMAGING:  3 views of the right shoulder obtained today: interval hemiarthroplasty. Appropriate alignment. No appreciable signs of hardware loosening.     ASSESSMENT:  1.  8 weeks status post above procedure    PLAN:  Range of Motion: Continue active shoulder ROM as tolerated. No lifting/pushing/pulling/carrying  > 2 lbs at work  Sling: Discontinued  Pain medication: NSAIDs and Tylenol PRN  Follow up: Follow up at 3 months post-op. Plan to advance to strengthening at that time    Severiano Viveros MD  Orthopedic Surgery PGY5      I have personally examined this patient and have reviewed the clinical presentation and progress note with the resident.  I agree with the treatment plan as outlined.  The plan was formulated with the resident on the day of the resident's note.         Again, thank you for allowing me to participate in the care of your patient.        Sincerely,        Steffanie Stockton MD

## 2023-07-19 ENCOUNTER — THERAPY VISIT (OUTPATIENT)
Dept: PHYSICAL THERAPY | Facility: CLINIC | Age: 38
End: 2023-07-19
Payer: COMMERCIAL

## 2023-07-19 DIAGNOSIS — Z47.89 AFTERCARE FOLLOWING SURGERY OF THE MUSCULOSKELETAL SYSTEM: ICD-10-CM

## 2023-07-19 DIAGNOSIS — M25.511 CHRONIC RIGHT SHOULDER PAIN: Primary | ICD-10-CM

## 2023-07-19 DIAGNOSIS — G89.29 CHRONIC RIGHT SHOULDER PAIN: Primary | ICD-10-CM

## 2023-07-19 PROCEDURE — 97110 THERAPEUTIC EXERCISES: CPT | Mod: GP

## 2023-07-26 ENCOUNTER — DOCUMENTATION ONLY (OUTPATIENT)
Dept: ORTHOPEDICS | Facility: CLINIC | Age: 38
End: 2023-07-26

## 2023-07-26 NOTE — PROGRESS NOTES
Forms Received: Workability  Company:  [employer]   Status: signed and faxed back  Carmina Rodriguez on 8/1/2023 at 12:50 PM

## 2023-08-21 ENCOUNTER — THERAPY VISIT (OUTPATIENT)
Dept: PHYSICAL THERAPY | Facility: CLINIC | Age: 38
End: 2023-08-21
Payer: COMMERCIAL

## 2023-08-21 DIAGNOSIS — Z47.89 AFTERCARE FOLLOWING SURGERY OF THE MUSCULOSKELETAL SYSTEM: ICD-10-CM

## 2023-08-21 DIAGNOSIS — G89.29 CHRONIC RIGHT SHOULDER PAIN: Primary | ICD-10-CM

## 2023-08-21 DIAGNOSIS — M25.511 CHRONIC RIGHT SHOULDER PAIN: Primary | ICD-10-CM

## 2023-08-21 PROCEDURE — 97110 THERAPEUTIC EXERCISES: CPT | Mod: GP | Performed by: PHYSICAL THERAPIST

## 2023-08-29 DIAGNOSIS — Z96.611 HISTORY OF HEMIARTHROPLASTY OF RIGHT SHOULDER: Primary | ICD-10-CM

## 2023-09-01 ENCOUNTER — OFFICE VISIT (OUTPATIENT)
Dept: ORTHOPEDICS | Facility: CLINIC | Age: 38
End: 2023-09-01
Payer: COMMERCIAL

## 2023-09-01 ENCOUNTER — ANCILLARY PROCEDURE (OUTPATIENT)
Dept: GENERAL RADIOLOGY | Facility: CLINIC | Age: 38
End: 2023-09-01
Attending: ORTHOPAEDIC SURGERY
Payer: COMMERCIAL

## 2023-09-01 DIAGNOSIS — Z96.611 HISTORY OF HEMIARTHROPLASTY OF RIGHT SHOULDER: ICD-10-CM

## 2023-09-01 DIAGNOSIS — Z96.611 HISTORY OF HEMIARTHROPLASTY OF RIGHT SHOULDER: Primary | ICD-10-CM

## 2023-09-01 PROCEDURE — 99213 OFFICE O/P EST LOW 20 MIN: CPT | Mod: GC | Performed by: ORTHOPAEDIC SURGERY

## 2023-09-01 PROCEDURE — 73030 X-RAY EXAM OF SHOULDER: CPT | Mod: RT | Performed by: RADIOLOGY

## 2023-09-01 NOTE — LETTER
9/1/2023         RE: Arpita Zafar  [address]  [place] [address]        Dear Colleague,    Thank you for referring your patient, Arpita Zafar, to the [facility]. Please see a copy of my visit note below.    CHIEF CONCERN: Status post right shoulder hemiarthroplasty resurfacing  DATE OF SURGERY: 5/16/23    HISTORY OF PRESENT ILLNESS: Arpita is an extremely pleasant 38 year-old young woman who is 3.5 months status post the above procedure. She is doing well and working with PT. No questions or concerns noted today.    EXAM:  Pleasant adult female in NAD  Respirations even and unlabored.  Right upper extremity:    Distally neurovascularly intact without deficits.   Active shoulder range of motion   FE:170  ER: 60  IR: to L2    IMAGING:  3 views of the right shoulder obtained today: hemiarthroplasty. Appropriate alignment. No appreciable signs of hardware loosening.     ASSESSMENT:  1.  3.5 months status post above procedure    PLAN:  Range of Motion: Continue active shoulder ROM as tolerated. Begin strengthening. Ok to starting lifting and doing strength work in controlled setting gradually. No lifting/pushing/pulling/carrying  > 10 lbs at work  Sling: Discontinued  Pain medication: NSAIDs and Tylenol PRN  Follow up: Follow up PRN    Yuly Mclaughlin MD   Sports Fellow     I have personally examined this patient and have reviewed the clinical presentation and progress note with the fellow.  I agree with the treatment plan as outlined.  The plan was formulated with the fellow on the day of the fellow's note.     Steffanie Stockton MD      Again, thank you for allowing me to participate in the care of your patient.        Sincerely,        Steffanie Stockton MD

## 2023-09-01 NOTE — PROGRESS NOTES
CHIEF CONCERN: Status post right shoulder hemiarthroplasty resurfacing  DATE OF SURGERY: 5/16/23    HISTORY OF PRESENT ILLNESS: Arpita is an extremely pleasant 38 year-old young woman who is 3.5 months status post the above procedure. She is doing well and working with PT. No questions or concerns noted today.    EXAM:  Pleasant adult female in NAD  Respirations even and unlabored.  Right upper extremity:    Distally neurovascularly intact without deficits.   Active shoulder range of motion   FE:170  ER: 60  IR: to L2    IMAGING:  3 views of the right shoulder obtained today: hemiarthroplasty. Appropriate alignment. No appreciable signs of hardware loosening.     ASSESSMENT:  1.  3.5 months status post above procedure    PLAN:  Range of Motion: Continue active shoulder ROM as tolerated. Begin strengthening. Ok to starting lifting and doing strength work in controlled setting gradually. No lifting/pushing/pulling/carrying  > 10 lbs at work  Sling: Discontinued  Pain medication: NSAIDs and Tylenol PRN  Follow up: Follow up PRN    Yuly Mclaughlin MD   Sports Fellow     I have personally examined this patient and have reviewed the clinical presentation and progress note with the fellow.  I agree with the treatment plan as outlined.  The plan was formulated with the fellow on the day of the fellow's note.     Steffanie Stockton MD

## 2023-09-01 NOTE — NURSING NOTE
"Reason For Visit:   Chief Complaint   Patient presents with    RECHECK      XOA (patrick) S/p Right shoulder resurfacing arthroplasty (jane), DOS: 5/16/23       PCP: [facility] - [facility]  Ref: none    ?  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  Work status?  Full time   Date of injury: 2021   Type of injury: no.  Date of surgery: 5/16/23  Type of surgery: partial shoulder .  Smoker: No  Request smoking cessation information: No    right hand dominant    SANE score  Affected shoulder: about a \"B\"  Right shoulder SANE: 80  Left shoulder SANE: 100    No questions      There were no vitals taken for this visit.    Alexander Copeladn MA    "

## 2023-09-06 ENCOUNTER — THERAPY VISIT (OUTPATIENT)
Dept: PHYSICAL THERAPY | Facility: CLINIC | Age: 38
End: 2023-09-06
Payer: COMMERCIAL

## 2023-09-06 DIAGNOSIS — M25.511 CHRONIC RIGHT SHOULDER PAIN: Primary | ICD-10-CM

## 2023-09-06 DIAGNOSIS — G89.29 CHRONIC RIGHT SHOULDER PAIN: Primary | ICD-10-CM

## 2023-09-06 DIAGNOSIS — Z47.89 AFTERCARE FOLLOWING SURGERY OF THE MUSCULOSKELETAL SYSTEM: ICD-10-CM

## 2023-09-06 PROCEDURE — 97112 NEUROMUSCULAR REEDUCATION: CPT | Mod: GP | Performed by: PHYSICAL THERAPIST

## 2023-09-06 PROCEDURE — 97110 THERAPEUTIC EXERCISES: CPT | Mod: GP | Performed by: PHYSICAL THERAPIST

## 2023-09-07 ENCOUNTER — DOCUMENTATION ONLY (OUTPATIENT)
Dept: ORTHOPEDICS | Facility: CLINIC | Age: 38
End: 2023-09-07
Payer: COMMERCIAL

## 2023-09-07 NOTE — PROGRESS NOTES
Forms Received: Workability   Company: [employer]  Status: Signed and faxed back 09/01/2023. Re-faxed 09/07/2023    Carmina Rodriguez on 9/7/2023 at 8:41 AM

## 2023-09-20 ENCOUNTER — THERAPY VISIT (OUTPATIENT)
Dept: PHYSICAL THERAPY | Facility: CLINIC | Age: 38
End: 2023-09-20
Payer: COMMERCIAL

## 2023-09-20 DIAGNOSIS — G89.29 CHRONIC RIGHT SHOULDER PAIN: Primary | ICD-10-CM

## 2023-09-20 DIAGNOSIS — M25.511 CHRONIC RIGHT SHOULDER PAIN: Primary | ICD-10-CM

## 2023-09-20 DIAGNOSIS — Z47.89 AFTERCARE FOLLOWING SURGERY OF THE MUSCULOSKELETAL SYSTEM: ICD-10-CM

## 2023-09-20 PROCEDURE — 97112 NEUROMUSCULAR REEDUCATION: CPT | Mod: GP | Performed by: PHYSICAL THERAPIST

## 2023-09-20 PROCEDURE — 97110 THERAPEUTIC EXERCISES: CPT | Mod: GP | Performed by: PHYSICAL THERAPIST

## 2023-09-20 NOTE — PROGRESS NOTES
PLAN  Patient to trial HEP at this time and return if she feels it is needed.  Reviewed HEP and how to progress.    Beginning/End Dates of Progress Note Reporting Period:    to 09/20/2023    Referring Provider:  Steffanie Stockton MD     09/20/23 0500   Appointment Info   Signing clinician's name / credentials KELLY Good   Total/Authorized Visits 12   Visits Used 9   Medical Diagnosis Status post right shoulder hemiarthroplasty   PT Tx Diagnosis s/p R shoulder surgery/R shoulder pain   Progress Note/Certification   Onset of illness/injury or Date of Surgery 05/16/23  (DOS)   Therapy Frequency 1x/week   Predicted Duration x12 weeks   Progress Note Due Date 09/01/23  (next MD)   GOALS   PT Goals 2   PT Goal 1   Goal Identifier REACHING   Goal Description R shoulder AROM to WNL to allow patient to perfrom ADL's independently.   Rationale to maximize safety and independence with performance of ADLs and functional tasks;to maximize safety and independence with self cares   Goal Progress R shoulder AROM is WNL   Target Date 08/11/23  (pt to follow up with MD on 9/1/2023)   Date Met 09/06/23   PT Goal 2   Goal Description able to reach overhead to 2nd shelf;  2# wt 10x   Rationale to maximize safety and independence with performance of ADLs and functional tasks;to maximize safety and independence within the home;to maximize safety and independence with self cares   Goal Progress pt able to reach OH to 2nd shelf in clinic with 2# wt in R hand x10;   Target Date 10/31/23   Date Met 09/20/23   Subjective Report   Subjective Report pt stated overall the R shoulder is doing well; little stiffness today but has not done her HEP well this week; reported she did wake up one day with some soreness but it has resolved.   Objective Measures   Objective Measures Objective Measure 1   Objective Measure 1   Objective Measure R shoulder AROM   Details flex= 162; Abd=154; Ext=  52; Ext/IR ~T10  (ROM regressed due to  "tightness today)   Objective Measure 2   Objective Measure R shoulder strength   Details 4/5 Flexion, 3+/5 Abd, 4/5 ext   Treatment Interventions (PT)   Interventions Therapeutic Procedure/Exercise;Neuromuscular Re-education   Therapeutic Procedure/Exercise   Therapeutic Procedures: strength, endurance, ROM, flexibillity minutes (77808) 28   Therapeutic Procedures Ther Proc 2   Ther Proc 1 R shoulder PROM   Ther Proc 1 - Details x5 min for FF, abd, and ER supine   Ther Proc 2 UBE   Ther Proc 2 - Details x5' F/B- no resistance   PTRx Ther Proc 1 Pendulum/Codmans   PTRx Ther Proc 1 - Details PRN   PTRx Ther Proc 2 Wand Shoulder External Rotation at 90 degrees Abduction   PTRx Ther Proc 2 - Details verbally reviewed; PROM   PTRx Ther Proc 3 Wand Shoulder Extension Standing   PTRx Ther Proc 3 - Details verbally reviewed   PTRx Ther Proc 4 Wand Shoulder Abduction Standing   PTRx Ther Proc 4 - Details verbally reviewed; PROM   PTRx Ther Proc 5 Wand Shoulder Flexion in Standing   PTRx Ther Proc 5 - Details verbally reviewed; PROM   PTRx Ther Proc 6 Wall Climb   PTRx Ther Proc 6 - Details 10x   PTRx Ther Proc 7 Four Corner Stretch External Rotation With Abduction   PTRx Ther Proc 7 - Details 5x5\" hold   PTRx Ther Proc 8 Four Corner Stretch Passive Shoulder Abduction With Trunk Rotation   PTRx Ther Proc 8 - Details 5x5\" hold   PTRx Ther Proc 9 Shoulder Towel Stretch Internal Rotation   PTRx Ther Proc 9 - Details 5x10\" hold   PTRx Ther Proc 10 Shoulder Flexion   PTRx Ther Proc 10 - Details x15; 2#   PTRx Ther Proc 11 Shoulder Scaption Full Can   PTRx Ther Proc 11 - Details x15; 2#   PTRx Ther Proc 12 Shoulder Theraband Internal Rotation   PTRx Ther Proc 12 - Details x15; Blue TB   PTRx Ther Proc 13 Shoulder Theraband External Rotation   PTRx Ther Proc 13 - Details x10 RTB   PTRx Ther Proc 14 Shoulder External Rotation Sidelying   PTRx Ther Proc 14 - Details HEP   Skilled Intervention reveiwed current hEP; cuing on adjustments "   Patient Response/Progress pt tolerated well, improved ROM   Neuromuscular Re-education   Neuromuscular re-ed of mvmt, balance, coord, kinesthetic sense, posture, proprioception minutes (80634) 12   PTRx Neuro Re-ed 1 Shoulder Theraband Rows   PTRx Neuro Re-ed 1 - Details 20x Blue TB   PTRx Neuro Re-ed 2 Lat Pulldown   PTRx Neuro Re-ed 2 - Details HEP   PTRx Neuro Re-ed 3 Shoulder Theraband Extension   PTRx Neuro Re-ed 3 - Details x17; Blue TB   PTRx Neuro Re-ed 4 Shoulder Theraband Adduction   PTRx Neuro Re-ed 4 - Details x15; blue TB   Skilled Intervention reviewed; cueing on adjustments   Patient Response/Progress tolerated well   Education   Learner/Method Pictures/Video;Demonstration   Plan   Home program reviewed HEP   Updates to plan of care pt to continue with HEP; pt to call if she feels she needs addtional PT   Plan for next session ongoing strengthening if pt returns   Total Session Time   Timed Code Treatment Minutes 40   Total Treatment Time (sum of timed and untimed services) 40

## 2023-10-03 ENCOUNTER — MYC MEDICAL ADVICE (OUTPATIENT)
Dept: FAMILY MEDICINE | Facility: CLINIC | Age: 38
End: 2023-10-03
Payer: COMMERCIAL

## 2023-10-10 ENCOUNTER — OFFICE VISIT (OUTPATIENT)
Dept: FAMILY MEDICINE | Facility: CLINIC | Age: 38
End: 2023-10-10
Payer: COMMERCIAL

## 2023-10-10 VITALS
WEIGHT: 161 LBS | HEIGHT: 66 IN | SYSTOLIC BLOOD PRESSURE: 138 MMHG | BODY MASS INDEX: 25.88 KG/M2 | OXYGEN SATURATION: 98 % | TEMPERATURE: 97.6 F | RESPIRATION RATE: 16 BRPM | DIASTOLIC BLOOD PRESSURE: 86 MMHG | HEART RATE: 80 BPM

## 2023-10-10 DIAGNOSIS — Z12.4 CERVICAL CANCER SCREENING: ICD-10-CM

## 2023-10-10 DIAGNOSIS — N64.4 BREAST TENDERNESS: ICD-10-CM

## 2023-10-10 DIAGNOSIS — E83.52 SERUM CALCIUM ELEVATED: ICD-10-CM

## 2023-10-10 DIAGNOSIS — D72.819 LEUKOPENIA, UNSPECIFIED TYPE: ICD-10-CM

## 2023-10-10 DIAGNOSIS — I10 HYPERTENSION, UNSPECIFIED TYPE: ICD-10-CM

## 2023-10-10 DIAGNOSIS — N91.2 AMENORRHEA, UNSPECIFIED: ICD-10-CM

## 2023-10-10 DIAGNOSIS — Z12.31 VISIT FOR SCREENING MAMMOGRAM: ICD-10-CM

## 2023-10-10 DIAGNOSIS — N60.19 FIBROCYSTIC BREAST DISEASE (FCBD), UNSPECIFIED LATERALITY: ICD-10-CM

## 2023-10-10 DIAGNOSIS — Z00.00 ROUTINE GENERAL MEDICAL EXAMINATION AT A HEALTH CARE FACILITY: Primary | ICD-10-CM

## 2023-10-10 DIAGNOSIS — Z13.220 LIPID SCREENING: ICD-10-CM

## 2023-10-10 PROBLEM — O11.9 PREECLAMPSIA COMPLICATING HYPERTENSION: Status: RESOLVED | Noted: 2022-11-23 | Resolved: 2023-10-10

## 2023-10-10 PROBLEM — O11.9 PREECLAMPSIA COMPLICATING HYPERTENSION: Status: ACTIVE | Noted: 2022-11-23

## 2023-10-10 LAB
ANION GAP SERPL CALCULATED.3IONS-SCNC: 10 MMOL/L (ref 7–15)
BASO+EOS+MONOS # BLD AUTO: NORMAL 10*3/UL
BASO+EOS+MONOS NFR BLD AUTO: NORMAL %
BASOPHILS # BLD AUTO: 0.1 10E3/UL (ref 0–0.2)
BASOPHILS NFR BLD AUTO: 1 %
BUN SERPL-MCNC: 8.2 MG/DL (ref 6–20)
CALCIUM SERPL-MCNC: 9.5 MG/DL (ref 8.6–10)
CHLORIDE SERPL-SCNC: 103 MMOL/L (ref 98–107)
CHOLEST SERPL-MCNC: 174 MG/DL
CREAT SERPL-MCNC: 0.7 MG/DL (ref 0.51–0.95)
DEPRECATED HCO3 PLAS-SCNC: 25 MMOL/L (ref 22–29)
EGFRCR SERPLBLD CKD-EPI 2021: >90 ML/MIN/1.73M2
EOSINOPHIL # BLD AUTO: 0 10E3/UL (ref 0–0.7)
EOSINOPHIL NFR BLD AUTO: 1 %
ERYTHROCYTE [DISTWIDTH] IN BLOOD BY AUTOMATED COUNT: 12.8 % (ref 10–15)
GLUCOSE SERPL-MCNC: 95 MG/DL (ref 70–99)
HCG UR QL: NEGATIVE
HCT VFR BLD AUTO: 41.4 % (ref 35–47)
HDLC SERPL-MCNC: 69 MG/DL
HGB BLD-MCNC: 14.2 G/DL (ref 11.7–15.7)
IMM GRANULOCYTES # BLD: 0 10E3/UL
IMM GRANULOCYTES NFR BLD: 0 %
LDLC SERPL CALC-MCNC: 85 MG/DL
LYMPHOCYTES # BLD AUTO: 1.1 10E3/UL (ref 0.8–5.3)
LYMPHOCYTES NFR BLD AUTO: 21 %
MCH RBC QN AUTO: 33 PG (ref 26.5–33)
MCHC RBC AUTO-ENTMCNC: 34.3 G/DL (ref 31.5–36.5)
MCV RBC AUTO: 96 FL (ref 78–100)
MONOCYTES # BLD AUTO: 0.3 10E3/UL (ref 0–1.3)
MONOCYTES NFR BLD AUTO: 7 %
NEUTROPHILS # BLD AUTO: 3.7 10E3/UL (ref 1.6–8.3)
NEUTROPHILS NFR BLD AUTO: 71 %
NONHDLC SERPL-MCNC: 105 MG/DL
PLATELET # BLD AUTO: 286 10E3/UL (ref 150–450)
POTASSIUM SERPL-SCNC: 4.3 MMOL/L (ref 3.4–5.3)
RBC # BLD AUTO: 4.3 10E6/UL (ref 3.8–5.2)
SODIUM SERPL-SCNC: 138 MMOL/L (ref 135–145)
TRIGL SERPL-MCNC: 99 MG/DL
WBC # BLD AUTO: 5.3 10E3/UL (ref 4–11)

## 2023-10-10 PROCEDURE — 81025 URINE PREGNANCY TEST: CPT | Performed by: NURSE PRACTITIONER

## 2023-10-10 PROCEDURE — 36415 COLL VENOUS BLD VENIPUNCTURE: CPT | Performed by: NURSE PRACTITIONER

## 2023-10-10 PROCEDURE — 99395 PREV VISIT EST AGE 18-39: CPT | Mod: 25 | Performed by: NURSE PRACTITIONER

## 2023-10-10 PROCEDURE — 87624 HPV HI-RISK TYP POOLED RSLT: CPT | Performed by: NURSE PRACTITIONER

## 2023-10-10 PROCEDURE — 90686 IIV4 VACC NO PRSV 0.5 ML IM: CPT | Performed by: NURSE PRACTITIONER

## 2023-10-10 PROCEDURE — 80048 BASIC METABOLIC PNL TOTAL CA: CPT | Performed by: NURSE PRACTITIONER

## 2023-10-10 PROCEDURE — 85025 COMPLETE CBC W/AUTO DIFF WBC: CPT | Performed by: NURSE PRACTITIONER

## 2023-10-10 PROCEDURE — 80061 LIPID PANEL: CPT | Performed by: NURSE PRACTITIONER

## 2023-10-10 PROCEDURE — 99214 OFFICE O/P EST MOD 30 MIN: CPT | Mod: 25 | Performed by: NURSE PRACTITIONER

## 2023-10-10 PROCEDURE — G0145 SCR C/V CYTO,THINLAYER,RESCR: HCPCS | Performed by: NURSE PRACTITIONER

## 2023-10-10 PROCEDURE — 90471 IMMUNIZATION ADMIN: CPT | Performed by: NURSE PRACTITIONER

## 2023-10-10 RX ORDER — LABETALOL 200 MG/1
200 TABLET, FILM COATED ORAL 2 TIMES DAILY
Qty: 180 TABLET | Refills: 1 | Status: SHIPPED | OUTPATIENT
Start: 2023-10-10 | End: 2024-04-01

## 2023-10-10 ASSESSMENT — ENCOUNTER SYMPTOMS
SORE THROAT: 0
DYSURIA: 0
SHORTNESS OF BREATH: 0
BREAST MASS: 0
CONSTIPATION: 0
HEMATURIA: 0
PARESTHESIAS: 0
NAUSEA: 0
DIZZINESS: 0
FREQUENCY: 0
EYE PAIN: 0
HEMATOCHEZIA: 0
ARTHRALGIAS: 0
WEAKNESS: 0
HEADACHES: 0
FEVER: 0
PALPITATIONS: 0
HEARTBURN: 0
MYALGIAS: 0
CHILLS: 0
NERVOUS/ANXIOUS: 0
ABDOMINAL PAIN: 0
DIARRHEA: 0
COUGH: 0
JOINT SWELLING: 0

## 2023-10-10 ASSESSMENT — PAIN SCALES - GENERAL: PAINLEVEL: NO PAIN (0)

## 2023-10-10 NOTE — PROGRESS NOTES
SUBJECTIVE:   CC: Arpita is an 38 year old who presents for preventive health visit.       10/10/2023     8:44 AM   Additional Questions   Roomed by Lety GOFF CMA       Healthy Habits:     Getting at least 3 servings of Calcium per day:  Yes    Bi-annual eye exam:  Yes    Dental care twice a year:  Yes    Sleep apnea or symptoms of sleep apnea:  None    Diet:  Regular (no restrictions)    Frequency of exercise:  2-3 days/week    Duration of exercise:  30-45 minutes    Taking medications regularly:  Yes    Medication side effects:  None    Additional concerns today:  No        Hypertension Follow-up    Do you check your blood pressure regularly outside of the clinic? No   Are you following a low salt diet? Yes  Are your blood pressures ever more than 140 on the top number (systolic) OR more   than 90 on the bottom number (diastolic), for example 140/90? No  Have you ever done Advance Care Planning? (For example, a Health Directive, POLST, or a discussion with a medical provider or your loved ones about your wishes): No, advance care planning information given to patient to review.  Patient declined advance care planning discussion at this time.    Social History     Tobacco Use    Smoking status: Former     Types: Cigarettes     Quit date: 10/31/2015     Years since quittin.9     Passive exposure: Past    Smokeless tobacco: Never   Substance Use Topics    Alcohol use: Yes     Comment: occasionally wine             10/10/2023     8:37 AM   Alcohol Use   Prescreen: >3 drinks/day or >7 drinks/week? No     Reviewed orders with patient.  Reviewed health maintenance and updated orders accordingly - Yes  Lab work is in process    Breast Cancer Screening:    FHS-7:       2023     8:38 AM 10/10/2023     8:38 AM   Breast CA Risk Assessment (FHS-7)   Did any of your first-degree relatives have breast or ovarian cancer? No No   Did any of your relatives have bilateral breast cancer? No No   Did any man in your family  have breast cancer? No No   Did any woman in your family have breast and ovarian cancer? No No   Did any woman in your family have breast cancer before age 50 y? No No   Do you have 2 or more relatives with breast and/or ovarian cancer? No No   Do you have 2 or more relatives with breast and/or bowel cancer? No No          Pertinent mammograms are reviewed under the imaging tab.    History of abnormal Pap smear: YES - updated in Problem List and Health Maintenance accordingly      Latest Ref Rng & Units 3/5/2020     5:27 PM 3/5/2020     1:40 PM 11/15/2018     2:03 PM   PAP / HPV   PAP (Historical)  NIL   ASC-H    HPV 16 DNA NEG^Negative  Negative     HPV 18 DNA NEG^Negative  Negative     Other HR HPV NEG^Negative  Negative       Reviewed and updated as needed this visit by clinical staff   Tobacco  Allergies  Meds              Reviewed and updated as needed this visit by Provider                 Past Medical History:   Diagnosis Date    Abnormal Pap smear of cervix 10/31/2018    See problem list    Cervical high risk HPV (human papillomavirus) test positive 08/16/2017    See problem list    Preeclampsia complicating hypertension 11/23/2022    Preeclampsia in postpartum period 11/23/2022      Past Surgical History:   Procedure Laterality Date    ARTHROPLASTY SHOULDER Left 3/9/2021    Procedure: Left Shoulder Resurfacing Hemiarthroplasty;  Surgeon: Steffanie Stockton MD;  Location: [facility] OR    ARTHROPLASTY SHOULDER Right 5/16/2023    Procedure: RESURFACING ARTHROPLASTY, RIGHT SHOULDER;  Surgeon: Steffanie Stockton MD;  Location: [facility] OR    CONIZATION LEEP N/A 1/15/2019    Procedure: LOOP ELECTROSURGICAL EXCISION PROCEDURE WITH CONIZATION OF CERVIX;  Surgeon: Corey Blakely MD;  Location: [facility] OR    HC COLP CERVIX/UPPER VAGINA W ENDOCERV CURETT  2018    US BREAST BIOPSY CORE NEEDLE LEFT  2018       Review of Systems   Constitutional:  Negative for chills and fever.   HENT:  Negative for congestion, ear  "pain, hearing loss and sore throat.    Eyes:  Negative for pain and visual disturbance.   Respiratory:  Negative for cough and shortness of breath.    Cardiovascular:  Negative for chest pain, palpitations and peripheral edema.   Gastrointestinal:  Negative for abdominal pain, constipation, diarrhea, heartburn, hematochezia and nausea.   Breasts:  Positive for tenderness. Negative for breast mass and discharge.   Genitourinary:  Negative for dysuria, frequency, genital sores, hematuria, pelvic pain, urgency, vaginal bleeding and vaginal discharge.   Musculoskeletal:  Negative for arthralgias, joint swelling and myalgias.   Skin:  Negative for rash.   Neurological:  Negative for dizziness, weakness, headaches and paresthesias.   Psychiatric/Behavioral:  Negative for mood changes. The patient is not nervous/anxious.           OBJECTIVE:   /86 (BP Location: Left arm, Patient Position: Sitting, Cuff Size: Adult Regular)   Pulse 80   Temp 97.6  F (36.4  C) (Oral)   Resp 16   Ht 1.664 m (5' 5.5\")   Wt 73 kg (161 lb)   LMP 09/04/2023 (Approximate)   SpO2 98%   Breastfeeding No   BMI 26.38 kg/m    Physical Exam  GENERAL: healthy, alert and no distress  EYES: Eyes grossly normal to inspection, PERRL and conjunctivae and sclerae normal  HENT: ear canals and TM's normal, nose and mouth without ulcers or lesions  NECK: no adenopathy, no asymmetry, masses, or scars and thyroid normal to palpation  RESP: lungs clear to auscultation - no rales, rhonchi or wheezes  BREAST: no distinct masses but breasts are fibrocystic bilaterally and difficult to examine L>R  CV: regular rate and rhythm, normal S1 S2, no S3 or S4, no murmur, click or rub, no peripheral edema and peripheral pulses strong  ABDOMEN: soft, nontender, no hepatosplenomegaly, no masses and bowel sounds normal   (female): normal female external genitalia, normal urethral meatus, vaginal mucosa pink, moist, well rugated, and normal cervix/adnexa/uterus " "without masses or discharge.  Pap taken with cytobrush & spatula- Thin Prep sample obtained.   MS: no gross musculoskeletal defects noted, no edema  SKIN: no suspicious lesions or rashes  NEURO: Normal strength and tone, mentation intact and speech normal  PSYCH: mentation appears normal, affect normal/bright    Diagnostic Test Results:  Labs reviewed in Epic    ASSESSMENT/PLAN:       ICD-10-CM    1. Routine general medical examination at a health care facility  Z00.00       2. Hypertension, unspecified type  I10 labetalol (NORMODYNE) 200 MG tablet     Basic metabolic panel     Basic metabolic panel      3. Cervical cancer screening  Z12.4 Pap Screen with HPV - recommended age 30 - 65 years      4. Amenorrhea, unspecified  N91.2 HCG qualitative urine     HCG qualitative urine      5. Leukopenia, unspecified type  D72.819 CBC with platelets and differential     CBC with platelets and differential      6. Lipid screening  Z13.220 Lipid panel reflex to direct LDL Fasting     Lipid panel reflex to direct LDL Fasting      7. Serum calcium elevated  E83.52 Basic metabolic panel     Basic metabolic panel      8. Fibrocystic breast disease (FCBD), unspecified laterality  N60.19 MA Screen Bilateral w/Adriano      9. Visit for screening mammogram  Z12.31 MA Screen Bilateral w/Adriano      10. Breast tenderness  N64.4 MA Screen Bilateral w/Adriano          Patient has been advised of split billing requirements and indicates understanding: Yes      COUNSELING:  Reviewed preventive health counseling, as reflected in patient instructions      BMI:   Estimated body mass index is 26.38 kg/m  as calculated from the following:    Height as of this encounter: 1.664 m (5' 5.5\").    Weight as of this encounter: 73 kg (161 lb).   Weight management plan: Discussed healthy diet and exercise guidelines      She reports that she quit smoking about 7 years ago. Her smoking use included cigarettes. She has been exposed to tobacco smoke. She has never " used smokeless tobacco.          Kathi Barbour, CNP  [facility]

## 2023-10-10 NOTE — NURSING NOTE
Prior to immunization administration, verified patients identity using patient s name and date of birth. Please see Immunization Activity for additional information.     Screening Questionnaire for Adult Immunization    Are you sick today?   No   Do you have allergies to medications, food, a vaccine component or latex?   No   Have you ever had a serious reaction after receiving a vaccination?   No   Do you have a long-term health problem with heart, lung, kidney, or metabolic disease (e.g., diabetes), asthma, a blood disorder, no spleen, complement component deficiency, a cochlear implant, or a spinal fluid leak?  Are you on long-term aspirin therapy?   No   Do you have cancer, leukemia, HIV/AIDS, or any other immune system problem?   No   Do you have a parent, brother, or sister with an immune system problem?   No   In the past 3 months, have you taken medications that affect  your immune system, such as prednisone, other steroids, or anticancer drugs; drugs for the treatment of rheumatoid arthritis, Crohn s disease, or psoriasis; or have you had radiation treatments?   No   Have you had a seizure, or a brain or other nervous system problem?   No   During the past year, have you received a transfusion of blood or blood    products, or been given immune (gamma) globulin or antiviral drug?   No   For women: Are you pregnant or is there a chance you could become       pregnant during the next month?   No   Have you received any vaccinations in the past 4 weeks?   No     Immunization questionnaire answers were all negative.      Patient instructed to remain in clinic for 15 minutes afterwards, and to report any adverse reactions.     Screening performed by Nahed Werner CMA on 10/10/2023 at 9:23 AM.

## 2023-10-12 LAB
BKR LAB AP GYN ADEQUACY: NORMAL
BKR LAB AP GYN INTERPRETATION: NORMAL
BKR LAB AP HPV REFLEX: NORMAL
BKR LAB AP LMP: NORMAL
BKR LAB AP PREVIOUS ABNL DX: NORMAL
BKR LAB AP PREVIOUS ABNORMAL: NORMAL
PATH REPORT.COMMENTS IMP SPEC: NORMAL
PATH REPORT.COMMENTS IMP SPEC: NORMAL
PATH REPORT.RELEVANT HX SPEC: NORMAL

## 2023-10-16 ENCOUNTER — PATIENT OUTREACH (OUTPATIENT)
Dept: FAMILY MEDICINE | Facility: CLINIC | Age: 38
End: 2023-10-16
Payer: COMMERCIAL

## 2023-10-16 LAB
HUMAN PAPILLOMA VIRUS 16 DNA: NEGATIVE
HUMAN PAPILLOMA VIRUS 18 DNA: NEGATIVE
HUMAN PAPILLOMA VIRUS FINAL DIAGNOSIS: NORMAL
HUMAN PAPILLOMA VIRUS OTHER HR: NEGATIVE

## 2023-11-06 ENCOUNTER — MYC MEDICAL ADVICE (OUTPATIENT)
Dept: ORTHOPEDICS | Facility: CLINIC | Age: 38
End: 2023-11-06
Payer: COMMERCIAL

## 2023-11-06 DIAGNOSIS — Z96.612 STATUS POST LEFT SHOULDER HEMIARTHROPLASTY: Primary | ICD-10-CM

## 2023-11-07 NOTE — PATIENT INSTRUCTIONS
Triamcinolone apply three times a day for 2 weeks or less as needed   Atopic Dermatitis (Adult)  Atopic dermatitis is a dry, itchy, red rash. It s also called eczema. The rash is chronic, or ongoing. It can come and go over time. The disease is often passed down in families. It causes a problem with the skin barrier that makes the skin more sensitive to the environment and other factors. The increased skin sensitivity causes an itch, which causes scratching. Scratching can worsen the itching or also break the skin. This can put the skin at risk of infection.  The condition is most common in people with asthma, hay fever, hives, or dry or sensitive skin. The rash may be caused by extreme heat or heavy sweating. Skin irritants can cause the rash to flare up. These can include wool or silk clothing, grease, oils, some medicines, and harsh soaps and detergents. Emotional stress can also be a trigger.  Treatment is done to relieve the itching and inflammation of the skin.  Home care  Follow these tips to care for your condition:    Keep the areas of rash clean by bathing at least every other day. Use lukewarm water to bathe. Don t use hot water, which can dry out the skin.    Don t use soaps with strong detergents. Use mild soaps made for sensitive skin.    Apply a cream or ointment to damp skin right after bathing.    Avoid things that irritate your skin. Wear absorbent, soft fabrics next to the skin rather than rough or scratchy materials.    Use mild laundry soap free of scents and perfumes. Make sure to rinse all the soap out of your clothes.    Treat any skin infection as directed.    Use oral diphenhydramine to help reduce itching. This is an antihistamine you can buy at drug and grocery stores. It can make you sleepy, so use lower doses during the daytime. Or you can use loratadine. This is an antihistamine that will not make you sleepy. Do not use diphenhydramine if you have glaucoma or have trouble urinating due  to an enlarged prostate.  Follow-up care  See your healthcare provider, or as advised. If your symptoms don t get better or if they get worse in the next 7 days, make an appointment with your healthcare provider.  When to seek medical advice  Call your healthcare provider right away  if any of these occur:    Increasing area of redness or pain in the skin    Yellow crusts or wet drainage from the rash    Fever of 100.4 F (38 C) or higher, or as directed by your healthcare provider  Date Last Reviewed: 9/1/2016 2000-2017 [employer] [address] [place] [address]. All rights reserved. This information is not intended as a substitute for professional medical care. Always follow your healthcare professional's instructions.         Render In Strict Bullet Format?: No Initiate Treatment: Havs labs checked \\n\\nPending lab results, could Consider starting minoxidil 5% solution or foam 1-2 times daily to any areas of hair thinning, avoid application to face OR could start oral minoxidil (start with 1/2 tablet daily and increase to 1 full tablet after a few weeks if doing well) Detail Level: Zone Initiate Treatment: Treatment options - see handout. Topical or oral minoxidil, spironolactone, finasteride or dutasteride

## 2023-11-09 RX ORDER — AMOXICILLIN 500 MG/1
2000 CAPSULE ORAL ONCE
Qty: 4 CAPSULE | Refills: 0 | Status: SHIPPED | OUTPATIENT
Start: 2023-11-09 | End: 2023-11-09

## 2023-12-25 PROBLEM — M25.511 CHRONIC RIGHT SHOULDER PAIN: Status: RESOLVED | Noted: 2023-05-19 | Resolved: 2023-12-25

## 2023-12-25 PROBLEM — G89.29 CHRONIC RIGHT SHOULDER PAIN: Status: RESOLVED | Noted: 2023-05-19 | Resolved: 2023-12-25

## 2023-12-25 PROBLEM — Z47.89 AFTERCARE FOLLOWING SURGERY OF THE MUSCULOSKELETAL SYSTEM: Status: RESOLVED | Noted: 2021-03-16 | Resolved: 2023-12-25

## 2024-02-26 DIAGNOSIS — I10 HYPERTENSION, UNSPECIFIED TYPE: ICD-10-CM

## 2024-02-26 RX ORDER — LABETALOL 200 MG/1
200 TABLET, FILM COATED ORAL 2 TIMES DAILY
Qty: 180 TABLET | Refills: 1 | OUTPATIENT
Start: 2024-02-26

## 2024-03-29 ENCOUNTER — OFFICE VISIT (OUTPATIENT)
Dept: FAMILY MEDICINE | Facility: CLINIC | Age: 39
End: 2024-03-29
Payer: COMMERCIAL

## 2024-03-29 ENCOUNTER — E-VISIT (OUTPATIENT)
Dept: FAMILY MEDICINE | Facility: CLINIC | Age: 39
End: 2024-03-29
Payer: COMMERCIAL

## 2024-03-29 VITALS
BODY MASS INDEX: 26.29 KG/M2 | DIASTOLIC BLOOD PRESSURE: 80 MMHG | HEIGHT: 66 IN | TEMPERATURE: 97.8 F | SYSTOLIC BLOOD PRESSURE: 128 MMHG | RESPIRATION RATE: 18 BRPM | WEIGHT: 163.6 LBS | OXYGEN SATURATION: 96 % | HEART RATE: 85 BPM

## 2024-03-29 DIAGNOSIS — H61.23 BILATERAL IMPACTED CERUMEN: Primary | ICD-10-CM

## 2024-03-29 DIAGNOSIS — R09.81 NASAL CONGESTION: ICD-10-CM

## 2024-03-29 DIAGNOSIS — H10.32 ACUTE CONJUNCTIVITIS OF LEFT EYE, UNSPECIFIED ACUTE CONJUNCTIVITIS TYPE: ICD-10-CM

## 2024-03-29 DIAGNOSIS — B30.9 VIRAL CONJUNCTIVITIS: Primary | ICD-10-CM

## 2024-03-29 PROCEDURE — 69209 REMOVE IMPACTED EAR WAX UNI: CPT | Mod: 50 | Performed by: FAMILY MEDICINE

## 2024-03-29 PROCEDURE — 99213 OFFICE O/P EST LOW 20 MIN: CPT | Mod: 25 | Performed by: FAMILY MEDICINE

## 2024-03-29 PROCEDURE — 99207 PR NON-BILLABLE SERV PER CHARTING: CPT | Performed by: PHYSICIAN ASSISTANT

## 2024-03-29 RX ORDER — OFLOXACIN 3 MG/ML
1-2 SOLUTION/ DROPS OPHTHALMIC
Qty: 5 ML | Refills: 0 | Status: SHIPPED | OUTPATIENT
Start: 2024-03-29 | End: 2024-04-05

## 2024-03-29 ASSESSMENT — PAIN SCALES - GENERAL: PAINLEVEL: NO PAIN (0)

## 2024-03-29 NOTE — PROGRESS NOTES
Assessment & Plan     Arpita was seen today for left ear clogged.    Diagnoses and all orders for this visit:    Bilateral impacted cerumen  -     GA REMOVAL IMPACTED CERUMEN IRRIGATION/LVG UNILAT  -     Bilateral ear lavage was successfully done by the medical assistant- no immediate complications.       Acute conjunctivitis of left eye, unspecified acute conjunctivitis type  -  Recommend removal of the contact lenses   -  ofloxacin (OCUFLOX) 0.3 % ophthalmic solution; Place 1-2 drops Into the left eye every 2 hours (while awake) for 7 days    Nasal congestion      -Recommended a trial of OTC fluticasone nasal spray 2 sprays per nostril daily for 1 to 2 weeks.     Patient education and Handout with home care instructions given. See AVS for details.      Return in about 1 week (around 4/5/2024), or if symptoms worsen or fail to improve.      Subjective   Arpita is a 39 year old, presenting for the following health issues:  left ear clogged      3/29/2024     3:16 PM   Additional Questions   Roomed by chrissie         3/29/2024     3:16 PM   Patient Reported Additional Medications   Patient reports taking the following new medications chrissie     History of Present Illness       Reason for visit:  Plugged ear  Symptom onset:  3-7 days ago  Symptom intensity:  Moderate  Symptom progression:  Staying the same  Had these symptoms before:  No  What makes it worse:  No  What makes it better:  No    She eats 2-3 servings of fruits and vegetables daily.She consumes 0 sweetened beverage(s) daily.She exercises with enough effort to increase her heart rate 30 to 60 minutes per day.  She exercises with enough effort to increase her heart rate 4 days per week.   She is taking medications regularly.       Patient reports that she recently returned from [employer] in [place].   Noticed ears were clogged the past couple days.  Would like them checked out today.  Denies having any pain or active drainage.  Has nasal  congestion but no fever or chills.    Additional concern-     Eye(s) Problem  Onset: 1  Description:   Location: left  Pain: no  Redness: YES  Accompanying Signs & Symptoms:  Discharge/mattering: no  Swelling: no  Visual changes: no  Fever: no  Nasal Congestion: no  Bothered by bright lights: no  History:   Trauma: no   Foreign body exposure: no  Precipitating factors:   Wearing contacts: YES  Alleviating factors:  Improved by: Unsure    Therapies Tried and outcome: Nothing        Review of Systems  Constitutional, HEENT, cardiovascular, pulmonary, gi and gu systems are negative, except as otherwise noted.      Objective    Pulse 85   Temp 97.8  F (36.6  C) (Tympanic)   Wt 74.2 kg (163 lb 9.6 oz)   LMP 03/20/2024 (Approximate)   SpO2 96%   BMI 26.81 kg/m    Body mass index is 26.81 kg/m .  Physical Exam   GENERAL: alert and no distress  EYES: Eyes grossly normal to inspection, PERRL.  Left conjunctival injection with contact lens noted. Right conjunctivae and sclerae normal  HENT: Bilateral cerumen impaction.  Unable to visualize tympanic membrane Isaura -to ear lavage noted ear canals and TM's normal. Mouth without ulcers or lesions.  Bilateral nasal turbinate hypertrophy left more than the right.   NECK: no adenopathy, no asymmetry, masses, or scars  PSYCH: mentation appears normal, affect normal/bright          Signed Electronically by: Bella Hui MD

## 2024-03-29 NOTE — PATIENT INSTRUCTIONS
Thank you for choosing us for your care. Based on your symptoms and length of illness, I do not think that you need a prescription at this time.  Please follow the care advise I ve provided and use the over the counter medications to help relieve your symptoms. View your full visit summary for details by clicking on the link below.     If you re not feeling better within 2-3 days, please respond to this message and we can consider if a prescription is needed.  You can schedule an appointment right here in [facility], or call [phone]  If the visit is for the same symptoms as your eVisit, we ll refund the cost of your eVisit if seen within seven days.

## 2024-03-30 DIAGNOSIS — I10 HYPERTENSION, UNSPECIFIED TYPE: ICD-10-CM

## 2024-04-01 RX ORDER — LABETALOL 200 MG/1
200 TABLET, FILM COATED ORAL 2 TIMES DAILY
Qty: 180 TABLET | Refills: 2 | Status: SHIPPED | OUTPATIENT
Start: 2024-04-01 | End: 2024-09-17

## 2024-04-11 NOTE — NURSING NOTE
Patient is current with [facility].  Please enter JOHN order upon discharge. RN PT.     Reason For Visit:   Chief Complaint   Patient presents with     Surgical Followup     8 wks s/p right shoulder resurfacing hemiarthroplasty DOS: 5/16/23  - Doing well       PCP: [facility] - [facility]     ?  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  Work status?  Full time.  Date of surgery:S/p Left humeral head resurfacing hemiarthroplasty, sx: 3/9/21   s/p right shoulder resurfacing hemiarthroplasty DOS: 5/16/23   Smoker: No  Request smoking cessation information: No     Right hand dominant    SANE score  Affected shoulder: Right  Right shoulder SANE: 65  Left shoulder SANE:     There were no vitals taken for this visit.    Ifeoma Best, ATC

## 2024-09-17 DIAGNOSIS — I10 HYPERTENSION, UNSPECIFIED TYPE: ICD-10-CM

## 2024-09-17 RX ORDER — LABETALOL 200 MG/1
200 TABLET, FILM COATED ORAL 2 TIMES DAILY
Qty: 180 TABLET | Refills: 0 | Status: SHIPPED | OUTPATIENT
Start: 2024-09-17

## 2024-09-17 RX ORDER — LABETALOL 200 MG/1
TABLET, FILM COATED ORAL
Refills: 0 | OUTPATIENT
Start: 2024-09-17

## 2024-09-23 ENCOUNTER — PATIENT OUTREACH (OUTPATIENT)
Dept: FAMILY MEDICINE | Facility: CLINIC | Age: 39
End: 2024-09-23
Payer: COMMERCIAL

## 2024-09-23 PROBLEM — N87.1 CIN II (CERVICAL INTRAEPITHELIAL NEOPLASIA II): Status: ACTIVE | Noted: 2018-11-06

## 2024-11-30 ENCOUNTER — HEALTH MAINTENANCE LETTER (OUTPATIENT)
Age: 39
End: 2024-11-30

## 2024-12-26 ENCOUNTER — MEDICAL CORRESPONDENCE (OUTPATIENT)
Dept: HEALTH INFORMATION MANAGEMENT | Facility: CLINIC | Age: 39
End: 2024-12-26
Payer: COMMERCIAL

## 2024-12-28 DIAGNOSIS — I10 HYPERTENSION, UNSPECIFIED TYPE: ICD-10-CM

## 2024-12-30 ENCOUNTER — ANCILLARY PROCEDURE (OUTPATIENT)
Dept: MAMMOGRAPHY | Facility: CLINIC | Age: 39
End: 2024-12-30
Payer: COMMERCIAL

## 2024-12-30 ENCOUNTER — ANCILLARY PROCEDURE (OUTPATIENT)
Dept: ULTRASOUND IMAGING | Facility: CLINIC | Age: 39
End: 2024-12-30
Payer: COMMERCIAL

## 2024-12-30 DIAGNOSIS — R22.32 LUMP IN ARMPIT, LEFT: ICD-10-CM

## 2024-12-30 PROCEDURE — G0279 TOMOSYNTHESIS, MAMMO: HCPCS | Performed by: RADIOLOGY

## 2024-12-30 PROCEDURE — 77066 DX MAMMO INCL CAD BI: CPT | Performed by: RADIOLOGY

## 2025-01-01 ENCOUNTER — TRANSFERRED RECORDS (OUTPATIENT)
Dept: MULTI SPECIALTY CLINIC | Facility: CLINIC | Age: 40
End: 2025-01-01

## 2025-01-01 LAB — PAP SMEAR - HIM PATIENT REPORTED: NEGATIVE

## 2025-01-09 RX ORDER — LABETALOL 200 MG/1
200 TABLET, FILM COATED ORAL 2 TIMES DAILY
Qty: 180 TABLET | Refills: 0 | Status: SHIPPED | OUTPATIENT
Start: 2025-01-09

## 2025-01-10 NOTE — TELEPHONE ENCOUNTER
Please call patient and schedule her for a med check as soon as possible at her primary clinic please.

## 2025-01-22 ENCOUNTER — MEDICAL CORRESPONDENCE (OUTPATIENT)
Dept: HEALTH INFORMATION MANAGEMENT | Facility: CLINIC | Age: 40
End: 2025-01-22
Payer: COMMERCIAL

## 2025-02-17 PROBLEM — N87.1 CIN II (CERVICAL INTRAEPITHELIAL NEOPLASIA II): Status: ACTIVE | Noted: 2018-11-06

## 2025-02-26 ENCOUNTER — MEDICAL CORRESPONDENCE (OUTPATIENT)
Dept: HEALTH INFORMATION MANAGEMENT | Facility: CLINIC | Age: 40
End: 2025-02-26
Payer: COMMERCIAL

## 2025-03-05 ENCOUNTER — OFFICE VISIT (OUTPATIENT)
Dept: DERMATOLOGY | Facility: CLINIC | Age: 40
End: 2025-03-05
Payer: COMMERCIAL

## 2025-03-05 DIAGNOSIS — D22.9 BENIGN NEVUS: Primary | ICD-10-CM

## 2025-03-05 DIAGNOSIS — D18.01 CHERRY ANGIOMA: ICD-10-CM

## 2025-03-05 DIAGNOSIS — Z12.83 SKIN CANCER SCREENING: ICD-10-CM

## 2025-03-05 RX ORDER — CICLOPIROX 80 MG/ML
SOLUTION TOPICAL
COMMUNITY
Start: 2025-02-11

## 2025-03-05 NOTE — PROGRESS NOTES
[facility] Dermatology Note  Encounter Date: Mar 5, 2025  Office Visit      Dermatology Problem List:  FBSE: 3/5/25    1. Dysplastic Nevus  - L lower back, mild atypia, S/p Biopsy 1/25/23    Social: Has a daughter  ____________________________________________    Assessment & Plan:  # {Diagnosesderm:740349}.   {TreatmentPlans:266151}   - ***     # {Diagnosesderm:242753}.   {TreatmentPlans:933363}   - ***     # Hx of Dysplastic Nevus  - Sunscreen: Apply 20 minutes prior to going outdoors and reapply every two hours, when wet or sweating. We recommend using an SPF 30 or higher, and to use one that is water resistant.     - Advised to monitor for changing, non-healing, bleeding, painful, changing, or otherwise symptomatic lesions  - Continue annual skin exams    # Benign findings: multiple benign nevi, lentigines, cherry angiomas, SKs  - edu on benign etiology  - Signs and Symptoms of non-melanoma skin cancer and ABCDEs of melanoma reviewed with patient. Patient encouraged to perform monthly self skin exams and educated on how to perform them. UV precautions reviewed with patient. Patient was asked about new or changing moles/lesions on body.   - Sunscreen: Apply 20 minutes prior to going outdoors and reapply every two hours, when wet or sweating. We recommend using an SPF 30 or higher, and to use one that is water resistant.     - RTC for changes     Procedures Performed:   {kkprocedurenotes:795770}     Follow-up: {kkfollowup:411037}    Staff and scribe;     Scribe Disclosure:   I, BIPIN STALLINGS, am serving as a scribe; to document services personally performed by Ignacia Jean Baptiste PA-C -based on data collection and the provider's statements to me.     Provider Disclosure:  I agree with above History, Review of Systems, Physical exam and Plan.  I have reviewed the content of the documentation and have edited it as needed. I have personally performed the services documented here and the documentation  accurately represents those services and the decisions I have made.      Electronically signed by:    All risks, benefits and alternatives were discussed with patient.  Patient is in agreement and understands the assessment and plan.  All questions were answered.    Ignacia Jean Baptiste PA-C, MPAS  [facility]: Phone: [phone], Fax: [phone]  [facility]: Phone: [phone],  Fax: [phone]  [facility]: Phone: [phone], Fax: [phone]  ____________________________________________    CC: Skin Check (Pt is here for a FBSC. No concerns.)      Reviewed patients past medical history and pertinent chart review prior to patient's visit today.     HPI:  Ms. Arpita Zafar is a 40 year old female who presents today as a return patient for FBSC. No personal or family hx of skin cancer. Has a hx of DN.     Today patient did not report any spots of concerns.     Patient is otherwise feeling well, without additional concerns.    Labs:  ***    Physical Exam:  Vitals: There were no vitals taken for this visit.  SKIN: {kkSkinExam:154112}   - ***   - {Skin Exam Derm:368731}.   - {Skin Exam Derm:899865}.   - {Skin Exam Derm:660024}.   - No other lesions of concern on areas examined.     Medications:  Current Outpatient Medications   Medication Sig Dispense Refill    acetaminophen (TYLENOL) 325 MG tablet Take 2 tablets (650 mg) by mouth every 4 hours as needed for mild pain 50 tablet 0    ciclopirox (PENLAC) 8 % external solution APPLY TOPICALLY TO THE AFFECTED AREA DAILY      ibuprofen (ADVIL/MOTRIN) 200 MG tablet Take 200 mg by mouth every 4 hours as needed for pain      labetalol (NORMODYNE) 200 MG tablet TAKE 1 TABLET TWICE A  tablet 0    hydrOXYzine (ATARAX) 25 MG tablet Take 1 tablet (25 mg) by mouth 3 times daily as needed for itching (Patient not taking: Reported on 3/5/2025) 30 tablet 0      No current facility-administered medications for this visit.      Past Medical/Surgical History:   Patient Active Problem List   Diagnosis    Cervical high risk HPV (human papillomavirus) test positive    ESTELA II (cervical intraepithelial neoplasia II)    Avascular necrosis of bone of shoulder (H)    Hypertension, unspecified type     Past Medical History:   Diagnosis Date    Abnormal Pap smear of cervix 10/31/2018    See problem list    Cervical high risk HPV (human papillomavirus) test positive 08/16/2017    See problem list    Preeclampsia complicating hypertension 11/23/2022    Preeclampsia in postpartum period 11/23/2022

## 2025-03-05 NOTE — LETTER
3/5/2025      Arpita Zafar  [address]  [place] [address]      Dear Colleague,    Thank you for referring your patient, Arpita Zafar, to the [facility]. Please see a copy of my visit note below.    [facility] Dermatology Note  Encounter Date: Mar 5, 2025  Office Visit     Reviewed patients past medical history and pertinent chart review prior to patients visit today.     Dermatology Problem List:  Last skin check: 3/5/2025    1. Hx of DN  - Junctional dysplastic nevus with mild atypia, left lower back, 1/25/2023  2. History of onychomycosis, left first toenail, removed by podiatry February 2025, currently applying topical anti-fungal    Social history: Patient has a 2 year old daughter and a 3 month old son.    ____________________________________________    CC: Skin Check (Pt is here for a FBSC. No concerns.)    HPI:  Ms. Arpita Zafar is a(n) 40 year old female who presents today as a return patient for a full body skin cancer screening.  Today, the patient has no specific concerns, including no new or changing cutaneous lesions.  She reports being diligent with photoprotection.    Of note, the patient had her left first toenail removed in February 2025 by podiatry due to onychomycosis.  She is currently applying a topical antifungal solution.    Patient is otherwise feeling well, without additional skin concerns.    Medications:  Current Outpatient Medications   Medication Sig Dispense Refill     acetaminophen (TYLENOL) 325 MG tablet Take 2 tablets (650 mg) by mouth every 4 hours as needed for mild pain 50 tablet 0     ciclopirox (PENLAC) 8 % external solution APPLY TOPICALLY TO THE AFFECTED AREA DAILY       ibuprofen (ADVIL/MOTRIN) 200 MG tablet Take 200 mg by mouth every 4 hours as needed for pain       labetalol (NORMODYNE) 200 MG tablet TAKE 1 TABLET TWICE A  tablet 0     hydrOXYzine (ATARAX) 25 MG tablet Take 1 tablet (25  mg) by mouth 3 times daily as needed for itching (Patient not taking: Reported on 3/5/2025) 30 tablet 0     No current facility-administered medications for this visit.      Past Medical History:   Patient Active Problem List   Diagnosis     Cervical high risk HPV (human papillomavirus) test positive     ESTELA II (cervical intraepithelial neoplasia II)     Avascular necrosis of bone of shoulder (H)     Hypertension, unspecified type     Past Medical History:   Diagnosis Date     Abnormal Pap smear of cervix 10/31/2018    See problem list     Cervical high risk HPV (human papillomavirus) test positive 08/16/2017    See problem list     Preeclampsia complicating hypertension 11/23/2022     Preeclampsia in postpartum period 11/23/2022       _________________________________________     Physical Exam:  Vitals: There were no vitals taken for this visit.   SKIN: Total skin excluding the genitalia areas was performed. The exam included the scalp, face, neck, bilateral arms, chest, back, abdomen, bilateral legs, digits, mons pubis, buttocks, and nails.   - Bettencourt III.  - Multiple tan/brown macules and papules scattered throughout exam, consistent with benign nevi, many of which were examined under dermoscopy with no concerning features found  - Scattered tan, homogenous macules on sun exposed skin, consistent with solar lentigines  - Several 1-2 mm red dome shaped symmetric papules, consistent with cherry angiomas  - Left lower back, well healed scar with no signs of repigmentation or atypical nevus recurrence    - Left first toenail recently removed with 2 mm of new regrowth    - No other lesions of concern on areas examined    _________________________________________    Assessment & Plan:   # Nevi, trunk and extremities  # Solar lentigines  # History of mildly atypical nevus, left lower back, 1/25/2023, no signs of recurrence   - Nevi demonstrate no concerning features on dermoscopy. We discussed the importance of  self exams at home.   - ABCDEs: Counseled ABCDEs of melanoma: Asymmetry, Border (irregularity), Color (not uniform, changes in color), Diameter (greater than 6 mm which is about the size of a pencil eraser), and Evolving (any changes in preexisting moles).  - Sun protection: Counseled SPF 30+ sunscreen, UPF clothing, sun avoidance, tanning bed avoidance.    # Cherry angiomas  - We discussed the benign nature of the skin lesions. No treatment required. Continued observation recommended. Follow up with any concerns.      # History of onychomycosis, left first toenail  The patient had her left first toenail removed in February 2025 by Podiatry due to onychomycosis. She will continue to apply a topical antifungal solution per their recommendations. It appears to be healing well with no signs of infection upon examination today.    Follow-up:  1 year(s) for follow up full body skin exam, prn for new or changing lesions or new concerns    All risks, benefits and alternatives were discussed with patient.  Patient is in agreement and understands the assessment and plan.  All questions were answered.    Ignacia Jean Baptiste PA-C  [facility] Dermatology    CC Referred MD Tam  No address on file on close of this encounter.      Again, thank you for allowing me to participate in the care of your patient.        Sincerely,        Ignacia Jean Baptiste PA-C    Electronically signed

## 2025-03-05 NOTE — PROGRESS NOTES
[facility] Dermatology Note  Encounter Date: Mar 5, 2025  Office Visit     Reviewed patients past medical history and pertinent chart review prior to patients visit today.     Dermatology Problem List:  Last skin check: 3/5/2025    1. Hx of DN  - Junctional dysplastic nevus with mild atypia, left lower back, 1/25/2023  2. History of onychomycosis, left first toenail, removed by podiatry February 2025, currently applying topical anti-fungal    Social history: Patient has a 2 year old daughter and a 3 month old son.    ____________________________________________    CC: Skin Check (Pt is here for a FBSC. No concerns.)    HPI:  Ms. Arpita Zafar is a(n) 40 year old female who presents today as a return patient for a full body skin cancer screening.  Today, the patient has no specific concerns, including no new or changing cutaneous lesions.  She reports being diligent with photoprotection.    Of note, the patient had her left first toenail removed in February 2025 by podiatry due to onychomycosis.  She is currently applying a topical antifungal solution.    Patient is otherwise feeling well, without additional skin concerns.    Medications:  Current Outpatient Medications   Medication Sig Dispense Refill    acetaminophen (TYLENOL) 325 MG tablet Take 2 tablets (650 mg) by mouth every 4 hours as needed for mild pain 50 tablet 0    ciclopirox (PENLAC) 8 % external solution APPLY TOPICALLY TO THE AFFECTED AREA DAILY      ibuprofen (ADVIL/MOTRIN) 200 MG tablet Take 200 mg by mouth every 4 hours as needed for pain      labetalol (NORMODYNE) 200 MG tablet TAKE 1 TABLET TWICE A  tablet 0    hydrOXYzine (ATARAX) 25 MG tablet Take 1 tablet (25 mg) by mouth 3 times daily as needed for itching (Patient not taking: Reported on 3/5/2025) 30 tablet 0     No current facility-administered medications for this visit.      Past Medical History:   Patient Active Problem List   Diagnosis    Cervical high  risk HPV (human papillomavirus) test positive    ESTELA II (cervical intraepithelial neoplasia II)    Avascular necrosis of bone of shoulder (H)    Hypertension, unspecified type     Past Medical History:   Diagnosis Date    Abnormal Pap smear of cervix 10/31/2018    See problem list    Cervical high risk HPV (human papillomavirus) test positive 08/16/2017    See problem list    Preeclampsia complicating hypertension 11/23/2022    Preeclampsia in postpartum period 11/23/2022       _________________________________________     Physical Exam:  Vitals: There were no vitals taken for this visit.   SKIN: Total skin excluding the genitalia areas was performed. The exam included the scalp, face, neck, bilateral arms, chest, back, abdomen, bilateral legs, digits, mons pubis, buttocks, and nails.   - Bettencourt III.  - Multiple tan/brown macules and papules scattered throughout exam, consistent with benign nevi, many of which were examined under dermoscopy with no concerning features found  - Scattered tan, homogenous macules on sun exposed skin, consistent with solar lentigines  - Several 1-2 mm red dome shaped symmetric papules, consistent with cherry angiomas  - Left lower back, well healed scar with no signs of repigmentation or atypical nevus recurrence    - Left first toenail recently removed with 2 mm of new regrowth    - No other lesions of concern on areas examined    _________________________________________    Assessment & Plan:   # Nevi, trunk and extremities  # Solar lentigines  # History of mildly atypical nevus, left lower back, 1/25/2023, no signs of recurrence   - Nevi demonstrate no concerning features on dermoscopy. We discussed the importance of self exams at home.   - ABCDEs: Counseled ABCDEs of melanoma: Asymmetry, Border (irregularity), Color (not uniform, changes in color), Diameter (greater than 6 mm which is about the size of a pencil eraser), and Evolving (any changes in preexisting moles).  - Sun  protection: Counseled SPF 30+ sunscreen, UPF clothing, sun avoidance, tanning bed avoidance.    # Cherry angiomas  - We discussed the benign nature of the skin lesions. No treatment required. Continued observation recommended. Follow up with any concerns.      # History of onychomycosis, left first toenail  The patient had her left first toenail removed in February 2025 by Podiatry due to onychomycosis. She will continue to apply a topical antifungal solution per their recommendations. It appears to be healing well with no signs of infection upon examination today.    Follow-up:  1 year(s) for follow up full body skin exam, prn for new or changing lesions or new concerns    All risks, benefits and alternatives were discussed with patient.  Patient is in agreement and understands the assessment and plan.  All questions were answered.    Ignacia Jean Baptiste PA-C  [facility] Dermatology    CC Referred MD Tam  No address on file on close of this encounter.

## 2025-03-05 NOTE — NURSING NOTE
Arpita Zafar's goals for this visit include:   Chief Complaint   Patient presents with    Skin Check     Pt is here for a FBSC. No concerns.       She requests these members of her care team be copied on today's visit information:     PCP: [facility]    Referring Provider:  Referred Self, MD  No address on file    There were no vitals taken for this visit.    Do you need any medication refills at today's visit?     Asia Dodson LPN on 3/5/2025 at 2:33 PM

## 2025-04-15 ENCOUNTER — OFFICE VISIT (OUTPATIENT)
Dept: FAMILY MEDICINE | Facility: CLINIC | Age: 40
End: 2025-04-15
Payer: COMMERCIAL

## 2025-04-15 VITALS
WEIGHT: 163 LBS | DIASTOLIC BLOOD PRESSURE: 76 MMHG | RESPIRATION RATE: 16 BRPM | BODY MASS INDEX: 26.2 KG/M2 | OXYGEN SATURATION: 98 % | HEIGHT: 66 IN | HEART RATE: 71 BPM | SYSTOLIC BLOOD PRESSURE: 112 MMHG | TEMPERATURE: 96.8 F

## 2025-04-15 DIAGNOSIS — Z12.4 CERVICAL CANCER SCREENING: ICD-10-CM

## 2025-04-15 DIAGNOSIS — Z13.29 SCREENING FOR THYROID DISORDER: ICD-10-CM

## 2025-04-15 DIAGNOSIS — Z80.0 FAMILY HISTORY OF COLON CANCER: ICD-10-CM

## 2025-04-15 DIAGNOSIS — Z13.1 SCREENING FOR DIABETES MELLITUS: ICD-10-CM

## 2025-04-15 DIAGNOSIS — L65.9 HAIR THINNING: ICD-10-CM

## 2025-04-15 DIAGNOSIS — I10 BENIGN ESSENTIAL HYPERTENSION: ICD-10-CM

## 2025-04-15 DIAGNOSIS — Z00.00 ROUTINE GENERAL MEDICAL EXAMINATION AT A HEALTH CARE FACILITY: Primary | ICD-10-CM

## 2025-04-15 DIAGNOSIS — Z12.11 SPECIAL SCREENING FOR MALIGNANT NEOPLASMS, COLON: ICD-10-CM

## 2025-04-15 DIAGNOSIS — Z13.220 SCREENING CHOLESTEROL LEVEL: ICD-10-CM

## 2025-04-15 LAB
ANION GAP SERPL CALCULATED.3IONS-SCNC: 10 MMOL/L (ref 7–15)
BUN SERPL-MCNC: 9.4 MG/DL (ref 6–20)
CALCIUM SERPL-MCNC: 9.3 MG/DL (ref 8.8–10.4)
CHLORIDE SERPL-SCNC: 104 MMOL/L (ref 98–107)
CHOLEST SERPL-MCNC: 186 MG/DL
CREAT SERPL-MCNC: 0.73 MG/DL (ref 0.51–0.95)
EGFRCR SERPLBLD CKD-EPI 2021: >90 ML/MIN/1.73M2
FASTING STATUS PATIENT QL REPORTED: NO
FASTING STATUS PATIENT QL REPORTED: NO
GLUCOSE SERPL-MCNC: 98 MG/DL (ref 70–99)
HCO3 SERPL-SCNC: 26 MMOL/L (ref 22–29)
HDLC SERPL-MCNC: 65 MG/DL
LDLC SERPL CALC-MCNC: 111 MG/DL
NONHDLC SERPL-MCNC: 121 MG/DL
POTASSIUM SERPL-SCNC: 4.2 MMOL/L (ref 3.4–5.3)
SODIUM SERPL-SCNC: 140 MMOL/L (ref 135–145)
TRIGL SERPL-MCNC: 51 MG/DL
TSH SERPL DL<=0.005 MIU/L-ACNC: 1.93 UIU/ML (ref 0.3–4.2)

## 2025-04-15 PROCEDURE — 84443 ASSAY THYROID STIM HORMONE: CPT | Performed by: NURSE PRACTITIONER

## 2025-04-15 PROCEDURE — 99395 PREV VISIT EST AGE 18-39: CPT | Performed by: NURSE PRACTITIONER

## 2025-04-15 PROCEDURE — 99214 OFFICE O/P EST MOD 30 MIN: CPT | Mod: 25 | Performed by: NURSE PRACTITIONER

## 2025-04-15 PROCEDURE — 86376 MICROSOMAL ANTIBODY EACH: CPT | Performed by: NURSE PRACTITIONER

## 2025-04-15 PROCEDURE — 36415 COLL VENOUS BLD VENIPUNCTURE: CPT | Performed by: NURSE PRACTITIONER

## 2025-04-15 PROCEDURE — 80048 BASIC METABOLIC PNL TOTAL CA: CPT | Performed by: NURSE PRACTITIONER

## 2025-04-15 PROCEDURE — 3074F SYST BP LT 130 MM HG: CPT | Performed by: NURSE PRACTITIONER

## 2025-04-15 PROCEDURE — 3078F DIAST BP <80 MM HG: CPT | Performed by: NURSE PRACTITIONER

## 2025-04-15 PROCEDURE — 80061 LIPID PANEL: CPT | Performed by: NURSE PRACTITIONER

## 2025-04-15 RX ORDER — LABETALOL 200 MG/1
200 TABLET, FILM COATED ORAL 2 TIMES DAILY
Qty: 180 TABLET | Refills: 3 | Status: SHIPPED | OUTPATIENT
Start: 2025-04-15

## 2025-04-15 SDOH — HEALTH STABILITY: PHYSICAL HEALTH: ON AVERAGE, HOW MANY DAYS PER WEEK DO YOU ENGAGE IN MODERATE TO STRENUOUS EXERCISE (LIKE A BRISK WALK)?: 1 DAY

## 2025-04-15 SDOH — HEALTH STABILITY: PHYSICAL HEALTH: ON AVERAGE, HOW MANY MINUTES DO YOU ENGAGE IN EXERCISE AT THIS LEVEL?: 30 MIN

## 2025-04-15 ASSESSMENT — SOCIAL DETERMINANTS OF HEALTH (SDOH): HOW OFTEN DO YOU GET TOGETHER WITH FRIENDS OR RELATIVES?: MORE THAN THREE TIMES A WEEK

## 2025-04-15 NOTE — PROGRESS NOTES
Preventive Care Visit  [facility] MACK LYON, Family Medicine  Apr 15, 2025      Assessment & Plan     Routine general medical examination at a health care facility  2 kids at daughter age 2-1/2 son age 4 months.  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  Mental health stable.  Engaged     Benign essential hypertension  Blood pressure has been well-controlled with labetalol denies chest pain, dizziness, frequent headaches.  Needing refills.  In agreement to labs  - BASIC METABOLIC PANEL; Future  - BASIC METABOLIC PANEL    Cervical cancer screening  Had Pap done in January    Hair thinning  Since having children has had hair thinning, no patchy hair loss.  Discussed hormone changes will screen thyroid  - TSH with free T4 reflex; Future  - Thyroid peroxidase antibody; Future  - TSH with free T4 reflex  - Thyroid peroxidase antibody    Screening for thyroid disorder  In agreement to screening labs  - TSH with free T4 reflex; Future  - Thyroid peroxidase antibody; Future  - TSH with free T4 reflex  - Thyroid peroxidase antibody    Screening cholesterol level  In agreement to screening labs  - Lipid panel reflex to direct LDL Fasting; Future  - Lipid panel reflex to direct LDL Fasting    Screening for diabetes mellitus  In agreement to screening labs  - BASIC METABOLIC PANEL; Future  - BASIC METABOLIC PANEL    Special screening for malignant neoplasms, colon  Family history of colon cancer wondering when should have colonoscopy.  No change in bowel habits.  Always feels like she  never completely empties.  Discussed verifying coverage with insurance prior to scheduling   - Colonoscopy Screening  Referral; Future    Family history of colon cancer  Family history of colon cancer wondering when should have colonoscopy.  No change in bowel habits.  Always feels like she  never completely empties.  Discussed verifying coverage with insurance prior to scheduling   - Colonoscopy  "Screening  Referral; Future    Patient has been advised of split billing requirements and indicates understanding: Yes        BMI  Estimated body mass index is 26.71 kg/m  as calculated from the following:    Height as of this encounter: 1.664 m (5' 5.5\").    Weight as of this encounter: 73.9 kg (163 lb).   Weight management plan: Discussed healthy diet and exercise guidelines    Counseling  Appropriate preventive services were addressed with this patient via screening, questionnaire, or discussion as appropriate for fall prevention, nutrition, physical activity, Tobacco-use cessation, social engagement, weight loss and cognition.  Checklist reviewing preventive services available has been given to the patient.  Reviewed patient's diet, addressing concerns and/or questions.   She is at risk for lack of exercise and has been provided with information to increase physical activity for the benefit of her well-being.       Regular exercise  See Patient Instructions    Subjective   Arpita is a 40 year old, presenting for the following:  Physical (112/76, blood pressure concerns, pap smear data abstracted from OGI for date 2025)        4/15/2025     7:50 AM   Additional Questions   Roomed by Bailey RM         4/15/2025     7:50 AM   Patient Reported Additional Medications   Patient reports taking the following new medications pre           HPI        4/15/2025   General Health   How would you rate your overall physical health? Good   Feel stress (tense, anxious, or unable to sleep) Not at all         4/15/2025   Nutrition   Three or more servings of calcium each day? Yes   Diet: Regular (no restrictions)   How many servings of fruit and vegetables per day? (!) 2-3   How many sweetened beverages each day? 0-1         4/15/2025   Exercise   Days per week of moderate/strenous exercise 1 day   Average minutes spent exercising at this level 30 min   (!) EXERCISE CONCERN      4/15/2025   Social Factors "   Frequency of gathering with friends or relatives More than three times a week   Worry food won't last until get money to buy more No   Food not last or not have enough money for food? No   Do you have housing? (Housing is defined as stable permanent housing and does not include staying ouside in a car, in a tent, in an abandoned building, in an overnight shelter, or couch-surfing.) Yes   Are you worried about losing your housing? No   Lack of transportation? No   Unable to get utilities (heat,electricity)? No         4/15/2025   Dental   Dentist two times every year? Yes             Today's PHQ-2 Score:       3/5/2025     2:31 PM   PHQ-2 (  [employer])   Q1: Little interest or pleasure in doing things 0   Q2: Feeling down, depressed or hopeless 0   PHQ-2 Score 0         4/15/2025   Substance Use   Alcohol more than 3/day or more than 7/wk No   Do you use any other substances recreationally? No     Social History     Tobacco Use    Smoking status: Former     Current packs/day: 0.00     Types: Cigarettes     Quit date: 10/31/2015     Years since quittin.4     Passive exposure: Past    Smokeless tobacco: Never   Vaping Use    Vaping status: Never Used   Substance Use Topics    Alcohol use: Yes     Comment: occasionally wine    Drug use: No           2024   LAST FHS-7 RESULTS   1st degree relative breast or ovarian cancer No    No   Any relative bilateral breast cancer No    No   Any male have breast cancer No    No   Any ONE woman have BOTH breast AND ovarian cancer No    No   Any woman with breast cancer before 50yrs No    No   2 or more relatives with breast AND/OR ovarian cancer No    No   2 or more relatives with breast AND/OR bowel cancer No    No       Multiple values from one day are sorted in reverse-chronological order        Mammogram Screening - Patient under 40 years of age: Routine Mammogram Screening not recommended.         4/15/2025   STI Screening   New sexual partner(s) since last STI/HIV  test? No     History of abnormal Pap smear: No - age 30-64 HPV with reflex Pap every 5 years recommended        Latest Ref Rng & Units 10/10/2023     9:02 AM 2021    12:00 AM 3/5/2020     5:27 PM   PAP / HPV   PAP  Negative for Intraepithelial Lesion or Malignancy (NILM)      PAP (Historical)    NIL    HPV 16 DNA Negative Negative      HPV 18 DNA Negative Negative      Other HR HPV Negative Negative      PAP-ABSTRACT   See Scanned Document            This result is from an external source.           4/15/2025   Contraception/Family Planning   Questions about contraception or family planning No        Reviewed and updated as needed this visit by Provider                    Past Medical History:   Diagnosis Date    Abnormal Pap smear of cervix 10/31/2018    See problem list    Cervical high risk HPV (human papillomavirus) test positive 2017    See problem list    Preeclampsia complicating hypertension 2022    Preeclampsia in postpartum period 2022     Past Surgical History:   Procedure Laterality Date    ARTHROPLASTY SHOULDER Left 3/9/2021    Procedure: Left Shoulder Resurfacing Hemiarthroplasty;  Surgeon: Steffanie Stockton MD;  Location: [facility] OR    ARTHROPLASTY SHOULDER Right 2023    Procedure: RESURFACING ARTHROPLASTY, RIGHT SHOULDER;  Surgeon: Steffanie Stockton MD;  Location: [facility] OR    CONIZATION LEEP N/A 1/15/2019    Procedure: LOOP ELECTROSURGICAL EXCISION PROCEDURE WITH CONIZATION OF CERVIX;  Surgeon: Corey Blakely MD;  Location: [facility] OR    HC COLP CERVIX/UPPER VAGINA W ENDOCERV CURETT       BREAST BIOPSY CORE NEEDLE LEFT       OB History    Para Term  AB Living   3 0 0 0 2 0   SAB IAB Ectopic Multiple Live Births   2 0 0 0 0      # Outcome Date GA Lbr Dennis/2nd Weight Sex Type Anes PTL Lv   3 SAB      SAB      2             1 SAB              Lab work is in process  Labs reviewed in EPIC  BP Readings from Last 3 Encounters:   04/15/25  112/76   24 128/80   10/10/23 138/86    Wt Readings from Last 3 Encounters:   04/15/25 73.9 kg (163 lb)   24 74.2 kg (163 lb 9.6 oz)   10/10/23 73 kg (161 lb)                  Patient Active Problem List   Diagnosis    Cervical high risk HPV (human papillomavirus) test positive    ESTELA II (cervical intraepithelial neoplasia II)    Avascular necrosis of bone of shoulder (H)    Hypertension, unspecified type    Family history of colon cancer     Past Surgical History:   Procedure Laterality Date    ARTHROPLASTY SHOULDER Left 3/9/2021    Procedure: Left Shoulder Resurfacing Hemiarthroplasty;  Surgeon: Steffanie Stockton MD;  Location: [facility] OR    ARTHROPLASTY SHOULDER Right 2023    Procedure: RESURFACING ARTHROPLASTY, RIGHT SHOULDER;  Surgeon: Steffanie Stockton MD;  Location: [facility] OR    CONIZATION LEEP N/A 1/15/2019    Procedure: LOOP ELECTROSURGICAL EXCISION PROCEDURE WITH CONIZATION OF CERVIX;  Surgeon: Corey Blakely MD;  Location: [facility] OR    HC COLP CERVIX/UPPER VAGINA W ENDOCERV CURETT  2018     BREAST BIOPSY CORE NEEDLE LEFT         Social History     Tobacco Use    Smoking status: Former     Current packs/day: 0.00     Types: Cigarettes     Quit date: 10/31/2015     Years since quittin.4     Passive exposure: Past    Smokeless tobacco: Never   Substance Use Topics    Alcohol use: Yes     Comment: occasionally wine     Family History   Problem Relation Age of Onset    No Known Problems Mother     Diabetes Father     Hyperlipidemia Father     No Known Problems Sister     No Known Problems Brother     Colon Cancer Maternal Grandmother 62    Chronic Obstructive Pulmonary Disease Maternal Grandfather     Leukemia Paternal Grandmother     Multiple Sclerosis Paternal Grandfather     Malignant Hyperthermia No family hx of     Anesthesia Reaction No family hx of     Deep Vein Thrombosis (DVT) No family hx of          Current Outpatient Medications   Medication Sig Dispense Refill     "acetaminophen (TYLENOL) 325 MG tablet Take 2 tablets (650 mg) by mouth every 4 hours as needed for mild pain 50 tablet 0    ciclopirox (PENLAC) 8 % external solution APPLY TOPICALLY TO THE AFFECTED AREA DAILY      ibuprofen (ADVIL/MOTRIN) 200 MG tablet Take 200 mg by mouth every 4 hours as needed for pain      labetalol (NORMODYNE) 200 MG tablet Take 1 tablet (200 mg) by mouth 2 times daily. 180 tablet 3     Allergies   Allergen Reactions    Nifedipine Rash     Recent Labs   Lab Test 10/10/23  0938 05/02/23  0924 03/05/21  0951 01/22/21  0959 01/08/21  1317 08/16/17  0953   LDL 85  --   --   --   --  67   HDL 69  --   --   --   --   --    TRIG 99  --   --   --   --   --    ALT  --   --   --   --   --  22   CR 0.70 0.75 0.78 0.71   < > 0.83   GFRESTIMATED >90 >90 >90 >90   < > 80   GFRESTBLACK  --   --  >90 >90   < > >90   POTASSIUM 4.3 3.9 4.1 4.2   < > 3.9   TSH  --   --   --   --   --  1.11    < > = values in this interval not displayed.               Review of Systems  CONSTITUTIONAL: NEGATIVE for fever, chills, change in weight  INTEGUMENTARY/SKIN: NEGATIVE for worrisome rashes, moles or lesions  EYES: NEGATIVE for vision changes or irritation  ENT/MOUTH: NEGATIVE for ear, mouth and throat problems  RESP: NEGATIVE for significant cough or SOB  BREAST: NEGATIVE for masses, tenderness or discharge  CV: NEGATIVE for chest pain, palpitations or peripheral edema  GI: NEGATIVE for nausea, abdominal pain, heartburn, or change in bowel habits  : NEGATIVE for frequency, dysuria, or hematuria  MUSCULOSKELETAL: NEGATIVE for significant arthralgias or myalgia  NEURO: NEGATIVE for weakness, dizziness or paresthesias  ENDOCRINE: NEGATIVE for temperature intolerance, skin/hair changes  HEME: NEGATIVE for bleeding problems  PSYCHIATRIC: NEGATIVE for changes in mood or affect     Objective    Exam  /76   Pulse 71   Temp 96.8  F (36  C) (Temporal)   Resp 16   Ht 1.664 m (5' 5.5\")   Wt 73.9 kg (163 lb)   SpO2 98%   " "BMI 26.71 kg/m     Estimated body mass index is 26.71 kg/m  as calculated from the following:    Height as of this encounter: 1.664 m (5' 5.5\").    Weight as of this encounter: 73.9 kg (163 lb).    Physical Exam  GENERAL: alert and no distress  EYES: Eyes grossly normal to inspection, PERRL and conjunctivae and sclerae normal  HENT: ear canals and TM's normal, nose and mouth without ulcers or lesions  NECK: no adenopathy, no asymmetry, masses, or scars  RESP: lungs clear to auscultation - no rales, rhonchi or wheezes  BREAST: deferred per guidelines, asymptomatic per pt, discussed SBE, symptoms to watch out for and when to follow up, discussed mammogram; currently breastfeeding/pumping  CV: regular rate and rhythm, normal S1 S2, no S3 or S4, no murmur, click or rub, no peripheral edema  ABDOMEN: soft, nontender, no hepatosplenomegaly, no masses and bowel sounds normal   (female): deferred per pt, no concerns  MS: no gross musculoskeletal defects noted, no edema, no CVA tenderness   SKIN: no suspicious lesions or rashes  NEURO: Normal strength and tone, cranial nerves intact, mentation intact and speech normal  PSYCH: mentation appears normal, affect normal/bright  LYMPH: no cervical, supraclavicular adenopathy    See orders  Declining vaccines    Signed Electronically by: MACK RODRIGUEZ    "

## 2025-04-15 NOTE — PATIENT INSTRUCTIONS
Patient Education   Preventive Care Advice   This is general advice given by our system to help you stay healthy. However, your care team may have specific advice just for you. Please talk to your care team about your preventive care needs.  Nutrition  Eat 5 or more servings of fruits and vegetables each day.  Try wheat bread, brown rice and whole grain pasta (instead of white bread, rice, and pasta).  Get enough calcium and vitamin D. Check the label on foods and aim for 100% of the RDA (recommended daily allowance).  Lifestyle  Exercise at least 150 minutes each week  (30 minutes a day, 5 days a week).  Do muscle strengthening activities 2 days a week. These help control your weight and prevent disease.  No smoking.  Wear sunscreen to prevent skin cancer.  Have a dental exam and cleaning every 6 months.  Yearly exams  See your health care team every year to talk about:  Any changes in your health.  Any medicines your care team has prescribed.  Preventive care, family planning, and ways to prevent chronic diseases.  Shots (vaccines)   HPV shots (up to age 26), if you've never had them before.  Hepatitis B shots (up to age 59), if you've never had them before.  COVID-19 shot: Get this shot when it's due.  Flu shot: Get a flu shot every year.  Tetanus shot: Get a tetanus shot every 10 years.  Pneumococcal, hepatitis A, and RSV shots: Ask your care team if you need these based on your risk.  Shingles shot (for age 50 and up)  General health tests  Diabetes screening:  Starting at age 35, Get screened for diabetes at least every 3 years.  If you are younger than age 35, ask your care team if you should be screened for diabetes.  Cholesterol test: At age 39, start having a cholesterol test every 5 years, or more often if advised.  Bone density scan (DEXA): At age 50, ask your care team if you should have this scan for osteoporosis (brittle bones).  Hepatitis C: Get tested at least once in your life.  STIs (sexually  transmitted infections)  Before age 24: Ask your care team if you should be screened for STIs.  After age 24: Get screened for STIs if you're at risk. You are at risk for STIs (including HIV) if:  You are sexually active with more than one person.  You don't use condoms every time.  You or a partner was diagnosed with a sexually transmitted infection.  If you are at risk for HIV, ask about PrEP medicine to prevent HIV.  Get tested for HIV at least once in your life, whether you are at risk for HIV or not.  Cancer screening tests  Cervical cancer screening: If you have a cervix, begin getting regular cervical cancer screening tests starting at age 21.  Breast cancer scan (mammogram): If you've ever had breasts, begin having regular mammograms starting at age 40. This is a scan to check for breast cancer.  Colon cancer screening: It is important to start screening for colon cancer at age 45.  Have a colonoscopy test every 10 years (or more often if you're at risk) Or, ask your provider about stool tests like a FIT test every year or Cologuard test every 3 years.  To learn more about your testing options, visit:   .  For help making a decision, visit:   https://bit.ly/rk32480.  Prostate cancer screening test: If you have a prostate, ask your care team if a prostate cancer screening test (PSA) at age 55 is right for you.  Lung cancer screening: If you are a current or former smoker ages 50 to 80, ask your care team if ongoing lung cancer screenings are right for you.  For informational purposes only. Not to replace the advice of your health care provider. Copyright   2023 [employer]. All rights reserved. Clinically reviewed by the [facility] Transitions Program. [employer] 378130 - REV 01/24.

## 2025-04-16 LAB — THYROPEROXIDASE AB SERPL-ACNC: <10 IU/ML

## 2025-04-23 ENCOUNTER — TELEPHONE (OUTPATIENT)
Dept: GASTROENTEROLOGY | Facility: CLINIC | Age: 40
End: 2025-04-23
Payer: COMMERCIAL

## 2025-04-23 DIAGNOSIS — Z80.0 FAMILY HISTORY OF COLON CANCER: Primary | ICD-10-CM

## 2025-04-23 RX ORDER — BISACODYL 5 MG/1
TABLET, DELAYED RELEASE ORAL
Qty: 4 TABLET | Refills: 0 | Status: SHIPPED | OUTPATIENT
Start: 2025-04-23

## 2025-04-23 NOTE — TELEPHONE ENCOUNTER
Pre visit planning completed.      Procedure details:    Patient scheduled for Colonoscopy on 5/9/25.     Approximate arrival time: 1300. Procedure time 1345.   *Ensure patient is aware that endoscopy team will be calling about 2 days prior to procedure date to confirm arrival time as this may change.     Facility location: [facility]; [address], [place] [address]. Check in location: 2nd Floor at Surgery desk.  *Disclaimer: Drivers are to check in with patient and stay on campus during procedure.     Sedation type: Conscious sedation     Pre op exam needed? No.    Indication for procedure:   Special screening for malignant neoplasms, colon   Family history of colon cancer         Chart review:     Electronic implanted devices? No    Recent diagnosis of diverticulitis within the last 6 weeks? No      Medication review:    Diabetic? No    Anticoagulants? No    Weight loss medication/injectable? No GLP-1 medication per patient's medication list. Nursing to verify with pre-assessment call.    Other medication HOLDING recommendations:  N/A      Prep for procedure:     Bowel prep recommendation: Standard Miralax.   Due to: standard bowel prep    Procedure information and instructions sent via Treatokellie Perez RN  Endoscopy Procedure Pre Assessment   [phone] option 3

## 2025-04-23 NOTE — TELEPHONE ENCOUNTER
Pre assessment completed for upcoming procedure.   (Please see previous telephone encounter notes for complete details)    I called and spoke with patient       Procedure details:    Approximate time and facility location reviewed.   Patient is aware that endoscopy team will be calling about 2 days prior to confirm arrival time.    Designated  policy reviewed and that site requests drivers to check in and stay on campus. Instructed to have someone stay 6  hours post procedure.   *Disclaimer - please notify the [facility] GI staff with any  issues/concerns.    Medication review:    Medications reviewed. Please see supporting documentation below. Holding recommendations discussed (if applicable).       Prep for procedure:     Procedure prep instructions reviewed.        Any additional information needed:  Patient has concerns with constipation issues in the past and worried about not being cleaned out enough for her procedure. Patient was instructed to do the extended golytely prep. Updated instructions were sent to her [employer] account and the Rx has been sent to her requested pharmacy.       Patient verbalized understanding and had no questions or concerns at this time.      Michelle Souza LPN  Endoscopy Procedure Pre Assessment   [phone] option 3

## 2025-05-07 ENCOUNTER — TELEPHONE (OUTPATIENT)
Dept: GASTROENTEROLOGY | Facility: CLINIC | Age: 40
End: 2025-05-07
Payer: COMMERCIAL

## 2025-05-07 RX ORDER — NALOXONE HYDROCHLORIDE 0.4 MG/ML
0.2 INJECTION, SOLUTION INTRAMUSCULAR; INTRAVENOUS; SUBCUTANEOUS
Status: CANCELLED | OUTPATIENT
Start: 2025-05-07

## 2025-05-07 RX ORDER — PROCHLORPERAZINE MALEATE 10 MG
10 TABLET ORAL EVERY 6 HOURS PRN
Status: CANCELLED | OUTPATIENT
Start: 2025-05-07

## 2025-05-07 RX ORDER — ONDANSETRON 4 MG/1
4 TABLET, ORALLY DISINTEGRATING ORAL EVERY 6 HOURS PRN
Status: CANCELLED | OUTPATIENT
Start: 2025-05-07

## 2025-05-07 RX ORDER — NALOXONE HYDROCHLORIDE 0.4 MG/ML
0.4 INJECTION, SOLUTION INTRAMUSCULAR; INTRAVENOUS; SUBCUTANEOUS
Status: CANCELLED | OUTPATIENT
Start: 2025-05-07

## 2025-05-07 RX ORDER — ONDANSETRON 2 MG/ML
4 INJECTION INTRAMUSCULAR; INTRAVENOUS EVERY 6 HOURS PRN
Status: CANCELLED | OUTPATIENT
Start: 2025-05-07

## 2025-05-07 RX ORDER — FLUMAZENIL 0.1 MG/ML
0.2 INJECTION, SOLUTION INTRAVENOUS
Status: CANCELLED | OUTPATIENT
Start: 2025-05-07 | End: 2025-05-08

## 2025-05-07 NOTE — TELEPHONE ENCOUNTER
Left voicemail of arrival time of 1:00 PM.     2080 Med[employer] message sent with updated arrival time.

## 2025-05-09 ENCOUNTER — HOSPITAL ENCOUNTER (OUTPATIENT)
Facility: AMBULATORY SURGERY CENTER | Age: 40
Discharge: HOME | End: 2025-05-09
Attending: COLON & RECTAL SURGERY | Admitting: COLON & RECTAL SURGERY
Payer: COMMERCIAL

## 2025-05-09 VITALS
HEART RATE: 68 BPM | RESPIRATION RATE: 16 BRPM | OXYGEN SATURATION: 99 % | TEMPERATURE: 97.8 F | SYSTOLIC BLOOD PRESSURE: 141 MMHG | DIASTOLIC BLOOD PRESSURE: 102 MMHG

## 2025-05-09 RX ORDER — ONDANSETRON 2 MG/ML
4 INJECTION INTRAMUSCULAR; INTRAVENOUS
Status: DISCONTINUED | OUTPATIENT
Start: 2025-05-09 | End: 2025-05-14 | Stop reason: HOSPADM

## 2025-05-09 RX ORDER — LIDOCAINE 40 MG/G
CREAM TOPICAL
Status: DISCONTINUED | OUTPATIENT
Start: 2025-05-09 | End: 2025-05-14 | Stop reason: HOSPADM

## 2025-05-09 NOTE — PROGRESS NOTES
Patient arrived for colonoscopy, reporting this was for screening due to family history of colon cancer in a grandparent. Discussed that cancer in a second-degree relative does not confer increased risk so she would not meet screening guidelines, so colonoscopy would likely not be covered by insurance. She also reports constipation; discussed that colonoscopy can be part of the workup for constipation but this would be a diagnostic colonoscopy and then often not covered by insurance. After this information was relayed, patient decided to cancel colonoscopy. Recommend starting screening colonoscopy at age 46.    Jeanne Carlos MD  [facility]  [phone] (office)

## 2025-05-12 ENCOUNTER — OFFICE VISIT (OUTPATIENT)
Dept: FAMILY MEDICINE | Facility: CLINIC | Age: 40
End: 2025-05-12
Payer: COMMERCIAL

## 2025-05-12 VITALS
DIASTOLIC BLOOD PRESSURE: 82 MMHG | WEIGHT: 163.2 LBS | SYSTOLIC BLOOD PRESSURE: 135 MMHG | RESPIRATION RATE: 14 BRPM | BODY MASS INDEX: 26.23 KG/M2 | TEMPERATURE: 97 F | OXYGEN SATURATION: 97 % | HEART RATE: 72 BPM | HEIGHT: 66 IN

## 2025-05-12 DIAGNOSIS — G56.02 CARPAL TUNNEL SYNDROME OF LEFT WRIST: Primary | ICD-10-CM

## 2025-05-12 PROCEDURE — 3075F SYST BP GE 130 - 139MM HG: CPT | Performed by: NURSE PRACTITIONER

## 2025-05-12 PROCEDURE — 1126F AMNT PAIN NOTED NONE PRSNT: CPT | Performed by: NURSE PRACTITIONER

## 2025-05-12 PROCEDURE — 3079F DIAST BP 80-89 MM HG: CPT | Performed by: NURSE PRACTITIONER

## 2025-05-12 PROCEDURE — 99213 OFFICE O/P EST LOW 20 MIN: CPT | Performed by: NURSE PRACTITIONER

## 2025-05-12 ASSESSMENT — PAIN SCALES - GENERAL: PAINLEVEL_OUTOF10: NO PAIN (0)

## 2025-05-12 NOTE — PROGRESS NOTES
"  Assessment & Plan     Carpal tunnel syndrome of left wrist  -symptoms and exam consistent with carpal tunnel syndrome. Discussed pathophysiology and management, recommend wrist brace at night and with offending activities, can wrap elbow in a towel to keep straight, NSAIDs as needed to decrease inflammation. If worsening pain can order EMG to confirm diagnosis and refer to hand oT.                 Subjective   Arpita is a 40 year old, presenting for the following health issues:  Musculoskeletal Problem        5/12/2025     2:11 PM   Additional Questions   Roomed by Maria Dolores XIE   Accompanied by self     One week ago started with numbness in fingers, now pins and needles going up left arm. Was having left shoulder discomfort that has improved.  No neck pain. No known injury/trauma. Has 5 month old, does carry baby on her left but has been trying to carry more on the right.     Had numbness in index and middle finger right after labor and delivery.    Works as hospice case manager. No sports/other hobbies.     History of partial replacement of both shoulders.             Musculoskeletal Problem    History of Present Illness       Reason for visit:  Left arm hand fingers pins and needles  Symptom onset:  3-7 days ago  Symptoms include:  Left arm/hand pins and needles feeling  Symptom intensity:  Moderate  Symptom progression:  Worsening  Had these symptoms before:  No  What makes it worse:  Changing positions of my arm  What makes it better:  No   She is taking medications regularly.                      Objective    /82   Pulse 72   Temp 97  F (36.1  C) (Tympanic)   Resp 14   Ht 1.664 m (5' 5.5\")   Wt 74 kg (163 lb 3.2 oz)   LMP 05/09/2025 (Exact Date)   SpO2 97%   Breastfeeding Yes   BMI 26.74 kg/m    Body mass index is 26.74 kg/m .  Physical Exam  Constitutional:       Appearance: Normal appearance.   HENT:      Right Ear: External ear normal.      Left Ear: External ear normal.   Eyes:      Pupils: " Pupils are equal, round, and reactive to light.   Cardiovascular:      Rate and Rhythm: Normal rate.   Pulmonary:      Effort: Pulmonary effort is normal.   Musculoskeletal:      Left wrist: Normal.      Left hand: Normal.      Comments: Positive Tinel's sign left wrist  Negative phalen  Negative spurling   Skin:     General: Skin is warm and dry.   Neurological:      General: No focal deficit present.      Mental Status: She is alert and oriented to person, place, and time.   Psychiatric:         Mood and Affect: Mood normal.         Behavior: Behavior normal.         Thought Content: Thought content normal.         Judgment: Judgment normal.                    Signed Electronically by: SALAS Daly CNP

## 2025-05-15 ENCOUNTER — MEDICAL CORRESPONDENCE (OUTPATIENT)
Dept: HEALTH INFORMATION MANAGEMENT | Facility: CLINIC | Age: 40
End: 2025-05-15
Payer: COMMERCIAL

## 2025-07-14 ENCOUNTER — OFFICE VISIT (OUTPATIENT)
Dept: URGENT CARE | Facility: URGENT CARE | Age: 40
End: 2025-07-14
Payer: COMMERCIAL

## 2025-07-14 ENCOUNTER — NURSE TRIAGE (OUTPATIENT)
Dept: FAMILY MEDICINE | Facility: CLINIC | Age: 40
End: 2025-07-14

## 2025-07-14 VITALS
HEIGHT: 66 IN | BODY MASS INDEX: 25.71 KG/M2 | SYSTOLIC BLOOD PRESSURE: 160 MMHG | OXYGEN SATURATION: 99 % | WEIGHT: 160 LBS | RESPIRATION RATE: 99 BRPM | DIASTOLIC BLOOD PRESSURE: 115 MMHG | HEART RATE: 70 BPM | TEMPERATURE: 97.8 F

## 2025-07-14 DIAGNOSIS — R03.0 ELEVATED BLOOD-PRESSURE READING WITHOUT DIAGNOSIS OF HYPERTENSION: Primary | ICD-10-CM

## 2025-07-14 LAB
ALBUMIN SERPL-MCNC: 3.9 G/DL (ref 3.4–5)
ALP SERPL-CCNC: 51 U/L (ref 40–150)
ALT SERPL W P-5'-P-CCNC: 14 U/L (ref 0–50)
ANION GAP SERPL CALCULATED.3IONS-SCNC: 7 MMOL/L (ref 3–14)
AST SERPL W P-5'-P-CCNC: 22 U/L (ref 0–45)
BASOPHILS # BLD AUTO: 0 10E3/UL (ref 0–0.2)
BASOPHILS NFR BLD AUTO: 1 %
BILIRUB SERPL-MCNC: 0.7 MG/DL (ref 0.2–1.3)
BUN SERPL-MCNC: 9 MG/DL (ref 7–30)
CALCIUM SERPL-MCNC: 9.5 MG/DL (ref 8.5–10.1)
CHLORIDE BLD-SCNC: 111 MMOL/L (ref 94–109)
CO2 SERPL-SCNC: 26 MMOL/L (ref 20–32)
CREAT SERPL-MCNC: 0.8 MG/DL (ref 0.52–1.04)
EGFRCR SERPLBLD CKD-EPI 2021: >90 ML/MIN/1.73M2
EOSINOPHIL # BLD AUTO: 0.1 10E3/UL (ref 0–0.7)
EOSINOPHIL NFR BLD AUTO: 1 %
ERYTHROCYTE [DISTWIDTH] IN BLOOD BY AUTOMATED COUNT: 12.4 % (ref 10–15)
GLUCOSE BLD-MCNC: 99 MG/DL (ref 70–99)
HCT VFR BLD AUTO: 40.7 % (ref 35–47)
HGB BLD-MCNC: 13.6 G/DL (ref 11.7–15.7)
IMM GRANULOCYTES # BLD: 0 10E3/UL
IMM GRANULOCYTES NFR BLD: 0 %
LYMPHOCYTES # BLD AUTO: 1.4 10E3/UL (ref 0.8–5.3)
LYMPHOCYTES NFR BLD AUTO: 24 %
MCH RBC QN AUTO: 31.3 PG (ref 26.5–33)
MCHC RBC AUTO-ENTMCNC: 33.4 G/DL (ref 31.5–36.5)
MCV RBC AUTO: 94 FL (ref 78–100)
MONOCYTES # BLD AUTO: 0.3 10E3/UL (ref 0–1.3)
MONOCYTES NFR BLD AUTO: 6 %
NEUTROPHILS # BLD AUTO: 3.9 10E3/UL (ref 1.6–8.3)
NEUTROPHILS NFR BLD AUTO: 68 %
PLATELET # BLD AUTO: 299 10E3/UL (ref 150–450)
POTASSIUM BLD-SCNC: 4.2 MMOL/L (ref 3.4–5.3)
PROT SERPL-MCNC: 7.1 G/DL (ref 6.8–8.8)
RBC # BLD AUTO: 4.35 10E6/UL (ref 3.8–5.2)
SODIUM SERPL-SCNC: 144 MMOL/L (ref 135–145)
WBC # BLD AUTO: 5.8 10E3/UL (ref 4–11)

## 2025-07-14 PROCEDURE — 3077F SYST BP >= 140 MM HG: CPT | Performed by: NURSE PRACTITIONER

## 2025-07-14 PROCEDURE — 36415 COLL VENOUS BLD VENIPUNCTURE: CPT | Performed by: NURSE PRACTITIONER

## 2025-07-14 PROCEDURE — 93000 ELECTROCARDIOGRAM COMPLETE: CPT | Performed by: NURSE PRACTITIONER

## 2025-07-14 PROCEDURE — 99214 OFFICE O/P EST MOD 30 MIN: CPT | Performed by: NURSE PRACTITIONER

## 2025-07-14 PROCEDURE — 85025 COMPLETE CBC W/AUTO DIFF WBC: CPT | Performed by: NURSE PRACTITIONER

## 2025-07-14 PROCEDURE — 80053 COMPREHEN METABOLIC PANEL: CPT | Performed by: NURSE PRACTITIONER

## 2025-07-14 PROCEDURE — 3080F DIAST BP >= 90 MM HG: CPT | Performed by: NURSE PRACTITIONER

## 2025-07-14 NOTE — TELEPHONE ENCOUNTER
Left message on voicemail advising patient to return call at [phone].    When patient returns call please relay:  Patient should go back in for evaluation if having symptoms. SALAS Villa, FNP-BC     Siomara CRANDALL, RN  [facility], [place]

## 2025-07-14 NOTE — TELEPHONE ENCOUNTER
RN unable to find UC visit, will check again to confirm if pt was seen as they may need to be today per protocol.     Diana Eisenberg, RN on 7/14/2025 at 11:25 AM

## 2025-07-14 NOTE — TELEPHONE ENCOUNTER
"Nurse Triage SBAR    Is this a 2nd Level Triage? YES, LICENSED PRACTITIONER REVIEW IS REQUIRED    Situation:   Patient was driving to work when they started experiencing numbness and tingling in their hands and lightheadedness. Patient pulled over, waited for symptoms to improve. 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 Patient is currently in their car waiting for a family member to pick them up.     Background:   Rx: Labetalol 200 mg tablets. Take 1 tablet by mouth 2 times daily. Patient possibly missed Saturday, 07/12/2025 PM dose.     Symptoms occurred approximately one month ago. They lasted for 15 minutes and went away. Current episode is different as patient continues to have symptoms.     05/12/2025: Office visit. Dx: Carpal tunnel syndrome of left wrist.    Patient is 7 months postpartum.    Assessment:   BP prior to call: At approximately 1000: 151/110, 141/113- electronic wrist cuff taken on each wrist.  1020: 172/127- patient was talking  1025:162/112 HR: 68  1035: 148/104    Patient continued to have numbness and tingling to bilateral hands. Neutral position. Ebbing and flowing in severity.  Head continues to feel \"foggy/ fuzzy.\"    Denies chest pain, change in vision, difficulty breathing, SOB    Protocol Recommended Disposition:   See Today In Office    Recommendation:   Protocol recommending office visit today. No in-person appointments available at primary care clinic in [place] or surrounding clinics. Is clinic able to get patient in for in-person office visit today?     Cori Seals RN    Routed to provider    Reason for Disposition   Systolic BP >= 180 OR Diastolic >= 110    Additional Information   Negative: Sounds like a life-threatening emergency to the triager   Negative: Pregnant > 20 weeks or postpartum (< 6 weeks after delivery) and new hand or face swelling   Negative: Pregnant > 20 weeks and BP > 140/90   Negative: Systolic BP >= " 160 OR Diastolic >= 100, and any cardiac or neurologic symptoms (e.g., chest pain, difficulty breathing, unsteady gait, blurred vision)   Negative: Patient sounds very sick or weak to the triager   Negative: BP Systolic BP >= 140 OR Diastolic >= 90 and postpartum (from 0 to 6 weeks after delivery)   Negative: Systolic BP >= 180 OR Diastolic >= 110, and missed most recent dose of blood pressure medication    Protocols used: High Blood Pressure-A-OH

## 2025-07-14 NOTE — PROGRESS NOTES
Urgent Care Clinic Visit    Chief Complaint   Patient presents with    Urgent Care    Hypertension     Pt reports elevated BP this morning 172/127  - BP was taken while sitting in her car at 9:58 AM  Some SOB - unsure if its related to her anxiety, medications or electrolytes   Also complains of dizziness, HA,  light headedness, fuzziness feeling like she was going to faint at times, and  tingling both hands for few minutes off/on    She is accompanied by her mother in law because she didn't feel well enough to drive.   Denies chest pain               7/14/2025    12:20 PM   Additional Questions   Roomed by AF   Accompanied by Afshan - Mother-in law       Assessment & Plan     Elevated blood-pressure reading without diagnosis of hypertension  - EKG 12-lead complete w/read - Clinics  - CBC with platelets and differential  - Comprehensive metabolic panel (BMP + Alb, Alk Phos, ALT, AST, Total. Bili, TP)  - CBC with platelets and differential  - Comprehensive metabolic panel (BMP + Alb, Alk Phos, ALT, AST, Total. Bili, TP)     Patient Instructions     Results for orders placed or performed in visit on 07/14/25   Comprehensive metabolic panel (BMP + Alb, Alk Phos, ALT, AST, Total. Bili, TP)     Status: Abnormal   Result Value Ref Range    Sodium 144 135 - 145 mmol/L    Potassium 4.2 3.4 - 5.3 mmol/L    Chloride 111 (H) 94 - 109 mmol/L    Carbon Dioxide (CO2) 26 20 - 32 mmol/L    Anion Gap 7 3 - 14 mmol/L    Urea Nitrogen 9 7 - 30 mg/dL    Creatinine 0.80 0.52 - 1.04 mg/dL    GFR Estimate >90 >60 mL/min/1.73m2    Calcium 9.5 8.5 - 10.1 mg/dL    Glucose 99 70 - 99 mg/dL    Alkaline Phosphatase 51 40 - 150 U/L    AST 22 0 - 45 U/L    ALT 14 0 - 50 U/L    Protein Total 7.1 6.8 - 8.8 g/dL    Albumin 3.9 3.4 - 5.0 g/dL    Bilirubin Total 0.7 0.2 - 1.3 mg/dL   CBC with platelets and differential     Status: None   Result Value Ref Range    WBC Count 5.8 4.0 - 11.0 10e3/uL    RBC Count 4.35 3.80 - 5.20 10e6/uL    Hemoglobin 13.6  11.7 - 15.7 g/dL    Hematocrit 40.7 35.0 - 47.0 %    MCV 94 78 - 100 fL    MCH 31.3 26.5 - 33.0 pg    MCHC 33.4 31.5 - 36.5 g/dL    RDW 12.4 10.0 - 15.0 %    Platelet Count 299 150 - 450 10e3/uL    % Neutrophils 68 %    % Lymphocytes 24 %    % Monocytes 6 %    % Eosinophils 1 %    % Basophils 1 %    % Immature Granulocytes 0 %    Absolute Neutrophils 3.9 1.6 - 8.3 10e3/uL    Absolute Lymphocytes 1.4 0.8 - 5.3 10e3/uL    Absolute Monocytes 0.3 0.0 - 1.3 10e3/uL    Absolute Eosinophils 0.1 0.0 - 0.7 10e3/uL    Absolute Basophils 0.0 0.0 - 0.2 10e3/uL    Absolute Immature Granulocytes 0.0 <=0.4 10e3/uL   CBC with platelets and differential     Status: None    Narrative    The following orders were created for panel order CBC with platelets and differential.  Procedure                               Abnormality         Status                     ---------                               -----------         ------                     CBC with platelets and ...[[id number]]                      Final result                 Please view results for these tests on the individual orders.     Labs are unremarkable.    BP trending down  ECHO NSR and reassuring.    To ER if worsening again.          No follow-ups on file.    SALAS Sommer [facility] E[facility] is a 40 year old female who presents to clinic today for the following health issues:  Chief Complaint   Patient presents with    Urgent Care    Hypertension     Pt reports elevated BP this morning 172/127  - BP was taken while sitting in her car at 9:58 AM  Some SOB - unsure if its related to her anxiety, medications or electrolytes   Also complains of dizziness, HA,  light headedness, fuzziness feeling like she was going to faint at times, and  tingling both hands for few minutes off/on    She is accompanied by her mother in law because she didn't feel well enough to drive.   Denies chest pain        HPI    Cardiac  "concerns    Symptom Onset: 9 hour(s) ago.  Timing of illness: sudden onset and improving.  Current and Associated symptoms: chest pressure/discomfort, palpitations, fatigue, and lightheadedness.  .  Character: denies chest pain  Severity n/a.  Location: left arm tingling.  Radiation: left arm.  Associated Symptoms: lightheadedness, feeling faint, and stress/anxiety.  Exacerbated by: exertion and movement.  Relieved by: rest.  Cardiac risk factors: none.    Review of Systems  Constitutional, HEENT, cardiovascular, pulmonary, GI, , musculoskeletal, neuro, skin, endocrine and psych systems are negative, except as otherwise noted.      Objective    BP (!) 160/115   Pulse 70   Temp 97.8  F (36.6  C) (Tympanic)   Resp (!) 99   Ht 1.676 m (5' 6\")   Wt 72.6 kg (160 lb)   LMP 07/07/2025 (Exact Date)   SpO2 99%   Breastfeeding No   BMI 25.82 kg/m    Physical Exam   GENERAL: alert and no distress  EYES: Eyes grossly normal to inspection, PERRL and conjunctivae and sclerae normal  HENT: ear canals and TM's normal, nose and mouth without ulcers or lesions  NECK: no adenopathy, no asymmetry, masses, or scars  RESP: lungs clear to auscultation - no rales, rhonchi or wheezes  CV: regular rate and rhythm, normal S1 S2, no S3 or S4, no murmur, click or rub, no peripheral edema  MS: no gross musculoskeletal defects noted, no edema  SKIN: no suspicious lesions or rashes                  "

## 2025-07-15 ENCOUNTER — VIRTUAL VISIT (OUTPATIENT)
Dept: FAMILY MEDICINE | Facility: CLINIC | Age: 40
End: 2025-07-15
Payer: COMMERCIAL

## 2025-07-15 DIAGNOSIS — I10 BENIGN ESSENTIAL HYPERTENSION: Primary | ICD-10-CM

## 2025-07-15 DIAGNOSIS — R41.89 BRAIN FOG: ICD-10-CM

## 2025-07-15 PROCEDURE — 98006 SYNCH AUDIO-VIDEO EST MOD 30: CPT | Performed by: PHYSICIAN ASSISTANT

## 2025-07-15 RX ORDER — LOSARTAN POTASSIUM 25 MG/1
25 TABLET ORAL DAILY
Qty: 30 TABLET | Refills: 0 | Status: SHIPPED | OUTPATIENT
Start: 2025-07-15

## 2025-07-15 NOTE — PROGRESS NOTES
Arpita is a 40 year old who is being evaluated via a billable video visit.    How would you like to obtain your AVS? MyChart  If the video visit is dropped, the invitation should be resent by: Text to cell phone: [phone]  Will anyone else be joining your video visit? No      Assessment & Plan     Benign essential hypertension  Not to goal  Suspect causing symptoms or contributing  Add losartan, increase as needed f/u 1 week  Continue BB for now but may wean down or off that over time  No red flag symptoms and had labs/ekg in urgent care  Follow up if symptoms worsen or change. To ER with severe worsening symptoms.     - losartan (COZAAR) 25 MG tablet; Take 1 tablet (25 mg) by mouth daily. For blood pressure    Brain fog  Same as above  Consider neurology referral in future patient inquiring about if brain imaging needed, discussed not indicated at this time      MED REC REQUIRED  Post Medication Reconciliation Status:         The longitudinal plan of care for the diagnosis(es)/condition(s) as documented were addressed during this visit. Due to the added complexity in care, I will continue to support Arpita in the subsequent management and with ongoing continuity of care.    Subjective   Arpita is a 40 year old, presenting for the following health issues:  Hospital F/U        7/15/2025    11:46 AM   Additional Questions   Roomed by Asia BUCKLEY CMA   Accompanied by n/a         7/15/2025    11:46 AM   Patient Reported Additional Medications   Patient reports taking the following new medications senna tab, prenatal.     [facility]      ED/ Followup:    Facility:  [facility]  Date of visit: 7/14/25  Reason for visit: Hypertension  Current Status: Yesterday-- Started driving and got foggy/faint, checked bp and was high. Happened again and called nurse line. Went to . They did an EKG (normal), labs (CBC, BNP, normal), BP remained high but was feeling better. Discharged w/ no medications and to follow  "up with pcp. Checked last night 148/90. This morning checked at dentist 120/87. Today another foggy/faint feeling happened and took again, 160/1-something.  Feels like she is very anxious about all of this. Hasent been getting good sleep for the last 6 months.     Normal ekg in uc, normal cmp cbc just done yesterday.  notes are not completed to read.   Pregnant? Not currently.     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     Taking labetalol still? Yes still taking this. Bp not really going under 140/90s the past few days.     No palpitations. Feels foggy/blank randomly. Doesn't feel like a panic attack or anxiety causing it but once symptoms starts she feels anxious. No SOB when it happens.     Feels better now but still feels \"foggy headed\". No URI symptoms.     Just started yesterday am. Had this happen before but not this badly.   No fever or rashes. No seizure like activity.   Has headaches off and on. Usually just when bp is high. No vertigo.   No vision changes.             Objective    Vitals - Patient Reported  Systolic (Patient Reported): (!) 148  Diastolic (Patient Reported): (!) 90      Vitals:  No vitals were obtained today due to virtual visit.    Physical Exam   GENERAL: alert and no distress  EYES: Eyes grossly normal to inspection.  No discharge or erythema, or obvious scleral/conjunctival abnormalities.  RESP: No audible wheeze, cough, or visible cyanosis.    SKIN: Visible skin clear. No significant rash, abnormal pigmentation or lesions.  NEURO: Cranial nerves grossly intact.  Mentation and speech appropriate for age.  PSYCH: Appropriate affect, tone, and pace of words          Video-Visit Details    Type of service:  Video Visit   Originating Location (pt. Location): Home    Distant Location (provider location):  On-site  Platform used for Video Visit: Marlene  Signed Electronically by: Yuly Flores PA-C    "

## 2025-07-16 NOTE — TELEPHONE ENCOUNTER
I see patient had virtual visit yesterday regarding this issue.  Closing encounter.    Sarita EAST RN  [facility] Triage

## 2025-07-20 NOTE — PATIENT INSTRUCTIONS
Results for orders placed or performed in visit on 07/14/25   Comprehensive metabolic panel (BMP + Alb, Alk Phos, ALT, AST, Total. Bili, TP)     Status: Abnormal   Result Value Ref Range    Sodium 144 135 - 145 mmol/L    Potassium 4.2 3.4 - 5.3 mmol/L    Chloride 111 (H) 94 - 109 mmol/L    Carbon Dioxide (CO2) 26 20 - 32 mmol/L    Anion Gap 7 3 - 14 mmol/L    Urea Nitrogen 9 7 - 30 mg/dL    Creatinine 0.80 0.52 - 1.04 mg/dL    GFR Estimate >90 >60 mL/min/1.73m2    Calcium 9.5 8.5 - 10.1 mg/dL    Glucose 99 70 - 99 mg/dL    Alkaline Phosphatase 51 40 - 150 U/L    AST 22 0 - 45 U/L    ALT 14 0 - 50 U/L    Protein Total 7.1 6.8 - 8.8 g/dL    Albumin 3.9 3.4 - 5.0 g/dL    Bilirubin Total 0.7 0.2 - 1.3 mg/dL   CBC with platelets and differential     Status: None   Result Value Ref Range    WBC Count 5.8 4.0 - 11.0 10e3/uL    RBC Count 4.35 3.80 - 5.20 10e6/uL    Hemoglobin 13.6 11.7 - 15.7 g/dL    Hematocrit 40.7 35.0 - 47.0 %    MCV 94 78 - 100 fL    MCH 31.3 26.5 - 33.0 pg    MCHC 33.4 31.5 - 36.5 g/dL    RDW 12.4 10.0 - 15.0 %    Platelet Count 299 150 - 450 10e3/uL    % Neutrophils 68 %    % Lymphocytes 24 %    % Monocytes 6 %    % Eosinophils 1 %    % Basophils 1 %    % Immature Granulocytes 0 %    Absolute Neutrophils 3.9 1.6 - 8.3 10e3/uL    Absolute Lymphocytes 1.4 0.8 - 5.3 10e3/uL    Absolute Monocytes 0.3 0.0 - 1.3 10e3/uL    Absolute Eosinophils 0.1 0.0 - 0.7 10e3/uL    Absolute Basophils 0.0 0.0 - 0.2 10e3/uL    Absolute Immature Granulocytes 0.0 <=0.4 10e3/uL   CBC with platelets and differential     Status: None    Narrative    The following orders were created for panel order CBC with platelets and differential.  Procedure                               Abnormality         Status                     ---------                               -----------         ------                     CBC with platelets and ...[[id number]]                      Final result                 Please view results for these  tests on the individual orders.     Labs are unremarkable.    BP trending down  ECHO NSR and reassuring.    To ER if worsening again.

## 2025-07-21 ENCOUNTER — OFFICE VISIT (OUTPATIENT)
Dept: FAMILY MEDICINE | Facility: CLINIC | Age: 40
End: 2025-07-21
Payer: COMMERCIAL

## 2025-07-21 VITALS
OXYGEN SATURATION: 100 % | HEART RATE: 80 BPM | RESPIRATION RATE: 18 BRPM | TEMPERATURE: 97 F | DIASTOLIC BLOOD PRESSURE: 70 MMHG | WEIGHT: 158 LBS | SYSTOLIC BLOOD PRESSURE: 120 MMHG | BODY MASS INDEX: 23.95 KG/M2 | HEIGHT: 68 IN

## 2025-07-21 DIAGNOSIS — R40.4 SPELL OF ALTERED CONSCIOUSNESS: Primary | ICD-10-CM

## 2025-07-21 PROCEDURE — 1126F AMNT PAIN NOTED NONE PRSNT: CPT | Performed by: PHYSICIAN ASSISTANT

## 2025-07-21 PROCEDURE — 3074F SYST BP LT 130 MM HG: CPT | Performed by: PHYSICIAN ASSISTANT

## 2025-07-21 PROCEDURE — 99214 OFFICE O/P EST MOD 30 MIN: CPT | Performed by: PHYSICIAN ASSISTANT

## 2025-07-21 PROCEDURE — 3078F DIAST BP <80 MM HG: CPT | Performed by: PHYSICIAN ASSISTANT

## 2025-07-21 RX ORDER — HYDROXYZINE PAMOATE 25 MG/1
25 CAPSULE ORAL 3 TIMES DAILY PRN
Qty: 30 CAPSULE | Refills: 0 | Status: SHIPPED | OUTPATIENT
Start: 2025-07-21

## 2025-07-21 ASSESSMENT — PAIN SCALES - GENERAL: PAINLEVEL_OUTOF10: NO PAIN (0)

## 2025-07-21 NOTE — PROGRESS NOTES
Assessment & Plan   Problem List Items Addressed This Visit    None  Visit Diagnoses         Spell of altered consciousness    -  Primary    Relevant Medications    hydrOXYzine chapin (VISTARIL) 25 MG capsule    Other Relevant Orders    Adult Neurology  Referral         Assessment & Plan  Spell of altered consciousness:  - Symptoms from unclear process. ?partial seizures, complicated migraine headaches, or anxiety. MRI and other tests have ruled out major concerns. Referral to neurology for further evaluation is recommended.  - Referral to neurology for further evaluation. Hydroxyzine prescribed as needed for sleep and anxiety. Avoid driving or other dangerous activities if feeling unsafe or using hydroxyzine.   -normal neuro exam today.  Complete history and physical exam as below. Afebrile with normal vital signs.    DDx and Dx discussed with and explained to the pt to their satisfaction.  All questions were answered at this time. Pt expressed understanding of and agreement with this dx, tx, and plan. No further workup warranted and standard medication warnings given. I have given the patient a list of pertinent indications for re-evaluation. Will go to the Emergency Department if symptoms worsen or new concerning symptoms arise. Patient left in no apparent distress.   MED REC REQUIRED  Post Medication Reconciliation Status: discharge medications reconciled, continue medications without change  Follow-up  No follow-ups on file.    Subjective   Arpita is a 40 year old, presenting for the following health issues:  Hypertension        7/21/2025     8:59 AM   Additional Questions   Roomed by Akanksha Jessica CMA   Accompanied by N/A         7/21/2025     8:59 AM   Patient Reported Additional Medications   Patient reports taking the following new medications No new medications     HPI Arpita Boltonlinda, 40-year-old female  - Felt lightheaded and near syncopal on July 14, 2025, while driving  - Ongoing daily  "episodes of feeling foggy, fuzzy, and \"blank,\" sometimes described as floating or lost time, since July 14, 2025. No syncopal episodes, incontinence, oral trauma.  - Symptoms occur at least once daily, sometimes multiple times per day, with increased frequency while driving on the highway  - No loss of vision, no loss of muscle tone, no fainting reported  - Intermittent mild headaches, more frequent recently, not severe enough to require Tylenol  - Stopped drinking caffeine since onset of symptoms  - No new medications except started losartan on July 15, 2025, after symptoms began  - No significant nasal congestion or ear symptoms reported  - Visited [facility] on July 18, 2025, for evaluation of these symptoms  - CBC, BMP, EKG, and MRI/MRA of head/neck performed and normal except for benign bilateral maxillary sinus retention cysts and mastoid air cell effusions  - Reports increased stress, possibly related to current health concerns and caring for two young children (ages 3 years and 7 months)    Patient is coming in today due to needing a follow up on blood pressure.  She has been experiencing headaches, and 1-2 times a day has been, \"blanking/blacking out\".  She was seen at the ER and UC and had an EKG done at both, with an MRI occurring at the ER.  (All is within chart).  She has not taken her BP this weekend.      Review of Systems  Constitutional, HEENT, cardiovascular, neuro, pulmonary, gi and gu systems are negative, except as otherwise noted.      Objective    /70   Pulse 80   Temp 97  F (36.1  C) (Temporal)   Resp 18   Ht 1.72 m (5' 7.72\")   Wt 71.7 kg (158 lb)   LMP 07/07/2025 (Exact Date)   SpO2 100%   BMI 24.23 kg/m    Body mass index is 24.23 kg/m .  Physical Exam  Vitals and nursing note reviewed.   Constitutional:       General: She is not in acute distress.     Appearance: She is not ill-appearing or diaphoretic.   HENT:      Head: Normocephalic and atraumatic. "      Right Ear: Tympanic membrane, ear canal and external ear normal.      Left Ear: Tympanic membrane, ear canal and external ear normal.      Mouth/Throat:      Mouth: Mucous membranes are moist.      Pharynx: Oropharynx is clear.   Eyes:      Extraocular Movements: Extraocular movements intact.      Conjunctiva/sclera: Conjunctivae normal.      Pupils: Pupils are equal, round, and reactive to light.   Neck:      Vascular: No carotid bruit.   Cardiovascular:      Rate and Rhythm: Normal rate and regular rhythm.      Heart sounds: Normal heart sounds. No murmur heard.     No friction rub. No gallop.   Pulmonary:      Effort: Pulmonary effort is normal. No respiratory distress.      Breath sounds: Normal breath sounds. No stridor. No wheezing, rhonchi or rales.   Musculoskeletal:      Cervical back: Neck supple. No rigidity.   Lymphadenopathy:      Cervical: No cervical adenopathy.   Skin:     General: Skin is warm and dry.   Neurological:      General: No focal deficit present.      Mental Status: She is alert. Mental status is at baseline.      Comments: Negative finger-nose-finger, heel-to-shin and pronator drift.  Face symmetric.  Speech clear. CNs tested intact. Normal strength and sensation in face and upper/lower extremities bilaterally.   Psychiatric:         Mood and Affect: Mood normal.      Comments: Anxious and tearful at times.                Signed Electronically by: EM Vasquez

## 2025-08-07 ENCOUNTER — MYC MEDICAL ADVICE (OUTPATIENT)
Dept: FAMILY MEDICINE | Facility: CLINIC | Age: 40
End: 2025-08-07
Payer: COMMERCIAL

## 2025-08-07 DIAGNOSIS — I10 BENIGN ESSENTIAL HYPERTENSION: ICD-10-CM

## 2025-08-07 RX ORDER — LOSARTAN POTASSIUM 25 MG/1
25 TABLET ORAL DAILY
Qty: 90 TABLET | Refills: 1 | Status: SHIPPED | OUTPATIENT
Start: 2025-08-07

## 2025-08-18 ENCOUNTER — TRANSFERRED RECORDS (OUTPATIENT)
Dept: HEALTH INFORMATION MANAGEMENT | Facility: CLINIC | Age: 40
End: 2025-08-18
Payer: COMMERCIAL

## (undated) DEVICE — LIFTER SURGICAL ASCENDO SUBMUCOSAL LIFT AGENT BX00712934

## (undated) DEVICE — SUCTION MANIFOLD NEPTUNE 2 SYS 4 PORT 0702-020-000

## (undated) DEVICE — ESU CLEANER TIP 31142717

## (undated) DEVICE — DRSG AQUACEL AG HYDROFIBER  3.5X10" 422605

## (undated) DEVICE — SYR 50ML CATH TIP W/O NDL 309620

## (undated) DEVICE — SUCTION TIP YANKAUER W/O VENT K86

## (undated) DEVICE — IMM KIT SHOULDER TMAX MASK FACE 7210559

## (undated) DEVICE — ESU GROUND PAD ADULT W/CORD E7507

## (undated) DEVICE — DRAPE POUCH INSTRUMENT 1018

## (undated) DEVICE — DRSG AQUACEL AG 3.5X9.75" HYDROFIBER 412011

## (undated) DEVICE — SU MONOCRYL 3-0 PS-1 27" Y936H

## (undated) DEVICE — EYE PREP BETADINE 5% SOLUTION 30ML 0065-0411-30

## (undated) DEVICE — SU MONOCRYL 2-0 SH 27" UND Y417H

## (undated) DEVICE — PAD CHUX UNDERPAD 23X24" 7136

## (undated) DEVICE — SOL WATER IRRIG 1000ML BOTTLE 2F7114

## (undated) DEVICE — SPECIMEN CONTAINER 4OZ

## (undated) DEVICE — PREP DURAPREP 26ML APL 8630

## (undated) DEVICE — BONE CLEANING TIP INTERPULSE  0210-010-000

## (undated) DEVICE — IMM KIT SHOULDER STABILIZATION 7210573

## (undated) DEVICE — PREP CHLORAPREP 26ML TINTED HI-LITE ORANGE 930815

## (undated) DEVICE — DRAPE SHOULDER PACK SPLITS 29365

## (undated) DEVICE — GLOVE BIOGEL PI SZ 7.5 40875

## (undated) DEVICE — STRAP KNEE/BODY 31143004

## (undated) DEVICE — SU VICRYL 2-0 SH 27" J317H

## (undated) DEVICE — STRAP STIRRUP W/SLIP 30187-030

## (undated) DEVICE — PACK SHOULDER CUSTOM ASC SOP15ASUMA

## (undated) DEVICE — SU SILK 2-0 TIE 12X30" A305H

## (undated) DEVICE — PACK MINOR SBA15MIFSE

## (undated) DEVICE — SU ETHIBOND 0 CT-1 CR 8X18" CX21D

## (undated) DEVICE — SUCTION SMOKE EVAC TUBING REDUCER STACKHOUSE

## (undated) DEVICE — DRAPE IOBAN INCISE 23X17" 6650EZ

## (undated) DEVICE — Device

## (undated) DEVICE — PACK SET-UP STD 9102

## (undated) DEVICE — DRAPE LEGGINGS 8421

## (undated) DEVICE — PREP CHLORAPREP 26ML TINTED ORANGE  260815

## (undated) DEVICE — ESU PENCIL SMOKE EVAC W/ROCKER SWITCH 0703-047-000

## (undated) DEVICE — DRAPE STERI U 1015

## (undated) DEVICE — SUCTION IRR SYSTEM W/O TIP INTERPULSE HANDPIECE 0210-100-000

## (undated) DEVICE — GLOVE PROTEXIS W/NEU-THERA 7.5  2D73TE75

## (undated) DEVICE — BLADE SAW SAGITTAL STRK 25X90X1.27MM HD SYS 6 6125-127-090

## (undated) DEVICE — BLADE SAW SAGITTAL STRK 18X90X1.27MM HD SYS 6 6118-127-090

## (undated) DEVICE — LINEN ORTHO PACK 5446

## (undated) DEVICE — LINEN DRAPE 54X72" 5467

## (undated) DEVICE — SYR BULB IRRIG DOVER 60 ML LATEX FREE 67000

## (undated) DEVICE — SOL HYDROGEN PEROXIDE 3% 4OZ BOTTLE F0010

## (undated) DEVICE — SOL NACL 0.9% IRRIG 1000ML BOTTLE 2F7124

## (undated) DEVICE — SOL WATER IRRIG 1000ML BOTTLE 07139-09

## (undated) DEVICE — KIT ENDO FIRST STEP DISINFECTANT 200ML W/POUCH EP-4

## (undated) DEVICE — DRAPE U-POUCH 34X29" 1067

## (undated) DEVICE — ESU PENCIL W/SMOKE EVAC NEPTUNE STRYKER 0703-046-000

## (undated) DEVICE — TUBING SMOKE EVAC 7/8"X6 UNSTERILE LVT-102

## (undated) DEVICE — PREP SKIN SCRUB TRAY 4461A

## (undated) DEVICE — ESU ELEC LEEP LOOP 10X10MM YELLOW DLP-S11

## (undated) DEVICE — SYR 30ML LL W/O NDL 302832

## (undated) DEVICE — TUBING SUCTION 10'X3/16" N510

## (undated) DEVICE — DRSG STERI STRIP 1/2X4" R1547

## (undated) DEVICE — DEVICE RETRIEVER HEWSON 71111579

## (undated) DEVICE — PEN MARKING SKIN W/LABELS 31145918

## (undated) DEVICE — SOL NACL 0.9% INJ 1000ML BAG 2B1324X

## (undated) DEVICE — BRUSH SURGICAL SCRUB W/PCMX 4456A

## (undated) DEVICE — BLADE SAW SAGITTAL STRK 20.7X85X0.89MM 2108-109-000S13

## (undated) DEVICE — LINEN TOWEL PACK X5 5464

## (undated) DEVICE — PAD PERI W/WINGS 1580A

## (undated) DEVICE — RESTRAINT LIMB HOLDER ANKLE/WRIST FOAM W/QUICK RELEASE 2533

## (undated) DEVICE — SYR BULB IRRIG 50ML LATEX FREE 0035280

## (undated) DEVICE — SWAB RAYON 8" 1 TIP LG STERILE 34-7022-50

## (undated) DEVICE — ESU ELEC LEEP BALL 5MM

## (undated) DEVICE — GLOVE PROTEXIS W/NEU-THERA 8.0  2D73TE80

## (undated) DEVICE — GLOVE PROTEXIS POWDER FREE 7.5 ORTHOPEDIC 2D73ET75

## (undated) DEVICE — GLOVE BIOGEL PI MICRO SZ 7.5 48575

## (undated) DEVICE — ESU ELEC NDL 1" E1552

## (undated) DEVICE — PREP BRUSH SURG SCRUB CHLOROXYLENOL PCMX 3% 371163

## (undated) DEVICE — SPONGE COTTONOID 1/2X1" 20-05S

## (undated) DEVICE — SU NDL MCGOWAN 1/2 1859-6D

## (undated) DEVICE — SPONGE LAP 18X18" X8435

## (undated) RX ORDER — FENTANYL CITRATE 50 UG/ML
INJECTION, SOLUTION INTRAMUSCULAR; INTRAVENOUS
Status: DISPENSED
Start: 2021-01-12

## (undated) RX ORDER — EPINEPHRINE NASAL SOLUTION 1 MG/ML
SOLUTION NASAL
Status: DISPENSED
Start: 2021-03-09

## (undated) RX ORDER — CEFAZOLIN SODIUM/WATER 2 G/20 ML
SYRINGE (ML) INTRAVENOUS
Status: DISPENSED
Start: 2023-05-16

## (undated) RX ORDER — HYDROMORPHONE HYDROCHLORIDE 1 MG/ML
INJECTION, SOLUTION INTRAMUSCULAR; INTRAVENOUS; SUBCUTANEOUS
Status: DISPENSED
Start: 2021-03-09

## (undated) RX ORDER — VANCOMYCIN HYDROCHLORIDE 1 G/20ML
INJECTION, POWDER, LYOPHILIZED, FOR SOLUTION INTRAVENOUS
Status: DISPENSED
Start: 2023-05-16

## (undated) RX ORDER — PROPOFOL 10 MG/ML
INJECTION, EMULSION INTRAVENOUS
Status: DISPENSED
Start: 2021-01-12

## (undated) RX ORDER — ACETAMINOPHEN 325 MG/1
TABLET ORAL
Status: DISPENSED
Start: 2021-01-12

## (undated) RX ORDER — LIDOCAINE HYDROCHLORIDE 20 MG/ML
INJECTION, SOLUTION EPIDURAL; INFILTRATION; INTRACAUDAL; PERINEURAL
Status: DISPENSED
Start: 2021-03-09

## (undated) RX ORDER — ACETAMINOPHEN 325 MG/1
TABLET ORAL
Status: DISPENSED
Start: 2019-01-15

## (undated) RX ORDER — GLYCOPYRROLATE 0.2 MG/ML
INJECTION INTRAMUSCULAR; INTRAVENOUS
Status: DISPENSED
Start: 2021-01-12

## (undated) RX ORDER — DEXAMETHASONE SODIUM PHOSPHATE 4 MG/ML
INJECTION, SOLUTION INTRA-ARTICULAR; INTRALESIONAL; INTRAMUSCULAR; INTRAVENOUS; SOFT TISSUE
Status: DISPENSED
Start: 2019-01-15

## (undated) RX ORDER — TRANEXAMIC ACID 650 MG/1
TABLET ORAL
Status: DISPENSED
Start: 2023-05-16

## (undated) RX ORDER — DEXMEDETOMIDINE HYDROCHLORIDE 100 UG/ML
INJECTION, SOLUTION INTRAVENOUS
Status: DISPENSED
Start: 2019-01-15

## (undated) RX ORDER — LIDOCAINE HYDROCHLORIDE AND EPINEPHRINE 10; 10 MG/ML; UG/ML
INJECTION, SOLUTION INFILTRATION; PERINEURAL
Status: DISPENSED
Start: 2019-01-15

## (undated) RX ORDER — DEXAMETHASONE SODIUM PHOSPHATE 4 MG/ML
INJECTION, SOLUTION INTRA-ARTICULAR; INTRALESIONAL; INTRAMUSCULAR; INTRAVENOUS; SOFT TISSUE
Status: DISPENSED
Start: 2023-05-16

## (undated) RX ORDER — BUPIVACAINE HYDROCHLORIDE 2.5 MG/ML
INJECTION, SOLUTION EPIDURAL; INFILTRATION; INTRACAUDAL
Status: DISPENSED
Start: 2021-03-09

## (undated) RX ORDER — ACETIC ACID 3 %
LIQUID (ML) MISCELLANEOUS
Status: DISPENSED
Start: 2019-01-15

## (undated) RX ORDER — ONDANSETRON 2 MG/ML
INJECTION INTRAMUSCULAR; INTRAVENOUS
Status: DISPENSED
Start: 2019-01-15

## (undated) RX ORDER — FENTANYL CITRATE 50 UG/ML
INJECTION, SOLUTION INTRAMUSCULAR; INTRAVENOUS
Status: DISPENSED
Start: 2023-05-16

## (undated) RX ORDER — FENTANYL CITRATE 50 UG/ML
INJECTION, SOLUTION INTRAMUSCULAR; INTRAVENOUS
Status: DISPENSED
Start: 2021-03-09

## (undated) RX ORDER — HYDROMORPHONE HYDROCHLORIDE 1 MG/ML
INJECTION, SOLUTION INTRAMUSCULAR; INTRAVENOUS; SUBCUTANEOUS
Status: DISPENSED
Start: 2023-05-16

## (undated) RX ORDER — KETOROLAC TROMETHAMINE 30 MG/ML
INJECTION, SOLUTION INTRAMUSCULAR; INTRAVENOUS
Status: DISPENSED
Start: 2019-01-15

## (undated) RX ORDER — CEFAZOLIN SODIUM 1 G/3ML
INJECTION, POWDER, FOR SOLUTION INTRAMUSCULAR; INTRAVENOUS
Status: DISPENSED
Start: 2021-01-12

## (undated) RX ORDER — OXYCODONE HYDROCHLORIDE 5 MG/1
TABLET ORAL
Status: DISPENSED
Start: 2023-05-16

## (undated) RX ORDER — TRANEXAMIC ACID 100 MG/ML
INJECTION, SOLUTION INTRAVENOUS
Status: DISPENSED
Start: 2021-01-12

## (undated) RX ORDER — EPINEPHRINE NASAL SOLUTION 1 MG/ML
SOLUTION NASAL
Status: DISPENSED
Start: 2021-01-12

## (undated) RX ORDER — FERRIC SUBSULFATE 0.21 G/G
LIQUID TOPICAL
Status: DISPENSED
Start: 2019-01-15

## (undated) RX ORDER — FENTANYL CITRATE-0.9 % NACL/PF 10 MCG/ML
PLASTIC BAG, INJECTION (ML) INTRAVENOUS
Status: DISPENSED
Start: 2023-05-16

## (undated) RX ORDER — DEXAMETHASONE SODIUM PHOSPHATE 4 MG/ML
INJECTION, SOLUTION INTRA-ARTICULAR; INTRALESIONAL; INTRAMUSCULAR; INTRAVENOUS; SOFT TISSUE
Status: DISPENSED
Start: 2021-03-09

## (undated) RX ORDER — DEXAMETHASONE SODIUM PHOSPHATE 4 MG/ML
INJECTION, SOLUTION INTRA-ARTICULAR; INTRALESIONAL; INTRAMUSCULAR; INTRAVENOUS; SOFT TISSUE
Status: DISPENSED
Start: 2021-01-12

## (undated) RX ORDER — EPHEDRINE SULFATE 50 MG/ML
INJECTION, SOLUTION INTRAMUSCULAR; INTRAVENOUS; SUBCUTANEOUS
Status: DISPENSED
Start: 2023-05-16

## (undated) RX ORDER — PROPOFOL 10 MG/ML
INJECTION, EMULSION INTRAVENOUS
Status: DISPENSED
Start: 2023-05-16

## (undated) RX ORDER — FENTANYL CITRATE 50 UG/ML
INJECTION, SOLUTION INTRAMUSCULAR; INTRAVENOUS
Status: DISPENSED
Start: 2025-05-09

## (undated) RX ORDER — CEFAZOLIN SODIUM 1 G/50ML
SOLUTION INTRAVENOUS
Status: DISPENSED
Start: 2021-03-09

## (undated) RX ORDER — ONDANSETRON 2 MG/ML
INJECTION INTRAMUSCULAR; INTRAVENOUS
Status: DISPENSED
Start: 2021-01-12

## (undated) RX ORDER — SCOLOPAMINE TRANSDERMAL SYSTEM 1 MG/1
PATCH, EXTENDED RELEASE TRANSDERMAL
Status: DISPENSED
Start: 2021-01-12

## (undated) RX ORDER — ONDANSETRON 2 MG/ML
INJECTION INTRAMUSCULAR; INTRAVENOUS
Status: DISPENSED
Start: 2021-03-09

## (undated) RX ORDER — GABAPENTIN 300 MG/1
CAPSULE ORAL
Status: DISPENSED
Start: 2019-01-15

## (undated) RX ORDER — BUPIVACAINE HYDROCHLORIDE 2.5 MG/ML
INJECTION, SOLUTION EPIDURAL; INFILTRATION; INTRACAUDAL
Status: DISPENSED
Start: 2021-01-12

## (undated) RX ORDER — FENTANYL CITRATE 50 UG/ML
INJECTION, SOLUTION INTRAMUSCULAR; INTRAVENOUS
Status: DISPENSED
Start: 2019-01-15

## (undated) RX ORDER — PROPOFOL 10 MG/ML
INJECTION, EMULSION INTRAVENOUS
Status: DISPENSED
Start: 2019-01-15

## (undated) RX ORDER — LIDOCAINE HYDROCHLORIDE 20 MG/ML
INJECTION, SOLUTION EPIDURAL; INFILTRATION; INTRACAUDAL; PERINEURAL
Status: DISPENSED
Start: 2021-01-12

## (undated) RX ORDER — KETOROLAC TROMETHAMINE 30 MG/ML
INJECTION, SOLUTION INTRAMUSCULAR; INTRAVENOUS
Status: DISPENSED
Start: 2021-03-09

## (undated) RX ORDER — GABAPENTIN 300 MG/1
CAPSULE ORAL
Status: DISPENSED
Start: 2021-01-12

## (undated) RX ORDER — PROPOFOL 10 MG/ML
INJECTION, EMULSION INTRAVENOUS
Status: DISPENSED
Start: 2021-03-09

## (undated) RX ORDER — ACETAMINOPHEN 325 MG/1
TABLET ORAL
Status: DISPENSED
Start: 2023-05-16

## (undated) RX ORDER — EPINEPHRINE 1 MG/ML
INJECTION, SOLUTION INTRAMUSCULAR; SUBCUTANEOUS
Status: DISPENSED
Start: 2021-03-09

## (undated) RX ORDER — TRANEXAMIC ACID 100 MG/ML
INJECTION, SOLUTION INTRAVENOUS
Status: DISPENSED
Start: 2021-03-09

## (undated) RX ORDER — VANCOMYCIN HYDROCHLORIDE 1 G/20ML
INJECTION, POWDER, LYOPHILIZED, FOR SOLUTION INTRAVENOUS
Status: DISPENSED
Start: 2021-01-12

## (undated) RX ORDER — ONDANSETRON 2 MG/ML
INJECTION INTRAMUSCULAR; INTRAVENOUS
Status: DISPENSED
Start: 2023-05-16

## (undated) RX ORDER — EPINEPHRINE 1 MG/ML
INJECTION, SOLUTION INTRAMUSCULAR; SUBCUTANEOUS
Status: DISPENSED
Start: 2021-01-12